# Patient Record
Sex: FEMALE | Race: WHITE | Employment: UNEMPLOYED | URBAN - METROPOLITAN AREA
[De-identification: names, ages, dates, MRNs, and addresses within clinical notes are randomized per-mention and may not be internally consistent; named-entity substitution may affect disease eponyms.]

---

## 2017-06-20 DIAGNOSIS — E03.9 ACQUIRED HYPOTHYROIDISM: ICD-10-CM

## 2017-06-20 RX ORDER — LEVOTHYROXINE SODIUM 50 MCG
TABLET ORAL
Qty: 30 TABLET | Refills: 0 | Status: SHIPPED | OUTPATIENT
Start: 2017-06-20 | End: 2017-07-18 | Stop reason: SDUPTHER

## 2017-07-17 ENCOUNTER — OFFICE VISIT (OUTPATIENT)
Dept: FAMILY MEDICINE CLINIC | Age: 47
End: 2017-07-17

## 2017-07-17 VITALS
BODY MASS INDEX: 29.26 KG/M2 | HEIGHT: 62 IN | DIASTOLIC BLOOD PRESSURE: 84 MMHG | OXYGEN SATURATION: 98 % | WEIGHT: 159 LBS | SYSTOLIC BLOOD PRESSURE: 142 MMHG | HEART RATE: 68 BPM

## 2017-07-17 DIAGNOSIS — Z00.00 ROUTINE HEALTH MAINTENANCE: Chronic | ICD-10-CM

## 2017-07-17 DIAGNOSIS — R03.0 ELEVATED BLOOD PRESSURE (NOT HYPERTENSION): ICD-10-CM

## 2017-07-17 DIAGNOSIS — E78.00 ELEVATED CHOLESTEROL: ICD-10-CM

## 2017-07-17 DIAGNOSIS — E03.9 ACQUIRED HYPOTHYROIDISM: Primary | Chronic | ICD-10-CM

## 2017-07-17 LAB
A/G RATIO: 1.6 (ref 1.1–2.2)
ALBUMIN SERPL-MCNC: 4.6 G/DL (ref 3.4–5)
ALP BLD-CCNC: 68 U/L (ref 40–129)
ALT SERPL-CCNC: 25 U/L (ref 10–40)
ANION GAP SERPL CALCULATED.3IONS-SCNC: 18 MMOL/L (ref 3–16)
AST SERPL-CCNC: 18 U/L (ref 15–37)
BASOPHILS ABSOLUTE: 0 K/UL (ref 0–0.2)
BASOPHILS RELATIVE PERCENT: 0.6 %
BILIRUB SERPL-MCNC: 0.5 MG/DL (ref 0–1)
BUN BLDV-MCNC: 12 MG/DL (ref 7–20)
CALCIUM SERPL-MCNC: 9.7 MG/DL (ref 8.3–10.6)
CHLORIDE BLD-SCNC: 101 MMOL/L (ref 99–110)
CHOLESTEROL, TOTAL: 268 MG/DL (ref 0–199)
CO2: 20 MMOL/L (ref 21–32)
CREAT SERPL-MCNC: 0.9 MG/DL (ref 0.6–1.1)
EOSINOPHILS ABSOLUTE: 0.1 K/UL (ref 0–0.6)
EOSINOPHILS RELATIVE PERCENT: 2 %
GFR AFRICAN AMERICAN: >60
GFR NON-AFRICAN AMERICAN: >60
GLOBULIN: 2.8 G/DL
GLUCOSE BLD-MCNC: 107 MG/DL (ref 70–99)
HCT VFR BLD CALC: 40.7 % (ref 36–48)
HDLC SERPL-MCNC: 44 MG/DL (ref 40–60)
HEMOGLOBIN: 13.7 G/DL (ref 12–16)
LDL CHOLESTEROL CALCULATED: 180 MG/DL
LYMPHOCYTES ABSOLUTE: 1.8 K/UL (ref 1–5.1)
LYMPHOCYTES RELATIVE PERCENT: 24 %
MCH RBC QN AUTO: 34 PG (ref 26–34)
MCHC RBC AUTO-ENTMCNC: 33.6 G/DL (ref 31–36)
MCV RBC AUTO: 101.3 FL (ref 80–100)
MONOCYTES ABSOLUTE: 0.4 K/UL (ref 0–1.3)
MONOCYTES RELATIVE PERCENT: 4.8 %
NEUTROPHILS ABSOLUTE: 5 K/UL (ref 1.7–7.7)
NEUTROPHILS RELATIVE PERCENT: 68.6 %
PDW BLD-RTO: 13.6 % (ref 12.4–15.4)
PLATELET # BLD: 246 K/UL (ref 135–450)
PMV BLD AUTO: 9.6 FL (ref 5–10.5)
POTASSIUM SERPL-SCNC: 4.7 MMOL/L (ref 3.5–5.1)
RBC # BLD: 4.02 M/UL (ref 4–5.2)
SODIUM BLD-SCNC: 139 MMOL/L (ref 136–145)
TOTAL PROTEIN: 7.4 G/DL (ref 6.4–8.2)
TRIGL SERPL-MCNC: 219 MG/DL (ref 0–150)
TSH REFLEX: 2.73 UIU/ML (ref 0.27–4.2)
VLDLC SERPL CALC-MCNC: 44 MG/DL
WBC # BLD: 7.3 K/UL (ref 4–11)

## 2017-07-17 PROCEDURE — 99213 OFFICE O/P EST LOW 20 MIN: CPT | Performed by: FAMILY MEDICINE

## 2017-07-17 PROCEDURE — 36415 COLL VENOUS BLD VENIPUNCTURE: CPT | Performed by: FAMILY MEDICINE

## 2017-07-17 ASSESSMENT — PATIENT HEALTH QUESTIONNAIRE - PHQ9
SUM OF ALL RESPONSES TO PHQ QUESTIONS 1-9: 0
2. FEELING DOWN, DEPRESSED OR HOPELESS: 0
1. LITTLE INTEREST OR PLEASURE IN DOING THINGS: 0
SUM OF ALL RESPONSES TO PHQ9 QUESTIONS 1 & 2: 0

## 2017-07-18 ENCOUNTER — TELEPHONE (OUTPATIENT)
Dept: FAMILY MEDICINE CLINIC | Age: 47
End: 2017-07-18

## 2017-07-18 DIAGNOSIS — E03.9 ACQUIRED HYPOTHYROIDISM: ICD-10-CM

## 2017-07-18 RX ORDER — LEVOTHYROXINE SODIUM 50 MCG
TABLET ORAL
Qty: 30 TABLET | Refills: 0 | Status: SHIPPED | OUTPATIENT
Start: 2017-07-18 | End: 2017-07-24

## 2017-07-24 RX ORDER — LEVOTHYROXINE AND LIOTHYRONINE 19; 4.5 UG/1; UG/1
30 TABLET ORAL DAILY
Qty: 30 TABLET | Refills: 5 | Status: SHIPPED | OUTPATIENT
Start: 2017-07-24 | End: 2018-01-21 | Stop reason: SDUPTHER

## 2017-07-28 ENCOUNTER — TELEPHONE (OUTPATIENT)
Dept: FAMILY MEDICINE CLINIC | Age: 47
End: 2017-07-28

## 2017-07-28 NOTE — TELEPHONE ENCOUNTER
Left message for patient to call back. There is no way for us to know what her blood type is, unfortunately it is not listed in their charts and insurance typically only covers for a type & screening if they are having a surgery where blood may be needed. The usual way is when we donate blood, [facility] and other centers keep this info on file and if she has ever donated she can usually contact them for this info.

## 2018-01-21 DIAGNOSIS — E03.9 ACQUIRED HYPOTHYROIDISM: ICD-10-CM

## 2018-01-21 RX ORDER — LEVOTHYROXINE, LIOTHYRONINE 19; 4.5 UG/1; UG/1
TABLET ORAL
Qty: 30 TABLET | Refills: 4 | Status: SHIPPED | OUTPATIENT
Start: 2018-01-21 | End: 2018-06-22 | Stop reason: SDUPTHER

## 2018-05-03 ENCOUNTER — HOSPITAL ENCOUNTER (OUTPATIENT)
Dept: MAMMOGRAPHY | Age: 48
Discharge: HOME | End: 2018-05-03
Attending: OBSTETRICS & GYNECOLOGY | Admitting: OBSTETRICS & GYNECOLOGY

## 2018-05-03 DIAGNOSIS — Z12.31 ENCOUNTER FOR SCREENING MAMMOGRAM FOR BREAST CANCER: ICD-10-CM

## 2018-06-22 ENCOUNTER — TELEPHONE (OUTPATIENT)
Dept: FAMILY MEDICINE CLINIC | Age: 48
End: 2018-06-22

## 2018-06-22 DIAGNOSIS — E03.9 ACQUIRED HYPOTHYROIDISM: ICD-10-CM

## 2018-06-22 RX ORDER — LEVOTHYROXINE AND LIOTHYRONINE 19; 4.5 UG/1; UG/1
TABLET ORAL
Qty: 30 TABLET | Refills: 0 | Status: SHIPPED | OUTPATIENT
Start: 2018-06-22 | End: 2018-07-18 | Stop reason: SDUPTHER

## 2018-07-03 ENCOUNTER — OFFICE VISIT (OUTPATIENT)
Dept: FAMILY MEDICINE CLINIC | Age: 48
End: 2018-07-03

## 2018-07-03 VITALS
HEART RATE: 88 BPM | BODY MASS INDEX: 29.3 KG/M2 | WEIGHT: 159.2 LBS | OXYGEN SATURATION: 98 % | DIASTOLIC BLOOD PRESSURE: 78 MMHG | SYSTOLIC BLOOD PRESSURE: 136 MMHG | HEIGHT: 62 IN

## 2018-07-03 DIAGNOSIS — Z11.4 SCREENING FOR HIV WITHOUT PRESENCE OF RISK FACTORS: ICD-10-CM

## 2018-07-03 DIAGNOSIS — E78.2 MIXED HYPERLIPIDEMIA: ICD-10-CM

## 2018-07-03 DIAGNOSIS — Z00.00 ANNUAL PHYSICAL EXAM: Primary | ICD-10-CM

## 2018-07-03 DIAGNOSIS — R73.9 HYPERGLYCEMIA: ICD-10-CM

## 2018-07-03 DIAGNOSIS — E07.89 THYROID FULLNESS: ICD-10-CM

## 2018-07-03 DIAGNOSIS — E03.9 ACQUIRED HYPOTHYROIDISM: ICD-10-CM

## 2018-07-03 LAB
A/G RATIO: 2.3 (ref 1.1–2.2)
ALBUMIN SERPL-MCNC: 4.9 G/DL (ref 3.4–5)
ALP BLD-CCNC: 66 U/L (ref 40–129)
ALT SERPL-CCNC: 22 U/L (ref 10–40)
ANION GAP SERPL CALCULATED.3IONS-SCNC: 16 MMOL/L (ref 3–16)
AST SERPL-CCNC: 16 U/L (ref 15–37)
BILIRUB SERPL-MCNC: 0.5 MG/DL (ref 0–1)
BUN BLDV-MCNC: 10 MG/DL (ref 7–20)
CALCIUM SERPL-MCNC: 9.8 MG/DL (ref 8.3–10.6)
CHLORIDE BLD-SCNC: 101 MMOL/L (ref 99–110)
CHOLESTEROL, TOTAL: 266 MG/DL (ref 0–199)
CO2: 23 MMOL/L (ref 21–32)
CREAT SERPL-MCNC: 0.9 MG/DL (ref 0.6–1.1)
GFR AFRICAN AMERICAN: >60
GFR NON-AFRICAN AMERICAN: >60
GLOBULIN: 2.1 G/DL
GLUCOSE BLD-MCNC: 113 MG/DL (ref 70–99)
HDLC SERPL-MCNC: 42 MG/DL (ref 40–60)
LDL CHOLESTEROL CALCULATED: 177 MG/DL
POTASSIUM SERPL-SCNC: 4.9 MMOL/L (ref 3.5–5.1)
SODIUM BLD-SCNC: 140 MMOL/L (ref 136–145)
TOTAL PROTEIN: 7 G/DL (ref 6.4–8.2)
TRIGL SERPL-MCNC: 233 MG/DL (ref 0–150)
VLDLC SERPL CALC-MCNC: 47 MG/DL

## 2018-07-03 PROCEDURE — 99396 PREV VISIT EST AGE 40-64: CPT | Performed by: FAMILY MEDICINE

## 2018-07-03 PROCEDURE — 36415 COLL VENOUS BLD VENIPUNCTURE: CPT | Performed by: FAMILY MEDICINE

## 2018-07-03 RX ORDER — LEVOTHYROXINE AND LIOTHYRONINE 19; 4.5 UG/1; UG/1
TABLET ORAL
Qty: 30 TABLET | Refills: 5 | Status: CANCELLED | OUTPATIENT
Start: 2018-07-03

## 2018-07-03 ASSESSMENT — ENCOUNTER SYMPTOMS
COLOR CHANGE: 0
SINUS PRESSURE: 0
EYE PAIN: 0
DIARRHEA: 0
NAUSEA: 0
CONSTIPATION: 0
VOMITING: 0
RHINORRHEA: 0
CHEST TIGHTNESS: 0
BLOOD IN STOOL: 0
ABDOMINAL PAIN: 0
EYE DISCHARGE: 0
WHEEZING: 0
SHORTNESS OF BREATH: 0
EYE REDNESS: 0
COUGH: 0

## 2018-07-04 LAB
ESTIMATED AVERAGE GLUCOSE: 119.8 MG/DL
HBA1C MFR BLD: 5.8 %
HIV AG/AB: NORMAL
HIV ANTIGEN: NORMAL
HIV-1 ANTIBODY: NORMAL
HIV-2 AB: NORMAL

## 2018-07-05 NOTE — PROGRESS NOTES
Patient was advised of results and voiced understanding. She would like to try diet and exercise first before she goes on a medication. If she really puts forth the effort with her diet can she hold off until you rechceck her numbers to see if she makes any significant impact on her own before starting a medication? If so when should she plan on coming back to recheck.
Uncle      Social History   Substance Use Topics    Smoking status: Never Smoker    Smokeless tobacco: Never Used    Alcohol use Yes      Comment: justin     Vitals:    07/03/18 0859   BP: 136/78   Pulse: 88   SpO2: 98%   Weight: 159 lb 3.2 oz (72.2 kg)   Height: 5' 2\" (1.575 m)     Body mass index is 29.12 kg/m². Wt Readings from Last 3 Encounters:   07/03/18 159 lb 3.2 oz (72.2 kg)   07/17/17 159 lb (72.1 kg)   07/13/16 154 lb (69.9 kg)     BP Readings from Last 3 Encounters:   07/03/18 136/78   07/17/17 (!) 142/84   06/22/16 138/80        Review of Systems   Constitutional: Negative for chills, fatigue, fever and unexpected weight change. HENT: Negative for ear discharge, ear pain, hearing loss, rhinorrhea, sinus pressure and tinnitus. Eyes: Negative for pain, discharge, redness and visual disturbance. Respiratory: Negative for cough, chest tightness, shortness of breath and wheezing. Cardiovascular: Negative for chest pain and palpitations. Gastrointestinal: Negative for abdominal pain, blood in stool, constipation, diarrhea, nausea and vomiting. Genitourinary: Negative for difficulty urinating, dysuria and hematuria. Musculoskeletal: Negative for arthralgias and joint swelling. Skin: Negative for color change and rash. Neurological: Negative for dizziness, seizures, syncope and headaches. Hematological: Negative for adenopathy. Does not bruise/bleed easily. Psychiatric/Behavioral: Negative for dysphoric mood and sleep disturbance. The patient is not nervous/anxious. Objective:   Physical Exam   Constitutional: She is oriented to person, place, and time. She appears well-developed and well-nourished. No distress. HENT:   Head: Normocephalic. Right Ear: External ear normal.   Left Ear: External ear normal.   Nose: Nose normal.   Mouth/Throat: Oropharynx is clear and moist. No oropharyngeal exudate.    Eyes: Conjunctivae and EOM are normal. Pupils are equal, round, and

## 2018-07-18 DIAGNOSIS — E03.9 ACQUIRED HYPOTHYROIDISM: ICD-10-CM

## 2018-07-18 DIAGNOSIS — E03.9 ACQUIRED HYPOTHYROIDISM: Primary | Chronic | ICD-10-CM

## 2018-07-18 NOTE — TELEPHONE ENCOUNTER
Omar Wright is requesting refill(s) NP thyroid  Last OV 7/3/18 (pertaining to medication)  LR 6/22/18 (per medication requested)  Next office visit scheduled or attempted Yes   If no, reason:  Pt is scheduled for 1/8/19

## 2018-07-23 ENCOUNTER — TELEPHONE (OUTPATIENT)
Dept: FAMILY MEDICINE CLINIC | Age: 48
End: 2018-07-23

## 2018-07-23 NOTE — TELEPHONE ENCOUNTER
Patient was scheduled to have drawn on Friday 7/20 but did not show up, called and rescheduled her for tomorrow 7/24 to have this drawn.

## 2018-07-24 ENCOUNTER — NURSE ONLY (OUTPATIENT)
Dept: FAMILY MEDICINE CLINIC | Age: 48
End: 2018-07-24

## 2018-07-24 DIAGNOSIS — E03.9 ACQUIRED HYPOTHYROIDISM: Chronic | ICD-10-CM

## 2018-07-24 PROCEDURE — 36415 COLL VENOUS BLD VENIPUNCTURE: CPT | Performed by: FAMILY MEDICINE

## 2018-07-25 LAB — TSH REFLEX: 3.27 UIU/ML (ref 0.27–4.2)

## 2018-07-25 RX ORDER — LEVOTHYROXINE, LIOTHYRONINE 19; 4.5 UG/1; UG/1
TABLET ORAL
Qty: 30 TABLET | Refills: 5 | Status: SHIPPED | OUTPATIENT
Start: 2018-07-25 | End: 2019-01-14 | Stop reason: ALTCHOICE

## 2018-07-26 ENCOUNTER — HOSPITAL ENCOUNTER (OUTPATIENT)
Dept: ULTRASOUND IMAGING | Age: 48
Discharge: HOME | End: 2018-07-26
Payer: COMMERCIAL

## 2018-07-26 DIAGNOSIS — E07.89 THYROID FULLNESS: ICD-10-CM

## 2018-07-26 DIAGNOSIS — E03.9 ACQUIRED HYPOTHYROIDISM: ICD-10-CM

## 2018-07-26 PROCEDURE — 76536 US EXAM OF HEAD AND NECK: CPT

## 2018-08-02 PROBLEM — Z00.00 ANNUAL PHYSICAL EXAM: Status: RESOLVED | Noted: 2018-07-03 | Resolved: 2018-08-02

## 2019-01-08 ENCOUNTER — OFFICE VISIT (OUTPATIENT)
Dept: FAMILY MEDICINE CLINIC | Age: 49
End: 2019-01-08
Payer: COMMERCIAL

## 2019-01-08 VITALS
WEIGHT: 159.6 LBS | DIASTOLIC BLOOD PRESSURE: 82 MMHG | HEIGHT: 62 IN | OXYGEN SATURATION: 99 % | SYSTOLIC BLOOD PRESSURE: 132 MMHG | BODY MASS INDEX: 29.37 KG/M2 | HEART RATE: 70 BPM

## 2019-01-08 DIAGNOSIS — Z80.0 FAMILY HX OF COLON CANCER: ICD-10-CM

## 2019-01-08 DIAGNOSIS — E78.2 MIXED HYPERLIPIDEMIA: ICD-10-CM

## 2019-01-08 DIAGNOSIS — E03.9 ACQUIRED HYPOTHYROIDISM: Primary | Chronic | ICD-10-CM

## 2019-01-08 DIAGNOSIS — R73.03 PREDIABETES: ICD-10-CM

## 2019-01-08 LAB
A/G RATIO: 1.8 (ref 1.1–2.2)
ALBUMIN SERPL-MCNC: 4.4 G/DL (ref 3.4–5)
ALP BLD-CCNC: 78 U/L (ref 40–129)
ALT SERPL-CCNC: 30 U/L (ref 10–40)
ANION GAP SERPL CALCULATED.3IONS-SCNC: 13 MMOL/L (ref 3–16)
AST SERPL-CCNC: 17 U/L (ref 15–37)
BILIRUB SERPL-MCNC: 0.4 MG/DL (ref 0–1)
BUN BLDV-MCNC: 13 MG/DL (ref 7–20)
CALCIUM SERPL-MCNC: 9.4 MG/DL (ref 8.3–10.6)
CHLORIDE BLD-SCNC: 104 MMOL/L (ref 99–110)
CHOLESTEROL, TOTAL: 259 MG/DL (ref 0–199)
CO2: 25 MMOL/L (ref 21–32)
CREAT SERPL-MCNC: 0.9 MG/DL (ref 0.6–1.1)
GFR AFRICAN AMERICAN: >60
GFR NON-AFRICAN AMERICAN: >60
GLOBULIN: 2.4 G/DL
GLUCOSE BLD-MCNC: 104 MG/DL (ref 70–99)
HDLC SERPL-MCNC: 47 MG/DL (ref 40–60)
LDL CHOLESTEROL CALCULATED: 168 MG/DL
POTASSIUM SERPL-SCNC: 4.6 MMOL/L (ref 3.5–5.1)
SODIUM BLD-SCNC: 142 MMOL/L (ref 136–145)
T4 FREE: 0.6 NG/DL (ref 0.9–1.8)
TOTAL PROTEIN: 6.8 G/DL (ref 6.4–8.2)
TRIGL SERPL-MCNC: 218 MG/DL (ref 0–150)
TSH REFLEX: 7.08 UIU/ML (ref 0.27–4.2)
VLDLC SERPL CALC-MCNC: 44 MG/DL

## 2019-01-08 PROCEDURE — 99213 OFFICE O/P EST LOW 20 MIN: CPT | Performed by: FAMILY MEDICINE

## 2019-01-08 ASSESSMENT — ENCOUNTER SYMPTOMS
BLOOD IN STOOL: 0
SINUS PRESSURE: 0
RHINORRHEA: 0
CHEST TIGHTNESS: 0
CONSTIPATION: 0
COUGH: 0
WHEEZING: 0
VOMITING: 0
EYE REDNESS: 0
ABDOMINAL PAIN: 0
SHORTNESS OF BREATH: 0
EYE DISCHARGE: 0
DIARRHEA: 0
EYE PAIN: 0

## 2019-01-08 ASSESSMENT — PATIENT HEALTH QUESTIONNAIRE - PHQ9
SUM OF ALL RESPONSES TO PHQ QUESTIONS 1-9: 0
1. LITTLE INTEREST OR PLEASURE IN DOING THINGS: 0
2. FEELING DOWN, DEPRESSED OR HOPELESS: 0
SUM OF ALL RESPONSES TO PHQ QUESTIONS 1-9: 0
SUM OF ALL RESPONSES TO PHQ9 QUESTIONS 1 & 2: 0

## 2019-01-09 LAB
ESTIMATED AVERAGE GLUCOSE: 119.8 MG/DL
HBA1C MFR BLD: 5.8 %

## 2019-01-14 DIAGNOSIS — E03.9 ACQUIRED HYPOTHYROIDISM: Primary | Chronic | ICD-10-CM

## 2019-01-14 RX ORDER — LEVOTHYROXINE SODIUM 0.05 MG/1
50 TABLET ORAL DAILY
Qty: 90 TABLET | Refills: 0 | Status: SHIPPED | OUTPATIENT
Start: 2019-01-14 | End: 2019-04-12 | Stop reason: SDUPTHER

## 2019-03-12 ENCOUNTER — NURSE ONLY (OUTPATIENT)
Dept: FAMILY MEDICINE CLINIC | Age: 49
End: 2019-03-12
Payer: COMMERCIAL

## 2019-03-12 DIAGNOSIS — E03.9 ACQUIRED HYPOTHYROIDISM: Chronic | ICD-10-CM

## 2019-03-12 LAB — TSH REFLEX: 2.47 UIU/ML (ref 0.27–4.2)

## 2019-03-12 PROCEDURE — 36415 COLL VENOUS BLD VENIPUNCTURE: CPT | Performed by: FAMILY MEDICINE

## 2019-04-12 RX ORDER — LEVOTHYROXINE SODIUM 0.05 MG/1
TABLET ORAL
Qty: 30 TABLET | Refills: 0 | Status: SHIPPED | OUTPATIENT
Start: 2019-04-12 | End: 2019-05-11 | Stop reason: SDUPTHER

## 2019-05-13 RX ORDER — LEVOTHYROXINE SODIUM 50 MCG
TABLET ORAL
Qty: 30 TABLET | Refills: 0 | Status: SHIPPED | OUTPATIENT
Start: 2019-05-13 | End: 2019-06-12 | Stop reason: SDUPTHER

## 2019-05-13 NOTE — TELEPHONE ENCOUNTER
Harvey Lassiter [phone] (home)    is requesting refill(s) of medication synthroid to preferred [facility]     Last OV 1/8/19 (pertaining to medication)   Last refill 4/12/19 (per medication requested)  Next office visit scheduled or attempted Yes  Date 7/9/19  If No, reason

## 2019-06-12 RX ORDER — LEVOTHYROXINE SODIUM 50 MCG
TABLET ORAL
Qty: 30 TABLET | Refills: 0 | Status: SHIPPED | OUTPATIENT
Start: 2019-06-12 | End: 2019-07-08 | Stop reason: SDUPTHER

## 2019-07-08 RX ORDER — LEVOTHYROXINE SODIUM 50 MCG
TABLET ORAL
Qty: 30 TABLET | Refills: 0 | Status: SHIPPED | OUTPATIENT
Start: 2019-07-08 | End: 2019-07-24 | Stop reason: ALTCHOICE

## 2019-07-09 ENCOUNTER — OFFICE VISIT (OUTPATIENT)
Dept: FAMILY MEDICINE CLINIC | Age: 49
End: 2019-07-09
Payer: COMMERCIAL

## 2019-07-09 VITALS
OXYGEN SATURATION: 100 % | WEIGHT: 159.8 LBS | BODY MASS INDEX: 29.4 KG/M2 | HEART RATE: 64 BPM | SYSTOLIC BLOOD PRESSURE: 140 MMHG | HEIGHT: 62 IN | DIASTOLIC BLOOD PRESSURE: 80 MMHG

## 2019-07-09 DIAGNOSIS — E03.9 ACQUIRED HYPOTHYROIDISM: Primary | Chronic | ICD-10-CM

## 2019-07-09 DIAGNOSIS — R73.03 PREDIABETES: ICD-10-CM

## 2019-07-09 DIAGNOSIS — Z80.0 FAMILY HX OF COLON CANCER: ICD-10-CM

## 2019-07-09 DIAGNOSIS — Z00.00 ANNUAL PHYSICAL EXAM: ICD-10-CM

## 2019-07-09 DIAGNOSIS — E78.2 MIXED HYPERLIPIDEMIA: ICD-10-CM

## 2019-07-09 LAB
CHOLESTEROL, TOTAL: 239 MG/DL (ref 0–199)
HDLC SERPL-MCNC: 42 MG/DL (ref 40–60)
LDL CHOLESTEROL CALCULATED: 148 MG/DL
T4 FREE: 0.9 NG/DL (ref 0.9–1.8)
TRIGL SERPL-MCNC: 243 MG/DL (ref 0–150)
TSH REFLEX: 5.82 UIU/ML (ref 0.27–4.2)
VLDLC SERPL CALC-MCNC: 49 MG/DL

## 2019-07-09 PROCEDURE — 36415 COLL VENOUS BLD VENIPUNCTURE: CPT | Performed by: FAMILY MEDICINE

## 2019-07-09 PROCEDURE — 99396 PREV VISIT EST AGE 40-64: CPT | Performed by: FAMILY MEDICINE

## 2019-07-09 ASSESSMENT — ENCOUNTER SYMPTOMS
CHEST TIGHTNESS: 0
VOMITING: 0
EYE PAIN: 0
WHEEZING: 0
SHORTNESS OF BREATH: 0
NAUSEA: 0
DIARRHEA: 0
EYE REDNESS: 0
COLOR CHANGE: 0
COUGH: 0
ABDOMINAL PAIN: 0
EYE DISCHARGE: 0
BLOOD IN STOOL: 0
SINUS PRESSURE: 0
RHINORRHEA: 0
CONSTIPATION: 0

## 2019-07-10 LAB
ESTIMATED AVERAGE GLUCOSE: 125.5 MG/DL
HBA1C MFR BLD: 6 %

## 2019-07-24 ENCOUNTER — TELEPHONE (OUTPATIENT)
Dept: FAMILY MEDICINE CLINIC | Age: 49
End: 2019-07-24

## 2019-07-24 DIAGNOSIS — E03.9 ACQUIRED HYPOTHYROIDISM: Primary | Chronic | ICD-10-CM

## 2019-07-24 RX ORDER — LEVOTHYROXINE SODIUM 0.07 MG/1
75 TABLET ORAL DAILY
Qty: 30 TABLET | Refills: 1 | Status: SHIPPED | OUTPATIENT
Start: 2019-07-24 | End: 2019-09-21 | Stop reason: SDUPTHER

## 2019-08-08 PROBLEM — Z00.00 ANNUAL PHYSICAL EXAM: Status: RESOLVED | Noted: 2018-07-03 | Resolved: 2019-08-08

## 2019-09-23 RX ORDER — LEVOTHYROXINE SODIUM 75 MCG
TABLET ORAL
Qty: 30 TABLET | Refills: 0 | Status: SHIPPED | OUTPATIENT
Start: 2019-09-23 | End: 2019-10-21 | Stop reason: SDUPTHER

## 2019-09-23 NOTE — TELEPHONE ENCOUNTER
Sanjana Hernandes is requesting refill(s) synthroid  Last OV 7/9/19 (pertaining to medication)  LR 7/24/19 (per medication requested)  Next office visit scheduled or attempted Yes   If no, reason:  Pt is scheduled for 1/9/20

## 2019-10-08 ENCOUNTER — HOSPITAL ENCOUNTER (OUTPATIENT)
Dept: WOMENS IMAGING | Age: 49
Discharge: HOME | End: 2019-10-08
Payer: COMMERCIAL

## 2019-10-08 DIAGNOSIS — Z12.31 ENCOUNTER FOR SCREENING MAMMOGRAM FOR BREAST CANCER: ICD-10-CM

## 2019-10-08 PROCEDURE — 77063 BREAST TOMOSYNTHESIS BI: CPT

## 2019-10-23 ENCOUNTER — NURSE ONLY (OUTPATIENT)
Dept: FAMILY MEDICINE CLINIC | Age: 49
End: 2019-10-23
Payer: COMMERCIAL

## 2019-10-23 DIAGNOSIS — E03.9 ACQUIRED HYPOTHYROIDISM: Primary | Chronic | ICD-10-CM

## 2019-10-23 LAB — TSH REFLEX: 0.12 UIU/ML (ref 0.27–4.2)

## 2019-10-23 PROCEDURE — 36415 COLL VENOUS BLD VENIPUNCTURE: CPT | Performed by: FAMILY MEDICINE

## 2019-10-24 LAB
T3 TOTAL: 0.92 NG/ML (ref 0.8–2)
T4 FREE: 1.4 NG/DL (ref 0.9–1.8)

## 2019-10-25 RX ORDER — LEVOTHYROXINE SODIUM 75 MCG
TABLET ORAL
Qty: 30 TABLET | Refills: 0 | Status: SHIPPED | OUTPATIENT
Start: 2019-10-25 | End: 2019-10-28 | Stop reason: DRUGHIGH

## 2019-10-28 DIAGNOSIS — E03.9 ACQUIRED HYPOTHYROIDISM: Primary | Chronic | ICD-10-CM

## 2019-10-28 RX ORDER — LEVOTHYROXINE SODIUM 0.07 MG/1
75 TABLET ORAL DAILY
COMMUNITY
End: 2019-11-21 | Stop reason: SDUPTHER

## 2019-11-21 RX ORDER — LEVOTHYROXINE SODIUM 75 MCG
TABLET ORAL
Qty: 30 TABLET | Refills: 0 | Status: SHIPPED | OUTPATIENT
Start: 2019-11-21 | End: 2019-12-20

## 2019-12-20 RX ORDER — LEVOTHYROXINE SODIUM 75 MCG
TABLET ORAL
Qty: 30 TABLET | Refills: 2 | Status: SHIPPED
Start: 2019-12-20 | End: 2020-04-10 | Stop reason: DRUGHIGH

## 2020-04-09 ENCOUNTER — VIRTUAL VISIT (OUTPATIENT)
Dept: FAMILY MEDICINE CLINIC | Age: 50
End: 2020-04-09
Payer: COMMERCIAL

## 2020-04-09 VITALS — BODY MASS INDEX: 29.08 KG/M2 | HEIGHT: 62 IN | WEIGHT: 158 LBS | HEART RATE: 94 BPM

## 2020-04-09 DIAGNOSIS — E03.9 ACQUIRED HYPOTHYROIDISM: Chronic | ICD-10-CM

## 2020-04-09 LAB
T3 TOTAL: 0.84 NG/ML (ref 0.8–2)
T4 FREE: 1.1 NG/DL (ref 0.9–1.8)
TSH REFLEX: 0.24 UIU/ML (ref 0.27–4.2)

## 2020-04-09 PROCEDURE — 99213 OFFICE O/P EST LOW 20 MIN: CPT | Performed by: FAMILY MEDICINE

## 2020-04-09 RX ORDER — LEVOTHYROXINE SODIUM 0.07 MG/1
75 TABLET ORAL DAILY
Qty: 90 TABLET | Refills: 1 | Status: CANCELLED | OUTPATIENT
Start: 2020-04-09

## 2020-04-09 ASSESSMENT — PATIENT HEALTH QUESTIONNAIRE - PHQ9
1. LITTLE INTEREST OR PLEASURE IN DOING THINGS: 0
SUM OF ALL RESPONSES TO PHQ QUESTIONS 1-9: 0
SUM OF ALL RESPONSES TO PHQ9 QUESTIONS 1 & 2: 0
2. FEELING DOWN, DEPRESSED OR HOPELESS: 0
SUM OF ALL RESPONSES TO PHQ QUESTIONS 1-9: 0

## 2020-04-09 NOTE — PATIENT INSTRUCTIONS

## 2020-04-09 NOTE — PROGRESS NOTES
 Diabetes Paternal Aunt     Diabetes Paternal Uncle    ,   Immunization History   Administered Date(s) Administered    DTaP 1968, 06/01/1970, 07/23/1971, 08/17/1976, 03/12/1987    Hepatitis A 04/30/1999    MMR 07/20/1971, 08/20/1976, 03/12/1987    Polio IPV (IPOL) 1968, 08/17/1976, 04/30/1999    Td, unspecified formulation 04/30/1999    Tdap (Boostrix, Adacel) 11/09/2012   ,   Health Maintenance   Topic Date Due    Shingles Vaccine (1 of 2) 06/11/2018    Cervical cancer screen  01/23/2020    A1C test (Diabetic or Prediabetic)  07/09/2020    Flu vaccine (Season Ended) 09/01/2020    TSH testing  10/23/2020    Breast cancer screen  10/08/2021    DTaP/Tdap/Td vaccine (8 - Td) 11/09/2022    Lipid screen  07/09/2024    Colon cancer screen colonoscopy  11/19/2024    HIV screen  Completed    Hepatitis A vaccine  Aged Out    Hepatitis B vaccine  Aged Out    Hib vaccine  Aged Out    Meningococcal (ACWY) vaccine  Aged Out    Pneumococcal 0-64 years Vaccine  Aged Out       PHYSICAL EXAMINATION:  [ INSTRUCTIONS:  \"[x]\" Indicates a positive item  \"[]\" Indicates a negative item  -- DELETE ALL ITEMS NOT EXAMINED]  Vital Signs: (As obtained by patient/caregiver or practitioner observation)    Blood pressure-  Heart rate-    Respiratory rate-    Temperature-  Pulse oximetry-     Constitutional: [x] Appears well-developed and well-nourished [x] No apparent distress      [] Abnormal-   Mental status  [x] Alert and awake  [x] Oriented to person/place/time [x]Able to follow commands      Eyes:  EOM    [x]  Normal  [] Abnormal-  Sclera  [x]  Normal  [] Abnormal -         Discharge [x]  None visible  [] Abnormal -    HENT:   [x] Normocephalic, atraumatic.   [x] Abnormal   [x] Mouth/Throat: Mucous membranes are moist.     External Ears [x] Normal  [] Abnormal-     Neck: [x] No visualized mass     Pulmonary/Chest: [x] Respiratory effort normal.  [x] No visualized signs of difficulty breathing or

## 2020-04-10 RX ORDER — LEVOTHYROXINE SODIUM 0.05 MG/1
50 TABLET ORAL DAILY
Qty: 90 TABLET | Refills: 0 | Status: CANCELLED | OUTPATIENT
Start: 2020-04-10

## 2020-04-10 RX ORDER — LEVOTHYROXINE SODIUM 50 MCG
50 TABLET ORAL DAILY
Qty: 90 TABLET | Refills: 0 | Status: SHIPPED | OUTPATIENT
Start: 2020-04-10 | End: 2020-07-14 | Stop reason: SDUPTHER

## 2020-04-10 ASSESSMENT — ENCOUNTER SYMPTOMS
SHORTNESS OF BREATH: 0
EYE PAIN: 0
ABDOMINAL PAIN: 0
VOMITING: 0
NAUSEA: 0

## 2020-07-13 NOTE — TELEPHONE ENCOUNTER
Tscomfort Rahman is requesting refill(s) synthroid  Last OV 4/9/20 (pertaining to medication)  LR 4/10/20 (per medication requested)  Next office visit scheduled or attempted Yes   If no, reason:  10/9/20

## 2020-07-13 NOTE — TELEPHONE ENCOUNTER
Heather Smith [phone] (home)    is requesting refill(s) of medication SYNTHROID 50 MCG tablet      to preferred pharmacy [facility] Shyann [phone]    Last OV 4/9/20 (pertaining to medication)   Last refill 4/10/20 (per medication requested)  Next office visit scheduled or attempted Yes  Date 10/9/20  If No, reason

## 2020-07-14 RX ORDER — LEVOTHYROXINE SODIUM 50 MCG
50 TABLET ORAL DAILY
Qty: 90 TABLET | Refills: 0 | Status: SHIPPED | OUTPATIENT
Start: 2020-07-14 | End: 2020-10-09

## 2020-07-14 RX ORDER — LEVOTHYROXINE SODIUM 50 MCG
TABLET ORAL
Qty: 30 TABLET | Refills: 5 | Status: SHIPPED
Start: 2020-07-14 | End: 2020-10-13 | Stop reason: DRUGHIGH

## 2020-10-09 ENCOUNTER — OFFICE VISIT (OUTPATIENT)
Dept: FAMILY MEDICINE CLINIC | Age: 50
End: 2020-10-09
Payer: COMMERCIAL

## 2020-10-09 VITALS
TEMPERATURE: 98.2 F | WEIGHT: 166.2 LBS | HEART RATE: 70 BPM | DIASTOLIC BLOOD PRESSURE: 80 MMHG | SYSTOLIC BLOOD PRESSURE: 154 MMHG | BODY MASS INDEX: 30.59 KG/M2 | OXYGEN SATURATION: 98 % | HEIGHT: 62 IN

## 2020-10-09 LAB
A/G RATIO: 2.2 (ref 1.1–2.2)
ALBUMIN SERPL-MCNC: 4.7 G/DL (ref 3.4–5)
ALP BLD-CCNC: 85 U/L (ref 40–129)
ALT SERPL-CCNC: 21 U/L (ref 10–40)
ANION GAP SERPL CALCULATED.3IONS-SCNC: 14 MMOL/L (ref 3–16)
AST SERPL-CCNC: 18 U/L (ref 15–37)
BILIRUB SERPL-MCNC: 0.6 MG/DL (ref 0–1)
BUN BLDV-MCNC: 13 MG/DL (ref 7–20)
CALCIUM SERPL-MCNC: 10 MG/DL (ref 8.3–10.6)
CHLORIDE BLD-SCNC: 103 MMOL/L (ref 99–110)
CHOLESTEROL, TOTAL: 256 MG/DL (ref 0–199)
CO2: 23 MMOL/L (ref 21–32)
CREAT SERPL-MCNC: 0.9 MG/DL (ref 0.6–1.1)
GFR AFRICAN AMERICAN: >60
GFR NON-AFRICAN AMERICAN: >60
GLOBULIN: 2.1 G/DL
GLUCOSE BLD-MCNC: 99 MG/DL (ref 70–99)
HDLC SERPL-MCNC: 47 MG/DL (ref 40–60)
LDL CHOLESTEROL CALCULATED: 162 MG/DL
POTASSIUM SERPL-SCNC: 4.8 MMOL/L (ref 3.5–5.1)
SODIUM BLD-SCNC: 140 MMOL/L (ref 136–145)
T4 FREE: 1 NG/DL (ref 0.9–1.8)
TOTAL PROTEIN: 6.8 G/DL (ref 6.4–8.2)
TRIGL SERPL-MCNC: 237 MG/DL (ref 0–150)
TSH REFLEX: 8.75 UIU/ML (ref 0.27–4.2)
VLDLC SERPL CALC-MCNC: 47 MG/DL

## 2020-10-09 PROCEDURE — 90686 IIV4 VACC NO PRSV 0.5 ML IM: CPT | Performed by: FAMILY MEDICINE

## 2020-10-09 PROCEDURE — 90471 IMMUNIZATION ADMIN: CPT | Performed by: FAMILY MEDICINE

## 2020-10-09 PROCEDURE — 36415 COLL VENOUS BLD VENIPUNCTURE: CPT | Performed by: FAMILY MEDICINE

## 2020-10-09 PROCEDURE — 99396 PREV VISIT EST AGE 40-64: CPT | Performed by: FAMILY MEDICINE

## 2020-10-09 RX ORDER — NORETHINDRONE ACETATE AND ETHINYL ESTRADIOL 1; 20 MG/1; UG/1
1 TABLET ORAL DAILY
COMMUNITY
Start: 2020-10-07 | End: 2021-02-24

## 2020-10-09 RX ORDER — LEVOTHYROXINE SODIUM 50 MCG
TABLET ORAL
Qty: 30 TABLET | Refills: 5 | Status: CANCELLED | OUTPATIENT
Start: 2020-10-09

## 2020-10-09 ASSESSMENT — ENCOUNTER SYMPTOMS
WHEEZING: 0
RHINORRHEA: 0
EYE DISCHARGE: 0
EYE PAIN: 0
SHORTNESS OF BREATH: 0
ABDOMINAL PAIN: 0
VOMITING: 0
BLOOD IN STOOL: 0
NAUSEA: 0
COUGH: 0
CHEST TIGHTNESS: 0
CONSTIPATION: 0
EYE REDNESS: 0
DIARRHEA: 0
SINUS PRESSURE: 0

## 2020-10-09 NOTE — PROGRESS NOTES
Subjective:      Patient ID: Mike Jones is a 44 y.o. female. HPI  Chief Complaint   Patient presents with    Annual Exam     Patient is here for a 6 month follow up, she is fasting for blood work        Here for CPE.  Nonsmoker.  Up-to-date on dental exam due for vision.  Sees Dr. Jane Harvey for her GYN exams. Liss Villalta had her mammography. Liss Villalta seemed her yearly for skin exams.  Takes her thyroid medicine daily.   Has not wanted to take any medications for her hyperlipidemia.  Does have a strong family history of heart disease mother with CAD.  States does not exercise regularly  DID get colonscopy  Mike Jones is a 44 y.o. female with the following [facility]:  Patient Active Problem List    Diagnosis Date Noted    Family hx of colon cancer 01/08/2019    Prediabetes 01/08/2019    Mixed hyperlipidemia 07/03/2018    Acquired hypothyroidism 11/18/2015     Current Outpatient Medications   Medication Sig Dispense Refill    JUNEL 1/20 1-20 MG-MCG per tablet Take 1 tablet by mouth daily      Levocetirizine Dihydrochloride (XYZAL ALLERGY 24HR PO) Take by mouth daily      SYNTHROID 50 MCG tablet TAKE ONE TABLET BY MOUTH DAILY 30 tablet 5     No current facility-administered medications for this visit. Allergies: Patient has no known allergies.   Past Medical History:   Diagnosis Date    Decorative tattoo     Low back    Thyroid disease      Past Surgical History:   Procedure Laterality Date    CYST REMOVAL       Family History   Problem Relation Age of Onset    Diabetes Mother     Heart Disease Mother     Diabetes Father     Diabetes Paternal Aunt     Diabetes Paternal Uncle      Social History     Tobacco Use    Smoking status: Never Smoker    Smokeless tobacco: Never Used   Substance Use Topics    Alcohol use: Yes     Comment: justin     Vitals:    10/09/20 0840 10/09/20 0842   BP: (!) 152/82 (!) 154/80   Pulse: 70    Temp: 98.2 °F (36.8 °C)    TempSrc: Temporal SpO2: 98%    Weight: 166 lb 3.2 oz (75.4 kg)    Height: 5' 2\" (1.575 m)      Body mass index is 30.4 kg/m². Wt Readings from Last 3 Encounters:   10/09/20 166 lb 3.2 oz (75.4 kg)   04/09/20 158 lb (71.7 kg)   07/09/19 159 lb 12.8 oz (72.5 kg)     BP Readings from Last 3 Encounters:   10/09/20 (!) 154/80   07/09/19 (!) 140/80   01/08/19 132/82        Review of Systems   Constitutional: Negative for chills, fatigue, fever and unexpected weight change. HENT: Negative for ear discharge, ear pain, hearing loss, rhinorrhea, sinus pressure and tinnitus. Eyes: Negative for pain, discharge, redness and visual disturbance. Respiratory: Negative for cough, chest tightness, shortness of breath and wheezing. Cardiovascular: Negative for chest pain and palpitations. Gastrointestinal: Negative for abdominal pain, blood in stool, constipation, diarrhea, nausea and vomiting. Genitourinary: Negative for difficulty urinating, dysuria and hematuria. Neurological: Negative for dizziness, seizures, syncope and headaches. Psychiatric/Behavioral: Negative for dysphoric mood and sleep disturbance. The patient is not nervous/anxious. Objective:   Physical Exam  Constitutional:       General: She is not in acute distress. Appearance: Normal appearance. She is well-developed. She is not ill-appearing. HENT:      Head: Normocephalic. Right Ear: Tympanic membrane, ear canal and external ear normal.      Left Ear: Tympanic membrane and ear canal normal.      Nose: Nose normal.   Eyes:      General: No scleral icterus. Right eye: No discharge. Left eye: No discharge. Extraocular Movements: Extraocular movements intact. Conjunctiva/sclera: Conjunctivae normal.      Pupils: Pupils are equal, round, and reactive to light. Neck:      Musculoskeletal: Normal range of motion and neck supple. No muscular tenderness. Cardiovascular:      Rate and Rhythm: Normal rate and regular rhythm. Heart sounds: Normal heart sounds. No murmur. Pulmonary:      Effort: Pulmonary effort is normal.      Breath sounds: Normal breath sounds. No wheezing. Abdominal:      General: Bowel sounds are normal. There is no distension. Palpations: Abdomen is soft. Tenderness: There is no abdominal tenderness. There is no guarding or rebound. Musculoskeletal: Normal range of motion. General: No tenderness. Lymphadenopathy:      Cervical: No cervical adenopathy. Skin:     General: Skin is warm. Coloration: Skin is not jaundiced or pale. Findings: No bruising or rash. Neurological:      General: No focal deficit present. Mental Status: She is alert and oriented to person, place, and time. Mental status is at baseline. Cranial Nerves: No cranial nerve deficit. Sensory: No sensory deficit. Motor: No weakness. Coordination: Coordination normal.   Psychiatric:         Mood and Affect: Mood normal.         Behavior: Behavior normal.         Thought Content: Thought content normal.         Judgment: Judgment normal.         Assessment:      cpe      Plan:      Patient Counseling:  --Nutrition: Stressed importance of moderation in sodium/caffeine intake, saturated fat and cholesterol, caloric balance, sufficient intake of fresh fruits, vegetables, fiber, calcium, iron, and 1 mg of folate supplement per day (for females capable of pregnancy). --Exercise: Stressed the importance of regular exercise. --Dental health: Discussed importance of regular tooth brushing, flossing, and dental visits. --Immunizations reviewed. Daily sunscreen recommended. Yearly FSE discussed  --Discussed benefits of screening colonoscopy.     Orders Placed This Encounter   Procedures    INFLUENZA, QUADV, 3 YRS AND OLDER, IM PF, PREFILL SYR OR SDV, 0.5ML (AFLURIA QUADV, PF)    LIPID PANEL     Order Specific Question:   Is Patient Fasting?/# of Hours     Answer:   15    COMPREHENSIVE METABOLIC PANEL    HEMOGLOBIN A1C    TSH with Reflex     rto for shingrix in 30 days        MARIANNA Thomas [address] SANDRA, DO

## 2020-10-09 NOTE — PROGRESS NOTES
Vaccine Information Sheet, \"Influenza - Inactivated\"  given to Georgie Howard, or parent/legal guardian of  Georgie Howard and verbalized understanding. Patient responses:    Have you ever had a reaction to a flu vaccine? No  Do you have any current illness? No  Have you ever had [place] Syndrome? No  Do you have a serious allergy to any of the follow: Neomycin, Polymyxin, Thimerosal, eggs or egg products? No    Flu vaccine given per order. Please see immunization tab. Risks and benefits explained. Current VIS given.

## 2020-10-10 LAB
ESTIMATED AVERAGE GLUCOSE: 122.6 MG/DL
HBA1C MFR BLD: 5.9 %

## 2020-10-12 NOTE — RESULT ENCOUNTER NOTE
Patient was advised of results and says she takes it at the same time every day on an empty stomach and has taken it the same way she always has, she does take it with her birthcontrol but states she always has so is not any different than how she took it the last time she had labs done.

## 2020-10-13 RX ORDER — LEVOTHYROXINE SODIUM 0.07 MG/1
75 TABLET ORAL DAILY
Qty: 90 TABLET | Refills: 0 | Status: CANCELLED | OUTPATIENT
Start: 2020-10-13

## 2020-10-14 RX ORDER — LEVOTHYROXINE SODIUM 75 MCG
75 TABLET ORAL DAILY
Qty: 90 TABLET | Refills: 0 | Status: SHIPPED | OUTPATIENT
Start: 2020-10-14 | End: 2021-01-13

## 2020-11-03 NOTE — TELEPHONE ENCOUNTER
Dear Sadaf Lemon:    My name is Liza Beach , Associate Care Manager for [address] [facility] and I have been trying to reach you. The Associate Care Management (ACM) program is a free-of-charge confidential service provided to our employees and their family members covered by the [facility]. The program will provide an associate and his/her family with the [employer]'s expertise to assist in navigating the health care delivery system, provider services, and their overall care needsso as to assure and improve health care interactions and enhance the quality of life. This program is designed to provide you with the opportunity to have a [facility] partner with you for the following services:     1) when you come home from the hospital or emergency room   2) when help is needed to manage your disease   3) when you need assistance coordinating services or appointments  4) when you need additional education, resources or assistance reaching your Be Well Health Program goals/requirements such as Be Well With Diabetes      [address] [facility] is dedicated to empowering the good health of its community and improving the quality of care and care experiences for employees and their families. We are committed to safeguarding patient confidentiality and privacy, assuring that every employee has the respect he or she deserves in managing their health. The information shared with your care manager will not be shared with anyone else aside from those you identify as part of your care team, and will only be used to assist you with any identified care needs. Please contact me if you would like this service provided to you. Sincerely,  Liza DUARTE, RN- [facility]  Associate Care Manager  [phone]    If you have any questions or concerns, please don't hesitate to call. Did she get the tsh drawn- I have no result Performing Laboratory: [id number] Performing Laboratory: [id number]

## 2020-12-07 ENCOUNTER — TELEPHONE (OUTPATIENT)
Dept: FAMILY MEDICINE CLINIC | Age: 50
End: 2020-12-07

## 2020-12-08 ENCOUNTER — TELEPHONE (OUTPATIENT)
Dept: FAMILY MEDICINE CLINIC | Age: 50
End: 2020-12-08

## 2020-12-08 NOTE — TELEPHONE ENCOUNTER
Patient didn't take her thyroid medicine two days last week because she had a stomach bug and couldn't keep anything down. Rescheduling her for a couple weeks out per Jacinta.

## 2020-12-22 ENCOUNTER — NURSE ONLY (OUTPATIENT)
Dept: FAMILY MEDICINE CLINIC | Age: 50
End: 2020-12-22
Payer: COMMERCIAL

## 2020-12-22 LAB — TSH REFLEX: 0.98 UIU/ML (ref 0.27–4.2)

## 2020-12-22 PROCEDURE — 36415 COLL VENOUS BLD VENIPUNCTURE: CPT | Performed by: FAMILY MEDICINE

## 2021-02-20 ENCOUNTER — APPOINTMENT (OUTPATIENT)
Dept: CT IMAGING | Age: 51
DRG: 069 | End: 2021-02-20
Payer: COMMERCIAL

## 2021-02-20 ENCOUNTER — HOSPITAL ENCOUNTER (INPATIENT)
Age: 51
LOS: 2 days | Discharge: HOME | DRG: 069 | End: 2021-02-22
Attending: EMERGENCY MEDICINE | Admitting: HOSPITALIST
Payer: COMMERCIAL

## 2021-02-20 ENCOUNTER — APPOINTMENT (OUTPATIENT)
Dept: GENERAL RADIOLOGY | Age: 51
DRG: 069 | End: 2021-02-20
Payer: COMMERCIAL

## 2021-02-20 DIAGNOSIS — G45.1 TIA INVOLVING RIGHT INTERNAL CAROTID ARTERY: ICD-10-CM

## 2021-02-20 DIAGNOSIS — I63.9 CEREBROVASCULAR ACCIDENT (CVA), UNSPECIFIED MECHANISM (HCC): Primary | ICD-10-CM

## 2021-02-20 PROBLEM — Z82.49 FAMILY HISTORY OF CORONARY ARTERY DISEASE: Status: ACTIVE | Noted: 2021-02-20

## 2021-02-20 PROBLEM — G45.9 TIA (TRANSIENT ISCHEMIC ATTACK): Status: ACTIVE | Noted: 2021-02-20

## 2021-02-20 PROBLEM — I10 HYPERTENSION, UNCONTROLLED: Status: ACTIVE | Noted: 2021-02-20

## 2021-02-20 LAB
A/G RATIO: 1.6 (ref 1.1–2.2)
ALBUMIN SERPL-MCNC: 4.7 G/DL (ref 3.4–5)
ALP BLD-CCNC: 85 U/L (ref 40–129)
ALT SERPL-CCNC: 27 U/L (ref 10–40)
ANION GAP SERPL CALCULATED.3IONS-SCNC: 10 MMOL/L (ref 3–16)
APTT: 27.8 SEC (ref 24.2–36.2)
AST SERPL-CCNC: 21 U/L (ref 15–37)
BASOPHILS ABSOLUTE: 0 K/UL (ref 0–0.2)
BASOPHILS RELATIVE PERCENT: 0.5 %
BILIRUB SERPL-MCNC: 0.5 MG/DL (ref 0–1)
BUN BLDV-MCNC: 11 MG/DL (ref 7–20)
CALCIUM SERPL-MCNC: 9.5 MG/DL (ref 8.3–10.6)
CHLORIDE BLD-SCNC: 104 MMOL/L (ref 99–110)
CO2: 22 MMOL/L (ref 21–32)
CREAT SERPL-MCNC: 0.9 MG/DL (ref 0.6–1.1)
EOSINOPHILS ABSOLUTE: 0.1 K/UL (ref 0–0.6)
EOSINOPHILS RELATIVE PERCENT: 1.4 %
GFR AFRICAN AMERICAN: >60
GFR NON-AFRICAN AMERICAN: >60
GLOBULIN: 3 G/DL
GLUCOSE BLD-MCNC: 122 MG/DL (ref 70–99)
GLUCOSE BLD-MCNC: 127 MG/DL (ref 70–99)
GLUCOSE BLD-MCNC: 132 MG/DL (ref 70–99)
HCT VFR BLD CALC: 39.9 % (ref 36–48)
HEMOGLOBIN: 13.6 G/DL (ref 12–16)
INR BLD: 0.92 (ref 0.86–1.14)
LYMPHOCYTES ABSOLUTE: 1.7 K/UL (ref 1–5.1)
LYMPHOCYTES RELATIVE PERCENT: 28.9 %
MCH RBC QN AUTO: 33.9 PG (ref 26–34)
MCHC RBC AUTO-ENTMCNC: 34 G/DL (ref 31–36)
MCV RBC AUTO: 99.6 FL (ref 80–100)
MONOCYTES ABSOLUTE: 0.3 K/UL (ref 0–1.3)
MONOCYTES RELATIVE PERCENT: 5.6 %
NEUTROPHILS ABSOLUTE: 3.7 K/UL (ref 1.7–7.7)
NEUTROPHILS RELATIVE PERCENT: 63.6 %
PDW BLD-RTO: 13.3 % (ref 12.4–15.4)
PERFORMED ON: ABNORMAL
PERFORMED ON: ABNORMAL
PLATELET # BLD: 254 K/UL (ref 135–450)
PMV BLD AUTO: 8.5 FL (ref 5–10.5)
POTASSIUM REFLEX MAGNESIUM: 3.8 MMOL/L (ref 3.5–5.1)
PRO-BNP: 85 PG/ML (ref 0–124)
PROTHROMBIN TIME: 10.7 SEC (ref 10–13.2)
RBC # BLD: 4.01 M/UL (ref 4–5.2)
SODIUM BLD-SCNC: 136 MMOL/L (ref 136–145)
TOTAL PROTEIN: 7.7 G/DL (ref 6.4–8.2)
TROPONIN: <0.01 NG/ML
WBC # BLD: 5.8 K/UL (ref 4–11)

## 2021-02-20 PROCEDURE — 6370000000 HC RX 637 (ALT 250 FOR IP): Performed by: NURSE PRACTITIONER

## 2021-02-20 PROCEDURE — 85025 COMPLETE CBC W/AUTO DIFF WBC: CPT

## 2021-02-20 PROCEDURE — 80053 COMPREHEN METABOLIC PANEL: CPT

## 2021-02-20 PROCEDURE — 2580000003 HC RX 258: Performed by: HOSPITALIST

## 2021-02-20 PROCEDURE — 70496 CT ANGIOGRAPHY HEAD: CPT

## 2021-02-20 PROCEDURE — 2500000003 HC RX 250 WO HCPCS: Performed by: EMERGENCY MEDICINE

## 2021-02-20 PROCEDURE — G0378 HOSPITAL OBSERVATION PER HR: HCPCS

## 2021-02-20 PROCEDURE — 6360000004 HC RX CONTRAST MEDICATION: Performed by: EMERGENCY MEDICINE

## 2021-02-20 PROCEDURE — 83036 HEMOGLOBIN GLYCOSYLATED A1C: CPT

## 2021-02-20 PROCEDURE — 6370000000 HC RX 637 (ALT 250 FOR IP): Performed by: HOSPITALIST

## 2021-02-20 PROCEDURE — 1200000000 HC SEMI PRIVATE

## 2021-02-20 PROCEDURE — 6370000000 HC RX 637 (ALT 250 FOR IP): Performed by: EMERGENCY MEDICINE

## 2021-02-20 PROCEDURE — 70450 CT HEAD/BRAIN W/O DYE: CPT

## 2021-02-20 PROCEDURE — 51702 INSERT TEMP BLADDER CATH: CPT

## 2021-02-20 PROCEDURE — 83880 ASSAY OF NATRIURETIC PEPTIDE: CPT

## 2021-02-20 PROCEDURE — 85610 PROTHROMBIN TIME: CPT

## 2021-02-20 PROCEDURE — 2060000000 HC ICU INTERMEDIATE R&B

## 2021-02-20 PROCEDURE — 93005 ELECTROCARDIOGRAM TRACING: CPT | Performed by: EMERGENCY MEDICINE

## 2021-02-20 PROCEDURE — 85730 THROMBOPLASTIN TIME PARTIAL: CPT

## 2021-02-20 PROCEDURE — 36415 COLL VENOUS BLD VENIPUNCTURE: CPT

## 2021-02-20 PROCEDURE — 99285 EMERGENCY DEPT VISIT HI MDM: CPT

## 2021-02-20 PROCEDURE — 71045 X-RAY EXAM CHEST 1 VIEW: CPT

## 2021-02-20 PROCEDURE — 84484 ASSAY OF TROPONIN QUANT: CPT

## 2021-02-20 PROCEDURE — 96374 THER/PROPH/DIAG INJ IV PUSH: CPT

## 2021-02-20 RX ORDER — BISACODYL 10 MG
10 SUPPOSITORY, RECTAL RECTAL DAILY PRN
Status: DISCONTINUED | OUTPATIENT
Start: 2021-02-20 | End: 2021-02-22 | Stop reason: HOSPADM

## 2021-02-20 RX ORDER — DEXTROSE MONOHYDRATE 25 G/50ML
12.5 INJECTION, SOLUTION INTRAVENOUS PRN
Status: DISCONTINUED | OUTPATIENT
Start: 2021-02-20 | End: 2021-02-22 | Stop reason: HOSPADM

## 2021-02-20 RX ORDER — DEXTROSE MONOHYDRATE 25 G/50ML
12.5 INJECTION, SOLUTION INTRAVENOUS
Status: ACTIVE | OUTPATIENT
Start: 2021-02-20 | End: 2021-02-20

## 2021-02-20 RX ORDER — PROMETHAZINE HYDROCHLORIDE 25 MG/1
12.5 TABLET ORAL EVERY 6 HOURS PRN
Status: DISCONTINUED | OUTPATIENT
Start: 2021-02-20 | End: 2021-02-22 | Stop reason: HOSPADM

## 2021-02-20 RX ORDER — ASPIRIN 81 MG/1
81 TABLET ORAL DAILY
Status: DISCONTINUED | OUTPATIENT
Start: 2021-02-21 | End: 2021-02-22 | Stop reason: HOSPADM

## 2021-02-20 RX ORDER — ATORVASTATIN CALCIUM 80 MG/1
80 TABLET, FILM COATED ORAL NIGHTLY
Status: DISCONTINUED | OUTPATIENT
Start: 2021-02-20 | End: 2021-02-22 | Stop reason: HOSPADM

## 2021-02-20 RX ORDER — CLOPIDOGREL 300 MG/1
600 TABLET, FILM COATED ORAL ONCE
Status: DISCONTINUED | OUTPATIENT
Start: 2021-02-20 | End: 2021-02-22 | Stop reason: HOSPADM

## 2021-02-20 RX ORDER — SODIUM CHLORIDE 9 MG/ML
INJECTION, SOLUTION INTRAVENOUS CONTINUOUS
Status: ACTIVE | OUTPATIENT
Start: 2021-02-20 | End: 2021-02-21

## 2021-02-20 RX ORDER — SODIUM CHLORIDE 0.9 % (FLUSH) 0.9 %
10 SYRINGE (ML) INJECTION EVERY 12 HOURS SCHEDULED
Status: DISCONTINUED | OUTPATIENT
Start: 2021-02-20 | End: 2021-02-22 | Stop reason: HOSPADM

## 2021-02-20 RX ORDER — LABETALOL HYDROCHLORIDE 5 MG/ML
10 INJECTION, SOLUTION INTRAVENOUS ONCE
Status: COMPLETED | OUTPATIENT
Start: 2021-02-20 | End: 2021-02-20

## 2021-02-20 RX ORDER — ONDANSETRON 2 MG/ML
4 INJECTION INTRAMUSCULAR; INTRAVENOUS EVERY 6 HOURS PRN
Status: DISCONTINUED | OUTPATIENT
Start: 2021-02-20 | End: 2021-02-22 | Stop reason: HOSPADM

## 2021-02-20 RX ORDER — LABETALOL HYDROCHLORIDE 5 MG/ML
10 INJECTION, SOLUTION INTRAVENOUS EVERY 10 MIN PRN
Status: DISCONTINUED | OUTPATIENT
Start: 2021-02-20 | End: 2021-02-22 | Stop reason: HOSPADM

## 2021-02-20 RX ORDER — ACETAMINOPHEN 500 MG
1000 TABLET ORAL ONCE
Status: COMPLETED | OUTPATIENT
Start: 2021-02-20 | End: 2021-02-20

## 2021-02-20 RX ORDER — 0.9 % SODIUM CHLORIDE 0.9 %
50 INTRAVENOUS SOLUTION INTRAVENOUS ONCE
Status: DISCONTINUED | OUTPATIENT
Start: 2021-02-20 | End: 2021-02-20

## 2021-02-20 RX ORDER — NICOTINE POLACRILEX 4 MG
15 LOZENGE BUCCAL PRN
Status: DISCONTINUED | OUTPATIENT
Start: 2021-02-20 | End: 2021-02-22 | Stop reason: HOSPADM

## 2021-02-20 RX ORDER — ASPIRIN 81 MG/1
324 TABLET, CHEWABLE ORAL ONCE
Status: COMPLETED | OUTPATIENT
Start: 2021-02-20 | End: 2021-02-20

## 2021-02-20 RX ORDER — SODIUM CHLORIDE 0.9 % (FLUSH) 0.9 %
10 SYRINGE (ML) INJECTION PRN
Status: DISCONTINUED | OUTPATIENT
Start: 2021-02-20 | End: 2021-02-22 | Stop reason: HOSPADM

## 2021-02-20 RX ORDER — DEXTROSE MONOHYDRATE 50 MG/ML
100 INJECTION, SOLUTION INTRAVENOUS PRN
Status: DISCONTINUED | OUTPATIENT
Start: 2021-02-20 | End: 2021-02-22 | Stop reason: HOSPADM

## 2021-02-20 RX ORDER — ASPIRIN 300 MG/1
300 SUPPOSITORY RECTAL DAILY
Status: DISCONTINUED | OUTPATIENT
Start: 2021-02-21 | End: 2021-02-22 | Stop reason: HOSPADM

## 2021-02-20 RX ORDER — ACETAMINOPHEN 325 MG/1
650 TABLET ORAL EVERY 6 HOURS PRN
Status: DISCONTINUED | OUTPATIENT
Start: 2021-02-20 | End: 2021-02-22 | Stop reason: HOSPADM

## 2021-02-20 RX ORDER — SENNA PLUS 8.6 MG/1
1 TABLET ORAL DAILY PRN
Status: DISCONTINUED | OUTPATIENT
Start: 2021-02-20 | End: 2021-02-22 | Stop reason: HOSPADM

## 2021-02-20 RX ADMIN — LABETALOL HYDROCHLORIDE 10 MG: 5 INJECTION INTRAVENOUS at 15:24

## 2021-02-20 RX ADMIN — NITROGLYCERIN 0.5 INCH: 20 OINTMENT TOPICAL at 16:02

## 2021-02-20 RX ADMIN — ATORVASTATIN CALCIUM 80 MG: 80 TABLET, FILM COATED ORAL at 22:32

## 2021-02-20 RX ADMIN — ASPIRIN 324 MG: 81 TABLET, CHEWABLE ORAL at 16:28

## 2021-02-20 RX ADMIN — SODIUM CHLORIDE: 9 INJECTION, SOLUTION INTRAVENOUS at 19:02

## 2021-02-20 RX ADMIN — IOPAMIDOL 75 ML: 755 INJECTION, SOLUTION INTRAVENOUS at 15:20

## 2021-02-20 RX ADMIN — ACETAMINOPHEN 1000 MG: 500 TABLET ORAL at 21:19

## 2021-02-20 RX ADMIN — Medication 10 ML: at 21:33

## 2021-02-20 NOTE — ED NOTES
Patient states her arm no longer feels heavy. Dr. Neha Lehman aware.       Cristal Houser, RN  02/20/21 9558

## 2021-02-20 NOTE — PROGRESS NOTES
Pt admitted from ED to Room C4 0449/01. Pt is alert and oriented X4, denies any arm, leg or tongue  Numbness. Dean in place and patent. On tele at 72.  Oriented to unit , rule, hourly rounding, call light use

## 2021-02-20 NOTE — H&P
Old medical records show that patient visits her PCP annually for health maintenance. It has been recommended for 3 years that she be initiated on statin therapy, antihypertensive medication, and treatment of her prediabetic state. She has declined to follow medical recommendations provided with each visit. The patient is also on no antiplatelet agent PTA. Despite advanced age with cardiac risk factors, she continues to take OCP daily. REVIEW OF SYSTEMS:   Constitutional: Negative for fever,chills or night sweats  ENT: Negative for rhinorrhea, epistaxis, hoarseness, sore throat. Respiratory: Negative for shortness of breath,wheezing  Cardiovascular: Negative for chest pain, palpitations   Gastrointestinal: Negative for nausea, vomiting, diarrhea  Genitourinary: Negative for polyuria, dysuria   Hematologic/Lymphatic: Negative for bleeding tendency, easy bruising  Musculoskeletal: Negative for myalgias and arthralgias  Neurologic: Positive motor weakness LUE and LLE; tongue paresthesia without dysarthria; no LOC  Skin: Negative for itching,rash  Psychiatric: Negative for depression,anxiety, agitation. Endocrine: Negative for polydipsia,polyuria,heat /cold intolerance. Past Medical History:   has a past medical history of Decorative tattoo and Thyroid disease. Past Surgical History:   has a past surgical history that includes cyst removal.     Medications:  No current facility-administered medications on file prior to encounter.       Current Outpatient Medications on File Prior to Encounter   Medication Sig Dispense Refill    SYNTHROID 75 MCG tablet TAKE ONE TABLET BY MOUTH DAILY 30 tablet 5    JUNEL 1/20 1-20 MG-MCG per tablet Take 1 tablet by mouth daily      Levocetirizine Dihydrochloride (XYZAL ALLERGY 24HR PO) Take by mouth daily         Allergies:  No Known Allergies     Social History: Patient Lives  reports that she has never smoked. She has never used smokeless tobacco. She reports current alcohol use. She reports that she does not use drugs. Family History:  family history includes Diabetes in her father, mother, paternal aunt, and paternal uncle; Heart Disease in her mother. Physical Exam:  BP (!) 179/66   Pulse 74   Temp 98.7 °F (37.1 °C) (Oral)   Resp 19   Wt 157 lb 1.6 oz (71.3 kg)   SpO2 97%   BMI 28.73 kg/m²     General appearance:  Appears comfortable. Well nourished  Eyes: Sclera clear, pupils equal  ENT: Moist mucus membranes, no thrush. Trachea midline. Cardiovascular: Regular rhythm, normal S1, S2. No murmur, gallop, rub. No edema in lower extremities  Respiratory: CTA B without wheezes rales or rhonchi  Gastrointestinal: Abdomen soft, non-tender, not distended, normal bowel sounds  Musculoskeletal: No cyanosis in digits, neck supple  Neurology: Cranial nerves grossly intact. Alert and oriented in time, place and person. No speech or motor deficits  Psychiatry: Appropriate affect.   Poor insight into morbidity mortality associated with noncompliant with recommended medication treatment  Skin: Warm, dry, normal turgor, no rash  Brisk capillary refill, peripheral pulses palpable   Labs:  CBC:   Lab Results   Component Value Date    WBC 5.8 02/20/2021    RBC 4.01 02/20/2021    HGB 13.6 02/20/2021    HCT 39.9 02/20/2021    MCV 99.6 02/20/2021    MCH 33.9 02/20/2021    MCHC 34.0 02/20/2021    RDW 13.3 02/20/2021     02/20/2021    MPV 8.5 02/20/2021     BMP:    Lab Results   Component Value Date     02/20/2021    K 3.8 02/20/2021     02/20/2021    CO2 22 02/20/2021    BUN 11 02/20/2021    CREATININE 0.9 02/20/2021    CALCIUM 9.5 02/20/2021    GFRAA >60 02/20/2021    GFRAA >60 07/12/2010    LABGLOM >60 02/20/2021    GLUCOSE 132 02/20/2021     XR CHEST PORTABLE   Final Result   No active cardiopulmonary disease         CTA HEAD NECK W CONTRAST Final Result   Unremarkable CTA of the head and neck. CT HEAD WO CONTRAST   Final Result   No acute intracranial abnormality. Critical results were called by Dr. Anny Bruce MD to Freddy Yolo on   2/20/2021 at 15:20. Chest Xray:   EKG:    Ventricular Rate 66 P BPM QTc Calculation (Bazett) 429 P ms   Atrial Rate 66 P BPM P Axis 41 P degrees   P-R Interval 144 P ms R Axis -11 P degrees   QRS Duration 94 P ms T Axis 11 P degrees   Q-T Interval 410 P ms Diagnosis Normal sinus rhythmNormal ECGWhen compared with ECG of 10-JARRET-2016        I visualized CXR images and EKG strips and agree with documented interpretation    Discussed case  with ED provider    Problem List  Principal Problem:    RIND (reversible ischemic neurologic deficit), acute (HCC)  Active Problems:    Mixed hyperlipidemia    Prediabetes    Hypertension, uncontrolled    Family history of coronary artery disease    Acquired hypothyroidism  Resolved Problems:    * No resolved hospital problems. *        Assessment/Plan:     TIA versus RIND  Admit to telemetry under CVA pathway with every 4 hours neuro checks overnight  Initiate EC ASA 81 mg daily antiplatelet agent as well as high-dose statin therapy; FLP pending  Aspiration precautions and fall protocol in place  Echo scheduled for a.m.   MRI of the head with and without contrast scheduled for a.m.  As needed labetalol scheduled for extreme BP elevation; will allow permissive hypertension initially to ensure watershed blood flow remains intact  PT/OT/SLP scheduled for a.m.  Neurology consult placed for further recommendations    Acute hyperglycemia with history of \"prediabetes\"  A1c pending  Patient not currently prescribed hypoglycemic medications PTA  Medium dose Humalog SSI scheduled as needed before meals and at bedtime  Carb restriction placed on diet  Consultation placed to nutrition for ADA dietary education    Hypothyroidism TSH and free T4 levels collected with results pending  Continue home dosage of levothyroxine with adjustments to be made based on thyroid function study results      DVT prophylaxisBLE SCD; no chemical anticoagulation due to risk of ICH in the setting of uncontrolled HTN  Code statusfull code  Dietn.p.o. pending bedside swallow advanced as tolerated to cardiac/GAYATHRI/75 g carb restriction  IV accessPIV established in ED    Admit as inpatient. I anticipate hospitalization spanning more than two midnights for investigation and treatment of the above medically necessary diagnoses. Please note that some part of this chart was generated using Dragon dictation software. Although every effort was made to ensure the accuracy of this automated transcription, some errors in transcription may have occurred inadvertently. If you may need any clarification, please do not hesitate to contact me through [facility].        Karel Jimenez MD    2/20/2021 4:12 PM

## 2021-02-20 NOTE — ED NOTES
Dr. Juliana Zelaya states to give Plavix. Bedside report given to floor nurse, nurse aware to call pharmacy for plavix order.       Stepan Zuniga RN  02/20/21 9780

## 2021-02-20 NOTE — ED PROVIDER NOTES
Exam  ED Triage Vitals   BP Temp Temp Source Pulse Resp SpO2 Height Weight   02/20/21 1458 02/20/21 1456 02/20/21 1456 02/20/21 1456 02/20/21 1456 02/20/21 1456 -- --   (!) 186/101 98.7 °F (37.1 °C) Oral 90 17 98 %       Nursing note and vitals reviewed. Constitutional: In no acute distress  HENT:      Head: Normocephalic      Ears: External ears normal.      Nose: Nose normal.     Mouth: Membrane mucosa moist   Eyes: No discharge. Neck: Supple. Trachea midline. Cardiovascular: Regular rate. Warm extremities  Pulmonary/Chest: Effort normal. No respiratory distress. CTAB. Abdominal: Soft. No distension. Nontender  : Deferred  Rectal: Deferred   Musculoskeletal: Moves all extremities. No gross deformity. Neurological: Alert and oriented. Face symmetric. Speech is clear. Skin: Warm and dry. No rash. Psychiatric: Normal mood and affect. Behavior is normal.    Procedures      EKG  The Ekg interpreted by me in the absence of a cardiologist shows. Normal sinus rhythm. No specific ST-T wave changes appreciated. No significant change from prior EKG dated jan 2016  Hr 87      Radiology  XR CHEST PORTABLE   Final Result   No active cardiopulmonary disease         CTA HEAD NECK W CONTRAST   Final Result   Unremarkable CTA of the head and neck. CT HEAD WO CONTRAST   Final Result   No acute intracranial abnormality. Critical results were called by Dr. Gustavo Raines MD to Marielakeerthi Zaidi on   2/20/2021 at 15:20.              Labs  Results for orders placed or performed during the hospital encounter of 02/20/21   CBC Auto Differential   Result Value Ref Range    WBC 5.8 4.0 - 11.0 K/uL    RBC 4.01 4.00 - 5.20 M/uL    Hemoglobin 13.6 12.0 - 16.0 g/dL    Hematocrit 39.9 36.0 - 48.0 %    MCV 99.6 80.0 - 100.0 fL    MCH 33.9 26.0 - 34.0 pg    MCHC 34.0 31.0 - 36.0 g/dL    RDW 13.3 12.4 - 15.4 %    Platelets 273 000 - 643 K/uL    MPV 8.5 5.0 - 10.5 fL    Neutrophils % 63.6 %    Lymphocytes % 28.9 % Monocytes % 5.6 %    Eosinophils % 1.4 %    Basophils % 0.5 %    Neutrophils Absolute 3.7 1.7 - 7.7 K/uL    Lymphocytes Absolute 1.7 1.0 - 5.1 K/uL    Monocytes Absolute 0.3 0.0 - 1.3 K/uL    Eosinophils Absolute 0.1 0.0 - 0.6 K/uL    Basophils Absolute 0.0 0.0 - 0.2 K/uL   Comprehensive Metabolic Panel w/ Reflex to MG   Result Value Ref Range    Sodium 136 136 - 145 mmol/L    Potassium reflex Magnesium 3.8 3.5 - 5.1 mmol/L    Chloride 104 99 - 110 mmol/L    CO2 22 21 - 32 mmol/L    Anion Gap 10 3 - 16    Glucose 132 (H) 70 - 99 mg/dL    BUN 11 7 - 20 mg/dL    CREATININE 0.9 0.6 - 1.1 mg/dL    GFR Non-African American >60 >60    GFR African American >60 >60    Calcium 9.5 8.3 - 10.6 mg/dL    Total Protein 7.7 6.4 - 8.2 g/dL    Albumin 4.7 3.4 - 5.0 g/dL    Albumin/Globulin Ratio 1.6 1.1 - 2.2    Total Bilirubin 0.5 0.0 - 1.0 mg/dL    Alkaline Phosphatase 85 40 - 129 U/L    ALT 27 10 - 40 U/L    AST 21 15 - 37 U/L    Globulin 3.0 g/dL   Troponin   Result Value Ref Range    Troponin <0.01 <0.01 ng/mL   Protime-INR   Result Value Ref Range    Protime 10.7 10.0 - 13.2 sec    INR 0.92 0.86 - 1.14   POCT Glucose   Result Value Ref Range    POC Glucose 122 (H) 70 - 99 mg/dl    Performed on ACCU-CHEK    EKG 12 Lead   Result Value Ref Range    Ventricular Rate 66 BPM    Atrial Rate 66 BPM    P-R Interval 144 ms    QRS Duration 94 ms    Q-T Interval 410 ms    QTc Calculation (Bazett) 429 ms    P Axis 41 degrees    R Axis -11 degrees    T Axis 11 degrees    Diagnosis       Normal sinus rhythmNormal ECGWhen compared with ECG of 10-JARRET-2016 00:33,No significant change was found       Screenings  NIH Stroke Scale  NIH Stroke Scale Assessed: Yes  Interval: Reassessment  Level of Consciousness (1a. ): Alert  LOC Questions (1b. ):  Answers both correctly  LOC Commands (1c. ): Performs both tasks correctly  Best Gaze (2. ): Normal  Visual (3. ): No visual loss  Facial Palsy (4. ): Normal symmetrical movement Motor Arm, Left (5a. ): No drift  Motor Arm, Right (5b. ): No drift  Motor Leg, Left (6a. ): No drift  Motor Leg, Right (6b. ): No drift  Limb Ataxia (7. ): Absent  Sensory (8. ): Normal  Best Language (9. ): No aphasia  Dysarthria (10. ): Normal  Extinction and Inattention (11): No abnormality  Total: 0Glasgow Coma Scale  Eye Opening: Spontaneous  Best Verbal Response: Oriented  Best Motor Response: Obeys commands  [place] Coma Scale Score: 15       [facility] and ED Course  Patient is a 29-year-old woman who presents with acute onset at 2:25 PM of left arm and leg weakness and tongue numbness. Activated a stroke alert. NIH stroke scale 3. Symptoms are disabling. Went through contraindications of TPA. Is significantly hypertensive with systolics in the 370G therefore was given 10 mg labetalol. Spoke with Dr. Yves Davis stroke team who recommended TPA administration if blood pressure improved. Euglycemic. Discussed risks and benefits including intracranial bleed with the patient and her  at bedside. Shared infographic all percentage of patients who improve, no change, and get worse with TPA. ([facility])    Per radiology and Dr. Mike Woo, no intracranial bleed found    Reassessed the patient at 3:30 PM.  Arm deficit seems slightly worse than before. Blood pressure now systolic 439. Reassessed the patient at 3:50pm and now asymptomatic. NIH stroke scale 0. We will cancel TPA order. Spoke with Manav Mandel who recommended 600 plavix, 325 aspirin to prevent reoccurrance. Called back to the patient's room at about 4 PM.  Left arm is feeling heavier. Slower finger-to-nose. NIH stroke scale still 0. After discussion with patient and , symptoms are not disabling. Because of this, withholding TPA. Will discuss with  stroke team again. Spoke with Oscar again. Recommended give aspirin now. Give plavix in 1-2 hours if symptoms remain stable. The total critical care time spent while evaluating and treating this patient was at least 40 minutes. This excludes time spent doing separately billable procedures. This includes time at the bedside, data interpretation, medication management, obtaining critical history from collateral sources if the patient is unable to provide it directly, and physician consultation. Specifics of interventions taken and potentially life-threatening diagnostic considerations are listed above in the medical decision making. [unfilled]    Final Impression  1. Cerebrovascular accident (CVA), unspecified mechanism ([facility]        Blood pressure (!) 179/66, pulse 74, temperature 98.7 °F (37.1 °C), temperature source Oral, resp. rate 19, weight 157 lb 1.6 oz (71.3 kg), SpO2 97 %. Disposition:  DISPOSITION        Patient Referrals:  No follow-up provider specified. Discharge Medications:  New Prescriptions    No medications on file       Discontinued Medications:  Discontinued Medications    No medications on file       This chart was generated using the [address] [place] [address] dictation system. I created this record but it may contain dictation errors given the limitations of this technology.         Hong Skinner MD  02/20/21 [address] Richard Zhu MD  02/20/21 2018

## 2021-02-20 NOTE — ED NOTES
After insertion of gibbons catheter, patient states to RN \"I can move my arm\". Patient moving arm and leg without difficulty. Dr. Wilver Toro called to bedside for re-evaluation.       Brianna Garcia RN  02/20/21 8795

## 2021-02-20 NOTE — ED NOTES
Patients necklace and two earrings removed for CT scan. Given to patient .      Mickie Clark RN  02/20/21 0593

## 2021-02-20 NOTE — ED NOTES
Verbal order from Dr. Sasha Macdonald to administer TPA. RN placing gibbons catheter while Megan Carlos RN mixing TPA.          Yuriy Elder RN  02/20/21 3255

## 2021-02-20 NOTE — ED NOTES
1457 - announced walkies per Dr Mary Saxena activation of code stroke on pt at this time  [id number] - called CT, table is open per [phone] - called  stroke team   1500 - called lab to be on the lookout for pt's blood work   1501 - Dr Ada Browne called back to speak with Dr Jacklyn Morales - Dr Ada Browne called back again to speak with Dr Mary Saxena  2422 - called  Stroke team again per Dr Callie Peacock - Dr Ada Browne called back      Roney Crews  02/20/21 1611

## 2021-02-21 ENCOUNTER — APPOINTMENT (OUTPATIENT)
Dept: MRI IMAGING | Age: 51
DRG: 069 | End: 2021-02-21
Payer: COMMERCIAL

## 2021-02-21 LAB
A/G RATIO: 1.7 (ref 1.1–2.2)
ALBUMIN SERPL-MCNC: 4.2 G/DL (ref 3.4–5)
ALP BLD-CCNC: 73 U/L (ref 40–129)
ALT SERPL-CCNC: 23 U/L (ref 10–40)
ANION GAP SERPL CALCULATED.3IONS-SCNC: 10 MMOL/L (ref 3–16)
AST SERPL-CCNC: 17 U/L (ref 15–37)
BASOPHILS ABSOLUTE: 0 K/UL (ref 0–0.2)
BASOPHILS RELATIVE PERCENT: 0.3 %
BILIRUB SERPL-MCNC: 0.7 MG/DL (ref 0–1)
BUN BLDV-MCNC: 10 MG/DL (ref 7–20)
CALCIUM SERPL-MCNC: 9.5 MG/DL (ref 8.3–10.6)
CHLORIDE BLD-SCNC: 107 MMOL/L (ref 99–110)
CHOLESTEROL, TOTAL: 251 MG/DL (ref 0–199)
CO2: 24 MMOL/L (ref 21–32)
CREAT SERPL-MCNC: 0.9 MG/DL (ref 0.6–1.1)
EKG ATRIAL RATE: 66 BPM
EKG DIAGNOSIS: NORMAL
EKG P AXIS: 41 DEGREES
EKG P-R INTERVAL: 144 MS
EKG Q-T INTERVAL: 410 MS
EKG QRS DURATION: 94 MS
EKG QTC CALCULATION (BAZETT): 429 MS
EKG R AXIS: -11 DEGREES
EKG T AXIS: 11 DEGREES
EKG VENTRICULAR RATE: 66 BPM
EOSINOPHILS ABSOLUTE: 0.1 K/UL (ref 0–0.6)
EOSINOPHILS RELATIVE PERCENT: 1.2 %
ESTIMATED AVERAGE GLUCOSE: 119.8 MG/DL
GFR AFRICAN AMERICAN: >60
GFR NON-AFRICAN AMERICAN: >60
GLOBULIN: 2.5 G/DL
GLUCOSE BLD-MCNC: 101 MG/DL (ref 70–99)
GLUCOSE BLD-MCNC: 104 MG/DL (ref 70–99)
GLUCOSE BLD-MCNC: 104 MG/DL (ref 70–99)
GLUCOSE BLD-MCNC: 121 MG/DL (ref 70–99)
GLUCOSE BLD-MCNC: 97 MG/DL (ref 70–99)
HBA1C MFR BLD: 5.8 %
HCT VFR BLD CALC: 37 % (ref 36–48)
HDLC SERPL-MCNC: 40 MG/DL (ref 40–60)
HEMOGLOBIN: 12.4 G/DL (ref 12–16)
LDL CHOLESTEROL CALCULATED: 176 MG/DL
LYMPHOCYTES ABSOLUTE: 2.6 K/UL (ref 1–5.1)
LYMPHOCYTES RELATIVE PERCENT: 29.3 %
MCH RBC QN AUTO: 33.5 PG (ref 26–34)
MCHC RBC AUTO-ENTMCNC: 33.4 G/DL (ref 31–36)
MCV RBC AUTO: 100.3 FL (ref 80–100)
MONOCYTES ABSOLUTE: 0.4 K/UL (ref 0–1.3)
MONOCYTES RELATIVE PERCENT: 4.7 %
NEUTROPHILS ABSOLUTE: 5.7 K/UL (ref 1.7–7.7)
NEUTROPHILS RELATIVE PERCENT: 64.5 %
PDW BLD-RTO: 13.3 % (ref 12.4–15.4)
PERFORMED ON: ABNORMAL
PERFORMED ON: NORMAL
PLATELET # BLD: 249 K/UL (ref 135–450)
PMV BLD AUTO: 9.3 FL (ref 5–10.5)
POTASSIUM REFLEX MAGNESIUM: 4.3 MMOL/L (ref 3.5–5.1)
RBC # BLD: 3.69 M/UL (ref 4–5.2)
SODIUM BLD-SCNC: 141 MMOL/L (ref 136–145)
TOTAL PROTEIN: 6.7 G/DL (ref 6.4–8.2)
TRIGL SERPL-MCNC: 177 MG/DL (ref 0–150)
VLDLC SERPL CALC-MCNC: 35 MG/DL
WBC # BLD: 8.9 K/UL (ref 4–11)

## 2021-02-21 PROCEDURE — G0378 HOSPITAL OBSERVATION PER HR: HCPCS

## 2021-02-21 PROCEDURE — 93010 ELECTROCARDIOGRAM REPORT: CPT | Performed by: INTERNAL MEDICINE

## 2021-02-21 PROCEDURE — 6370000000 HC RX 637 (ALT 250 FOR IP): Performed by: NURSE PRACTITIONER

## 2021-02-21 PROCEDURE — 70553 MRI BRAIN STEM W/O & W/DYE: CPT

## 2021-02-21 PROCEDURE — 83036 HEMOGLOBIN GLYCOSYLATED A1C: CPT

## 2021-02-21 PROCEDURE — 85025 COMPLETE CBC W/AUTO DIFF WBC: CPT

## 2021-02-21 PROCEDURE — 80053 COMPREHEN METABOLIC PANEL: CPT

## 2021-02-21 PROCEDURE — 97161 PT EVAL LOW COMPLEX 20 MIN: CPT

## 2021-02-21 PROCEDURE — 6360000004 HC RX CONTRAST MEDICATION: Performed by: PSYCHIATRY & NEUROLOGY

## 2021-02-21 PROCEDURE — 97165 OT EVAL LOW COMPLEX 30 MIN: CPT

## 2021-02-21 PROCEDURE — 6370000000 HC RX 637 (ALT 250 FOR IP): Performed by: HOSPITALIST

## 2021-02-21 PROCEDURE — 36415 COLL VENOUS BLD VENIPUNCTURE: CPT

## 2021-02-21 PROCEDURE — 1200000000 HC SEMI PRIVATE

## 2021-02-21 PROCEDURE — 80061 LIPID PANEL: CPT

## 2021-02-21 PROCEDURE — 2580000003 HC RX 258: Performed by: HOSPITALIST

## 2021-02-21 PROCEDURE — A9579 GAD-BASE MR CONTRAST NOS,1ML: HCPCS | Performed by: PSYCHIATRY & NEUROLOGY

## 2021-02-21 RX ADMIN — ASPIRIN 81 MG: 81 TABLET, COATED ORAL at 10:00

## 2021-02-21 RX ADMIN — Medication 10 ML: at 10:00

## 2021-02-21 RX ADMIN — ATORVASTATIN CALCIUM 80 MG: 80 TABLET, FILM COATED ORAL at 21:27

## 2021-02-21 RX ADMIN — Medication 10 ML: at 21:28

## 2021-02-21 RX ADMIN — LEVOTHYROXINE SODIUM 75 MCG: 0.03 TABLET ORAL at 05:45

## 2021-02-21 RX ADMIN — GADOTERIDOL 15 ML: 279.3 INJECTION, SOLUTION INTRAVENOUS at 11:15

## 2021-02-21 RX ADMIN — ACETAMINOPHEN 650 MG: 325 TABLET ORAL at 05:46

## 2021-02-21 ASSESSMENT — PAIN SCALES - GENERAL
PAINLEVEL_OUTOF10: 0
PAINLEVEL_OUTOF10: 5
PAINLEVEL_OUTOF10: 5

## 2021-02-21 NOTE — PROGRESS NOTES
Physical Therapy    Facility/Department: [facility] C4 PCU  Initial Assessment    NAME: Thomas Acosta  : 1970  MRN: [id number]    Date of Service: 2021    Discharge Recommendations:  Home independently   PT Equipment Recommendations  Equipment Needed: No    Assessment   Assessment: Pt seen for PT Eval during admission with TIA workup. Pt presented to ED yesterday with c/o LLE, LUE, and tongue numbness; all symptoms have resolved per pt at time of eval. PTA pt living with family and indep with mobility and ADLs. Upon eval pt demonstrates good balance, good strength, and functional mobility is performed at IND-Mod IND level. No further PT warranted. Pt safe to d/c home. Prognosis: Excellent  Decision Making: Low Complexity  PT Education: Goals;PT Role;Plan of Care;Disease Specific Education  Patient Education: Pt educated on role of PT, importance of seeking medical care if further symptoms develop. Pt verbalizes understanding. REQUIRES PT FOLLOW UP: No  Activity Tolerance  Activity Tolerance: Patient Tolerated treatment well       Patient Diagnosis(es): The encounter diagnosis was Cerebrovascular accident (CVA), unspecified mechanism ([address] has a past medical history of Decorative tattoo and Thyroid disease. has a past surgical history that includes cyst removal.    Restrictions  Restrictions/Precautions  Restrictions/Precautions: Up as Tolerated(low fall risk per nsg assessment)     Vision/Hearing  Vision: Impaired  Vision Exceptions: Wears glasses for reading  Hearing: Within functional limits       Subjective  General  Chart Reviewed: Yes  Patient assessed for rehabilitation services?: Yes  Response To Previous Treatment: Not applicable  Family / Caregiver Present: Yes()  Referring Practitioner: Barrington Snider MD  Referral Date : 21  Diagnosis: TIA  Follows Commands: Within Functional Limits  General Comment  Comments: RN cleared pt for therapy.   Subjective Subjective: Pt resting in bed with  at bedside. She's agreeable to PT/OT Eval with education regarding role of PT/OT. Pain Screening  Patient Currently in Pain: Denies  Vital Signs  Patient Currently in Pain: Denies       Orientation  Orientation  Overall Orientation Status: Within Normal Limits     Social/Functional History  Social/Functional History  Lives With: Family(spouse & 2 daughters 25 & 21 yr. old (college/trade school) spouse works)  Type of Home: House  Home Layout: One level  Home Access: Stairs to enter without rails  Entrance Stairs - Number of Steps: 2  Bathroom Shower/Tub: Walk-in shower  Bathroom Toilet: Standard  ADL Assistance: Independent  Homemaking Responsibilities: Yes  Meal Prep Responsibility: Primary  Laundry Responsibility: Primary  Cleaning Responsibility: Primary  Shopping Responsibility: Primary  Ambulation Assistance: Independent(without AD)  Transfer Assistance: Independent  Active : Yes  Occupation: Unemployed  Leisure & Hobbies: crafts         Objective     Observation/Palpation  Posture: Good    AROM RLE (degrees)  RLE AROM: WFL  AROM LLE (degrees)  LLE AROM : WFL  Strength RLE  Strength RLE: WFL  Strength LLE  Strength LLE: WFL  Tone RLE  RLE Tone: Normotonic  Tone LLE  LLE Tone: Normotonic  Motor Control  Gross Motor?: WFL  Sensation  Overall Sensation Status: (Pt reports all numbness that brought her to hospital has resolved.)  Bed mobility  Supine to Sit: Modified independent  Sit to Supine: Modified independent  Scooting: Modified independent  Comment: HOB elevated 45*  Transfers  Sit to Stand: Independent  Stand to sit:  Independent  Comment: to/from EOB without assistive device  Ambulation  Ambulation?: Yes  WB Status: WBAT  Ambulation 1  Surface: level tile  Device: No Device  Assistance: Independent Quality of Gait: Pt amb casually with near normal pace and good balance, no noted gait deviations, able to easily converse while ambulating and able to turn head without change in gait. Distance: 450'  Comments: Pt denies dizziness, lightheadedness, nausea, and fatigue. Stairs/Curb  Stairs?: Yes  Stairs  # Steps : 3  Stairs Height: 6\"  Rails: Left ascending;Right ascending(light HHA bilat rails)  Device: No Device  Assistance: Supervision  Comment: No signs of imbalance, no signs of fatigue     Balance  Posture: Good  Sitting - Static: Good  Sitting - Dynamic: Good  Standing - Static: Good  Standing - Dynamic: Good        Plan   Plan  Times per week: 1 time treat only. Safety Devices  Type of devices: All fall risk precautions in place, Call light within reach, Left in bed    AM-PAC Score  AM-PAC Inpatient Mobility Raw Score : 24 (02/21/21 1243)  AM-PAC Inpatient T-Scale Score : 61.14 (02/21/21 1243)  Mobility Inpatient CMS 0-100% Score: 0 (02/21/21 [facility])  Mobility Inpatient CMS G-Code Modifier : [address] (02/21/21 [facility])          Goals  Short term goals  Time Frame for Short term goals: No acute therapy goals set. 1x treat only.   Patient Goals   Patient goals : \"to go home today\"       Therapy Time   Individual Concurrent Group Co-treatment   Time In 8906         Time Out 1227         Minutes 11         Timed Code Treatment Minutes: 1 Minutes + 10 Minutes Evaluation       Paul Ennis, PT, DPT

## 2021-02-21 NOTE — PROGRESS NOTES
Jeannine Raymundo perfect served,    2/21/21 3:46 PM   [address] [facility] Facility: [facility] From: Nelson Padilla RE: Rayna Rodriguez 1970 RM: C4 0449/01 Would like to be discharged. MRI complete. Thank you.  Need Callback: NO CALLBACK REQ C4 PROGRESSIVE CARE  Unread

## 2021-02-21 NOTE — PROGRESS NOTES
Occupational Therapy   Occupational Therapy Initial/discharge Assessment  1 x only    Date: 2021   Patient Name: Paty Pizano  MRN: [id number]     : 1970    Date of Service: 2021    Discharge Recommendations:  Home independently       Assessment      OT Education: OT Role;Precautions  Patient Education: disease specific:  importance of \"BEFAST\"  No Skilled OT: At baseline function; No OT goals identified  Activity Tolerance  Activity Tolerance: Patient Tolerated treatment well  Safety Devices  Safety Devices in place: Yes  Type of devices: Call light within reach; Left in bed           Patient Diagnosis(es): The encounter diagnosis was Cerebrovascular accident (CVA), unspecified mechanism ([address] has a past medical history of Decorative tattoo and Thyroid disease.    has a past surgical history that includes cyst removal.           Restrictions  Restrictions/Precautions  Restrictions/Precautions: Up as Tolerated(low fall risk per nsg assessment)  Position Activity Restriction  Other position/activity restrictions: telemetry    Subjective   General  Chart Reviewed: Yes  Patient assessed for rehabilitation services?: Yes  Family / Caregiver Present: Yes(spouse)  Referring Practitioner: Dr. Leticia Diaz  Diagnosis: RIND with Left side weakness; -tPA  General Comment  Comments: RN cleared pt for OT eval; pt sitting up in bed agreeable to therapy eval  Patient Currently in Pain: Denies  Pain Assessment  Pain Assessment: 0-10  Pain Level: 0  Vital Signs  Temp: 98.8 °F (37.1 °C)  Temp Source: Oral  Pulse: 70  Heart Rate Source: Monitor  Resp: 18  BP: (!) 168/91  BP Location: Right upper arm  Patient Position: Semi fowlers  Level of Consciousness: Alert (0)  MEWS Score: 1  Patient Currently in Pain: Denies  Oxygen Therapy  SpO2: 99 %  Pulse Oximeter Device Mode: Intermittent  Pulse Oximeter Device Location: Right;Finger  O2 Device: None (Room air)  Patient Observation Overall Sensation Status: (Pt reports all numbness that brought her to hospital has resolved.)        LUE AROM : WFL  RUE AROM : WFL    Gross LUE Strength: WFL  Gross RUE Strength: WFL              Plan    OT eval only    AM-PAC Score        AM-PAC Inpatient Daily Activity Raw Score: 24 (02/21/21 1305)  AM-PAC Inpatient ADL T-Scale Score : 57.54 (02/21/21 1305)  ADL Inpatient CMS 0-100% Score: 0 (02/21/21 1305)  ADL Inpatient CMS G-Code Modifier : [facility] (02/21/21 1305)    Goals  Patient Goals   Patient goals : go home       Therapy Time   Individual Concurrent Group Co-treatment   Time In 1213         Time Out 1228         Minutes [address]

## 2021-02-21 NOTE — PROGRESS NOTES
Patient has a past medical history of Decorative tattoo and Thyroid disease. Comorbidities and risk factors for stroke reviewed and education provided as appropriate. Patient and/or family's stated goal of care: Achievement of self care     Reviewed the Following Education with Patient and/or Family:   Signs and Symptoms of Stroke-Reviewed FAST   Facial Drooping   Arm Weakness   Speech Difficulty   Time to Call 911 and how to activate EMS (911)   Importance of Follow Up Appointment at Discharge   Importance of Compliance with Medications Prescribed at Discharge     Pt verbalized understanding.      Family Present during Education: no     Stroke Education Booklet at Bedside: no     Total Education Time: 15 Minutes

## 2021-02-21 NOTE — PROGRESS NOTES
Discussed pt's individual risk factors for stroke: htn, not exercising, family hx of diabetes,birth control pills, declining to start on bp meds. Pt. Made aware of s/s stroke warning signs, activating ems,taking prescribed meds such as statin and bp meds, aspirin, need to follow up w/ pcp. Consequences of non-compliance such as a severe stroke were discussed w/ pt. Time spent = 15 minutes.

## 2021-02-21 NOTE — PROGRESS NOTES
Troponin T:  Negative x 3  INR:  0.92    POC Glucose:   Recent Labs     02/20/21  1453 02/20/21  2042 02/21/21  0752 02/21/21  1138   POCGLU 122* 127* 104* 121*     Lipid panel (10/9/20)  Cholesterol, Total 256 (H)    Triglycerides 237 (H)   HDL 47    LDL Calculated 162 (H)     Noncontrast CT head (2/20) No acute intracranial abnormality. CTA head / neck (2/20) Unremarkable CTA of the head and neck. Portable CXR (2/20) No active cardiopulmonary disease    MRI brain (2/21) No acute infarct or acute intracranial process identified. Mild chronic small vessel ischemic changes. Objective:   Vitals:  BP (!) 162/82   Pulse 76   Temp 98.5 °F (36.9 °C) (Oral)   Resp 18   Ht 5' 2\" (1.575 m)   Wt 165 lb 3.2 oz (74.9 kg)   SpO2 99%   BMI 30.22 kg/m²   General appearance: alert and cooperative with exam  Lungs: clear to auscultation bilaterally  Heart: regular rate and rhythm, S1, S2 normal, no murmur, click, rub or gallop  Abdomen: soft, non-tender; bowel sounds normal; no masses,  no organomegaly  Extremities: extremities normal, atraumatic, no cyanosis or edema  Neurologic: No obvious focal neurologic deficits. Assessment and Plan:   1. Transient ischemic attack versus completed stroke:  Continues on aspirin 81 mg and atorvastatin 80 mg daily. PT/OT evaluation. SLP evaluation. NIH stroke scale still 0. Neurology consultation pending. Dose of Plavix pending neurology evaluation. Family wants to wait for neurology. 2. Hyperglycemia with h/o pre-diabetes (HgbA1c 5.9%): Cover with a \"sliding scale\" lispro moderate scale prandial correction insulin. Carbohydrate controlled diet. May benefit from metformin as outpatient. 3. Hypertension:  Permissive hypertension with SBP < 180 mmHg 48-72 hrs post cerebral ischemic event. History of borderline hypertension. 4. Dyslipidemia ( on 10/9/20):  Continues on atorvastatin 80 mg nightly. 5. Hypothyroid (TSH 0.98 uIU/mL 12/22/20):  Continues on levothyroxine 75 mcg daily. Advance Directive: Full Code  DVT prophylaxis with enoxaparin 40 mg sub-Q daily.    Discharge planning:  Monday, Feb 22 pending neurology evaluation      Tanmay Dao MD  [facility]

## 2021-02-22 VITALS
SYSTOLIC BLOOD PRESSURE: 146 MMHG | BODY MASS INDEX: 30.4 KG/M2 | HEART RATE: 72 BPM | WEIGHT: 165.2 LBS | DIASTOLIC BLOOD PRESSURE: 92 MMHG | TEMPERATURE: 97.8 F | OXYGEN SATURATION: 98 % | RESPIRATION RATE: 18 BRPM | HEIGHT: 62 IN

## 2021-02-22 LAB
ESTIMATED AVERAGE GLUCOSE: 114 MG/DL
GLUCOSE BLD-MCNC: 136 MG/DL (ref 70–99)
GLUCOSE BLD-MCNC: 99 MG/DL (ref 70–99)
HBA1C MFR BLD: 5.6 %
LV EF: 58 %
LVEF MODALITY: NORMAL
PERFORMED ON: ABNORMAL
PERFORMED ON: NORMAL
PERFORMED ON: NORMAL

## 2021-02-22 PROCEDURE — 6370000000 HC RX 637 (ALT 250 FOR IP): Performed by: HOSPITALIST

## 2021-02-22 PROCEDURE — 93306 TTE W/DOPPLER COMPLETE: CPT

## 2021-02-22 PROCEDURE — 2580000003 HC RX 258: Performed by: HOSPITALIST

## 2021-02-22 PROCEDURE — 6370000000 HC RX 637 (ALT 250 FOR IP): Performed by: NURSE PRACTITIONER

## 2021-02-22 PROCEDURE — 99254 IP/OBS CNSLTJ NEW/EST MOD 60: CPT | Performed by: PSYCHIATRY & NEUROLOGY

## 2021-02-22 PROCEDURE — G0378 HOSPITAL OBSERVATION PER HR: HCPCS

## 2021-02-22 RX ORDER — ATORVASTATIN CALCIUM 80 MG/1
80 TABLET, FILM COATED ORAL NIGHTLY
Qty: 30 TABLET | Refills: 3 | Status: SHIPPED | OUTPATIENT
Start: 2021-02-22 | End: 2021-03-01 | Stop reason: SDUPTHER

## 2021-02-22 RX ORDER — ASPIRIN 81 MG/1
81 TABLET ORAL DAILY
Qty: 30 TABLET | Refills: 3 | Status: SHIPPED | OUTPATIENT
Start: 2021-02-23 | End: 2021-06-30 | Stop reason: SDUPTHER

## 2021-02-22 RX ORDER — CLOPIDOGREL BISULFATE 75 MG/1
75 TABLET ORAL DAILY
Qty: 21 TABLET | Refills: 0 | Status: SHIPPED | OUTPATIENT
Start: 2021-02-22 | End: 2021-04-13 | Stop reason: ALTCHOICE

## 2021-02-22 RX ADMIN — ACETAMINOPHEN 650 MG: 325 TABLET ORAL at 04:55

## 2021-02-22 RX ADMIN — Medication 10 ML: at 08:38

## 2021-02-22 RX ADMIN — ASPIRIN 81 MG: 81 TABLET, COATED ORAL at 08:38

## 2021-02-22 RX ADMIN — LEVOTHYROXINE SODIUM 75 MCG: 0.03 TABLET ORAL at 05:30

## 2021-02-22 NOTE — PROGRESS NOTES
Speech Language Pathology  Consult Note    SLP received consult and completed chart review. RN reports pt is having no difficulty with speaking/swallowing, and formal evaluation is not indicated at this time. Please re-consult SLP if such need should arise in the future. Yuliana Quintana [facility], [id number]  Speech-Language Pathologist  (desk #): [phone]

## 2021-02-22 NOTE — CONSULTS
In patient Neurology consult        [facility] Neurology      MD Tushar Dumont  1970    Date of Service: 2/22/2021    Referring Physician: Broderick Gil MD      Reason for the consult and CC: New onset left-sided weakness and possible stroke. HPI:   The patient is a 44y.o.  years old female with history of hyper tension and hyperlipidemia who was admitted to the hospital over the weekend with acute left-sided weakness. Symptoms started few hours prior to admission. Description is left-sided weakness in both arm and leg followed by numbness and tingling. Degree was moderate to severe. Duration was persistent for few hours. No other triggers or other associated symptoms. No chest pain or headache or dysphagia or dysarthria. No neck or back pain or falling. No prior history of similar illness. No other relieving or aggravating factors. She came to the ED for evaluation. Initial work-up revealed unremarkable CT of the head and CTA of the head and neck. Blood pressure was elevated above 160 systolic. She was admitted to the hospital.  Today she feels back to her baseline. Symptoms improved overnight. Further work-up with MRI brain showed no acute stroke. Blood test reviewed today and showed A1c of 5.6, cholesterol 251 . She has diagnosed with hypertension however she was not taking medicine at home. She denies smoking. She was on OCP before she was admitted to the hospital for at least 2 years. No prior history of DVT or PE. No history of miscarriages. Other review of system was unremarkable.       Family History   Problem Relation Age of Onset    Diabetes Mother     Heart Disease Mother     Diabetes Father     Diabetes Paternal Aunt     Diabetes Paternal Uncle      Past Surgical History:   Procedure Laterality Date    CYST REMOVAL          Past Medical History:   Diagnosis Date    Decorative tattoo     Low back    Thyroid disease Social History     Tobacco Use    Smoking status: Never Smoker    Smokeless tobacco: Never Used   Substance Use Topics    Alcohol use: Yes     Comment: justin    Drug use: No     No Known Allergies  Current Facility-Administered Medications   Medication Dose Route Frequency Provider Last Rate Last Admin    sodium chloride flush 0.9 % injection 10 mL  10 mL Intravenous 2 times per day Vic Gibson MD   10 mL at 02/22/21 8214    sodium chloride flush 0.9 % injection 10 mL  10 mL Intravenous PRN Vic Gibson MD        clopidogrel (PLAVIX) tablet 600 mg  600 mg Oral Once Vic Gibson MD   Stopped at 02/20/21 1635    levothyroxine (SYNTHROID) tablet 75 mcg  75 mcg Oral Daily Vic Gibson MD   75 mcg at 02/22/21 0530    insulin lispro (HUMALOG) injection vial 0-12 Units  0-12 Units Subcutaneous TID WC Vic Gibson MD        insulin lispro (HUMALOG) injection vial 0-6 Units  0-6 Units Subcutaneous Nightly Vic Gibson MD        glucose (GLUTOSE) 40 % oral gel 15 g  15 g Oral PRN Vic Gibson MD        dextrose 50 % IV solution  12.5 g Intravenous PRN Vic Gibson MD        glucagon (rDNA) injection 1 mg  1 mg Intramuscular PRN Vic Gibson MD        dextrose 5 % solution  100 mL/hr Intravenous PRN Vic Gibson MD        perflutren lipid microspheres (DEFINITY) injection 1.65 mg  1.5 mL Intravenous ONCE PRN Vic Gibson MD        sodium chloride flush 0.9 % injection 10 mL  10 mL Intravenous 2 times per day Vic Gibson MD   10 mL at 02/22/21 3515    sodium chloride flush 0.9 % injection 10 mL  10 mL Intravenous PRN Vic Gibson MD        promethazine (PHENERGAN) tablet 12.5 mg  12.5 mg Oral Q6H PRN Vic Gibson MD        Or    ondansetron [facility]) injection 4 mg  4 mg Intravenous Q6H PRN Vic Gibson MD        senna (SENOKOT) tablet 8.6 mg  1 tablet Oral Daily PRN Vic Gibson MD  bisacodyl (DULCOLAX) suppository 10 mg  10 mg Rectal Daily PRN Kristina Valderrama MD        aspirin EC tablet 81 mg  81 mg Oral Daily Kristina Valderrama MD   81 mg at 02/22/21 6706    Or    aspirin suppository 300 mg  300 mg Rectal Daily Kristina Valderrama MD        atorvastatin (LIPITOR) tablet 80 mg  80 mg Oral Nightly Kristina Valderrama MD   80 mg at 02/21/21 2127    labetalol (NORMODYNE;TRANDATE) injection 10 mg  10 mg Intravenous Q10 Min PRN Kristina Valderrama MD        acetaminophen (TYLENOL) tablet 650 mg  650 mg Oral Q6H PRN RANJITH Dean NP   650 mg at 02/22/21 0455       ROS : A 10-14 system review of constitutional, cardiovascular, respiratory, eyes, musculoskeletal, endocrine, GI, ENT, skin, hematological, genitourinary, psychiatric and neurologic systems was obtained and updated today and is unremarkable except as mentioned in my HPI      Exam:     Constitutional:   Vitals:    02/21/21 2100 02/22/21 0450 02/22/21 0830 02/22/21 1230   BP: (!) 169/104 (!) 148/83 (!) 166/7 (!) 146/92   Pulse: 71 66 64 72   Resp: 18 18 16 18   Temp: 98.6 °F (37 °C) 98.3 °F (36.8 °C) 98.4 °F (36.9 °C) 97.8 °F (36.6 °C)   TempSrc: Oral Oral Axillary Axillary   SpO2: 99% 98% 92% 98%   Weight:       Height:           General appearance:  Normal development and appear in no acute distress. Eye:  Fundus: No blurring of optic disc. Neck: supple  Cardiovascular: No lower leg edema with good pulsation. No carotid bruit. No abnormal heart sounds  Mental Status:   Oriented to person, place, problem, and time. Memory: Good immediate recall. Intact remote memory  Normal attention span and concentration. Language: intact naming, repeating and fluency   Good fund of Knowledge. Aware of current events and vocabulary   Cranial Nerves:   II: Visual fields: Full to confrontation and nl VA. Pupils: equal, round, reactive to light  III,IV,VI: Extra Ocular Movements are intact.  No nystagmus  V: Facial sensation is intact DC OCP and follow-up with GYN regarding HRT  Discussed stroke prevention after TIA and other secondary preventive measures  Follow-up with PCP to consider 1 month event monitor to rule out paroxysmal A. fib  Can be discharged from neurology after the recommendation. Patient's instructions:    Discussed the following stroke prevention goals with the patient:     AP therapy: Risk and benefit, side effect and possible bleeding and the need to be consistent with AP therapy      Blood Pressure:  Goal  reduce BP 10/5 from baseline for all patients   History of hypertension: goal BP < 140/90  History of diabetes or renal disease: goal BP < 130/80  No history of hypertension: goal BP < 120/80     Dyslipidemia:  Atherosclerotic Stroke or TIA: LDL goal < 70mg/dl or 50% reduction in LDL from baseline  Symptomatic atherosclerotic cardiovascular disease and ? 76yo: high-intensity statin  Symptomatic atherosclerotic cardiovascular disease and > 76yo: moderate-intensity statin  Medications:   High-intensity statin: atorvastatin 40-80mg, rosuvastatin 20-40mg  Moderate-intensity statin: atorvastatin 10-20mg, rosuvastatin 5-10mg, simvastatin 20-40mg, pravastatin 40-80mg, lovastatin 40mg, fluvastatin 40mg bid (XL 80mg daily), pitavastatin 2-4mg  Side effects from statin were discussed and addressed the need to follow LFT and CK if necessary with PCP     Diabetes Mellitus:   Goal  HbA1c < 7%        Alcohol Consumption:  Non-pregnant women  one or fewer drinks per day     Physical Activity:  Goal  at least 30 minutes of moderate intensity physical exercise 1-3 times per week     Diet:  Low-fat, low-sodium, and Mediterranean or Dietary Approaches to Stop Hypertension (DASH) or Diabetic Diet when applicable     Obesity:  Goal BMI 18.5-25 kg/m2      Thank you for referring such patient. If you have any questions regarding my consult note, please don't hesitate to call me.      Sumanth Adler MD  [phone] This dictation was generated by voice recognition computer software.  Although all attempts are made to edit the dictation for accuracy, there may be errors in the  transcription that are not intended

## 2021-02-22 NOTE — PROGRESS NOTES
Hospitalist Progress Note      PCP: Sugey FLORES DO    Date of Admission: 2/20/2021    Chief Complaint: Left arm weakness    Hospital Course: Presented with acute left-sided weakness on the upper and lower extremities. It was transient. Currently asymptomatic. MRI without CVA. Subjective: Back to normal.  No chest pain, no shortness of breath, no nausea no vomiting      Medications:  Reviewed    Infusion Medications    dextrose       Scheduled Medications    sodium chloride flush  10 mL Intravenous 2 times per day    clopidogrel  600 mg Oral Once    levothyroxine  75 mcg Oral Daily    insulin lispro  0-12 Units Subcutaneous TID WC    insulin lispro  0-6 Units Subcutaneous Nightly    sodium chloride flush  10 mL Intravenous 2 times per day    aspirin  81 mg Oral Daily    Or    aspirin  300 mg Rectal Daily    atorvastatin  80 mg Oral Nightly     PRN Meds: sodium chloride flush, glucose, dextrose, glucagon (rDNA), dextrose, perflutren lipid microspheres, sodium chloride flush, promethazine **OR** ondansetron, senna, bisacodyl, labetalol, acetaminophen    No intake or output data in the 24 hours ending 02/22/21 1523    Physical Exam Performed:    BP (!) 146/92   Pulse 72   Temp 97.8 °F (36.6 °C) (Axillary)   Resp 18   Ht 5' 2\" (1.575 m)   Wt 165 lb 3.2 oz (74.9 kg)   SpO2 98%   BMI 30.22 kg/m²     General appearance: No apparent distress, appears stated age and cooperative. HEENT: Pupils equal, round, and reactive to light. Conjunctivae/corneas clear. Neck: Supple, with full range of motion. No jugular venous distention. Trachea midline. Respiratory:  Normal respiratory effort. Clear to auscultation, bilaterally without Rales/Wheezes/Rhonchi. Cardiovascular: Regular rate and rhythm with normal S1/S2 without murmurs, rubs or gallops. Abdomen: Soft, non-tender, non-distended with normal bowel sounds. Musculoskeletal: No clubbing, cyanosis or edema bilaterally. Full range of motion without deformity. Skin: Skin color, texture, turgor normal.  No rashes or lesions. Neurologic:  Neurovascularly intact without any focal sensory/motor deficits. Cranial nerves: II-XII intact, grossly non-focal.  Fully normal neurological exam  Psychiatric: Alert and oriented, thought content appropriate, normal insight  Capillary Refill: Brisk,< 3 seconds   Peripheral Pulses: +2 palpable, equal bilaterally       Labs:   Recent Labs     02/20/21  1500 02/21/21  0518   WBC 5.8 8.9   HGB 13.6 12.4   HCT 39.9 37.0    249     Recent Labs     02/20/21  1500 02/21/21  0518    141   K 3.8 4.3    107   CO2 22 24   BUN 11 10   CREATININE 0.9 0.9   CALCIUM 9.5 9.5     Recent Labs     02/20/21  1500 02/21/21  0518   AST 21 17   ALT 27 23   BILITOT 0.5 0.7   ALKPHOS 85 73     Recent Labs     02/20/21  1500   INR 0.92     Recent Labs     02/20/21  1500 02/20/21  1826 02/20/21  2231   TROPONINI <0.01 <0.01 <0.01       Urinalysis:      Lab Results   Component Value Date    NITRU Negative 01/10/2016    WBCUA 0-2 01/10/2016    BACTERIA Rare 01/10/2016    RBCUA 0-2 01/10/2016    BLOODU negative 06/22/2016    BLOODU TRACE-INTACT 01/10/2016    SPECGRAV <=1.005 06/22/2016    SPECGRAV 1.010 01/10/2016    GLUCOSEU negative 06/22/2016    GLUCOSEU 100 01/10/2016       Radiology:  MRI brain with and without contrast   Final Result   1. No acute infarct or acute intracranial process identified. 2. Mild chronic small vessel ischemic changes. XR CHEST PORTABLE   Final Result   No active cardiopulmonary disease         CTA HEAD NECK W CONTRAST   Final Result   Unremarkable CTA of the head and neck. CT HEAD WO CONTRAST   Final Result   No acute intracranial abnormality. Critical results were called by Dr. Kristen Zamarripa MD to [facility] on   2/20/2021 at 15:20.                  Assessment/Plan: Active Hospital Problems    Diagnosis    Hypertension, uncontrolled [I10]    Family history of coronary artery disease [Z82.49]    RIND (reversible ischemic neurologic deficit), acute ([facility] [address] [I63.9]    Prediabetes [R73.03]    Mixed hyperlipidemia [E78.2]    Acquired hypothyroidism [E03.9]     PLAN:    Acute left hemiparesis  Probably TIA. Continue aspirin and Lipitor. Awaiting neurology opinion. Hypertension  Acceptable blood pressure. Continue same    Dyslipidemia  Continue statin    Hypothyroidism  Controlled TSH past December. No changes needed. Continue Synthroid. Discussed with the patient, questions answered    DVT Prophylaxis: Lovenox  Diet: DIET GENERAL; Carb Control: 5 carb choices (75 gms)/meal; No Added Salt (3-4 GM)  Code Status: Full Code    PT/OT Eval Status: Ordered. Recommends home independently. Dispo -discharge when evaluated by neurology. Hopefully later today.     Daniela Perez MD

## 2021-02-22 NOTE — CARE COORDINATION
Spoke with patient at bedside. She lives at home with her  and children. She is independent at home. She denies any DME or services at home. Will likely discharge today with no needs.

## 2021-02-22 NOTE — DISCHARGE SUMMARY
Hospital Medicine Discharge Summary    Patient ID: Jamel Long      Patient's PCP: Carlos Samuels DO    Admit Date: 2/20/2021     Discharge Date:   02/22/21     Admitting Physician: Elie Lemus MD     Discharge Physician: Rosa Thorne MD     Discharge Diagnoses: Active Hospital Problems    Diagnosis    TIA involving right internal carotid artery [G45.1]    Dyslipidemia [E78.5]    HTN (hypertension), benign [I10]    Family history of coronary artery disease [Z82.49]    RIND (reversible ischemic neurologic deficit), acute (HCC) [I63.9]    Prediabetes [R73.03]    Mixed hyperlipidemia [E78.2]    Acquired hypothyroidism [E03.9]       The patient was seen and examined on day of discharge and this discharge summary is in conjunction with any daily progress note from day of discharge. Hospital Course:     Presented with acute left-sided weakness on the upper and lower extremities. It was transient. Currently asymptomatic. MRI without CVA. Evaluated by neurology. Diagnosed as TIA  Being discharged with 21 days of ASA + Plavix, then ASA only  Event monitor ordered  Statin started  Follow up with PCP  Stable and asymptomatic on the day of discharge          Physical Exam Performed:     BP (!) 146/92   Pulse 72   Temp 97.8 °F (36.6 °C) (Axillary)   Resp 18   Ht 5' 2\" (1.575 m)   Wt 165 lb 3.2 oz (74.9 kg)   SpO2 98%   BMI 30.22 kg/m²       General appearance:  No apparent distress, appears stated age and cooperative. HEENT:  Normal cephalic, atraumatic without obvious deformity. Pupils equal, round, and reactive to light. Extra ocular muscles intact. Conjunctivae/corneas clear. Neck: Supple, with full range of motion. No jugular venous distention. Trachea midline. Respiratory:  Normal respiratory effort. Clear to auscultation, bilaterally without Rales/Wheezes/Rhonchi. Cardiovascular:  Regular rate and rhythm with normal S1/S2 without murmurs, rubs or gallops. Abdomen: Soft, non-tender, non-distended with normal bowel sounds. Musculoskeletal:  No clubbing, cyanosis or edema bilaterally. Full range of motion without deformity. Skin: Skin color, texture, turgor normal.  No rashes or lesions. Neurologic:  Neurovascularly intact without any focal sensory/motor deficits. Cranial nerves: II-XII intact, grossly non-focal.  Psychiatric:  Alert and oriented, thought content appropriate, normal insight  Capillary Refill: Brisk,< 3 seconds   Peripheral Pulses: +2 palpable, equal bilaterally       Labs: For convenience and continuity at follow-up the following most recent labs are provided:      CBC:    Lab Results   Component Value Date    WBC 8.9 02/21/2021    HGB 12.4 02/21/2021    HCT 37.0 02/21/2021     02/21/2021       Renal:    Lab Results   Component Value Date     02/21/2021    K 4.3 02/21/2021     02/21/2021    CO2 24 02/21/2021    BUN 10 02/21/2021    CREATININE 0.9 02/21/2021    CALCIUM 9.5 02/21/2021         Significant Diagnostic Studies    Radiology:   MRI brain with and without contrast   Final Result   1. No acute infarct or acute intracranial process identified. 2. Mild chronic small vessel ischemic changes. XR CHEST PORTABLE   Final Result   No active cardiopulmonary disease         CTA HEAD NECK W CONTRAST   Final Result   Unremarkable CTA of the head and neck. CT HEAD WO CONTRAST   Final Result   No acute intracranial abnormality. Critical results were called by Dr. Tessa Carlos MD to [facility] on   2/20/2021 at 15:20. Consults:     IP CONSULT TO PHARMACY  PHARMACY TO CHANGE BASE FLUIDS  IP CONSULT TO HOSPITALIST  IP CONSULT TO NEUROLOGY    Disposition:  Home     Condition at Discharge: Stable    Discharge Instructions/Follow-up:  PCP.  Neurology if needed    Code Status:  Full Code     Activity: activity as tolerated    Diet: regular diet      Discharge Medications: Current Discharge Medication List           Details   aspirin 81 MG EC tablet Take 1 tablet by mouth daily  Qty: 30 tablet, Refills: 3      atorvastatin (LIPITOR) 80 MG tablet Take 1 tablet by mouth nightly  Qty: 30 tablet, Refills: 3      clopidogrel (PLAVIX) 75 MG tablet Take 1 tablet by mouth daily  Qty: 21 tablet, Refills: 0              Details   SYNTHROID 75 MCG tablet TAKE ONE TABLET BY MOUTH DAILY  Qty: 30 tablet, Refills: 5      JUNEL 1/20 1-20 MG-MCG per tablet Take 1 tablet by mouth daily      Levocetirizine Dihydrochloride (XYZAL ALLERGY 24HR PO) Take by mouth daily             Time Spent on discharge is more than 45 minutes in the examination, evaluation, counseling and review of medications and discharge plan. Signed:    Claire Lawton MD   2/22/2021      Thank you August FLORES DO for the opportunity to be involved in this patient's care. If you have any questions or concerns please feel free to contact me at [phone].

## 2021-02-24 ENCOUNTER — TELEPHONE (OUTPATIENT)
Dept: FAMILY MEDICINE CLINIC | Age: 51
End: 2021-02-24

## 2021-02-24 NOTE — ADT AUTH CERT
Transient Ischemic Attack (TIA) - Care Day 2 (2/21/2021) by Abilio Agarwal RN       Review Status Review Entered   Completed 2/24/2021 09:51      Criteria Review      Care Day: 2 Care Date: 2/21/2021 Level of Care: Intermediate Care    Guideline Day 2    Clinical Status    (X) * Hemodynamic stability    (X) * Blood pressure acceptable    2/24/2021 9:51 AM EST by Gina Pulliam      98.8  20  74  178/95, 162/82  99%ra      rates pain 5/10    ( ) * Resolution of presenting signs and symptoms    ( ) * No TIA recurrence or new neurologic deficit    ( ) * Etiology requiring inpatient treatment absent    ( ) * No bleeding secondary to antithrombotic medication    ( ) * Discharge plans and education understood    Activity    ( ) * Ambulatory or acceptable for next level of care    2/24/2021 9:51 AM EST by Gina Pulliam am pac score is 24/24    Routes    (X) * Oral hydration, medications, and diet    2/24/2021 9:51 AM EST by Gina Pulliam      tylenol 650mg given    (X) Usual diet    2/24/2021 9:51 AM EST by Gina Pulliam      carb controlled  ssi    Medications    ( ) * Anticoagulation as indicated for next level of care    2/24/2021 9:51 AM EST by Gina Pulliam      asa 81mg qd    ( ) Possible antihypertensive medication    2/24/2021 9:51 AM EST by Gina Pulliam      Permissive hypertension with SBP < 180 mmHg 48-72 hrs post cerebral ischemic event    (X) Statin    2/24/2021 9:51 AM EST by Gina Pulliam      lipitor 80mg qd    * Milestone   Additional Notes   2/21   Per Hospitalist:   1. Transient ischemic attack versus completed stroke:  Continues on aspirin 81 mg and atorvastatin 80 mg daily.  PT/OT evaluation.  SLP evaluation. 2001 [facility]  stroke scale still 0.  Neurology consultation pending.  Dose of Plavix pending neurology evaluation.  Family wants to wait for neurology. 2. Hyperglycemia with h/o pre-diabetes (HgbA1c 5.9%):  Cover with a \"sliding scale\" lispro moderate scale prandial correction insulin.   Carbohydrate controlled diet.   May benefit from metformin as outpatient. 3. Hypertension:  Permissive hypertension with SBP < 180 mmHg 48-72 hrs post cerebral ischemic event.  History of borderline hypertension.     4. Dyslipidemia ( on 10/9/20):  Continues on atorvastatin 80 mg nightly. 5. Hypothyroid (TSH 0.98 uIU/mL 20):  Continues on levothyroxine 75 mcg daily.        Advance Directive: Full Code   DVT prophylaxis with enoxaparin 40 mg sub-Q daily. Discharge planning:  Monday,  pending neurology evaluation      PA recommends Inpatient letter by Iraj Agrawal RN       Review Status Review Entered   In Primary 2021 15:31      Criteria Review   slr keep in     Name: Thomas Acosta   : 1970   CSN: [id number]   INSURANCE: [address] [place] [address]  -----------------------------------------------------------------------------  [facility] Physician Advisor Recommendation     Based on the clinical acuity, complexity, hospital course, data review and physician/consultant impressions and findings noted below it is our recommendation to keep patient in INPATIENT status.  ---------------------------------------------------------------------------------  Summary of impressions and findings:      Clinical summary - 44 y.o.  with rt side weakness, tongue numbness. Hx: malignant hypertension, labetalol IV given x1.  mri brain is negative, await Neuro consult input. IV fluids continuous. PT OT and ST. Vitals - /101  Labs and imaging - MRI no acute infarct  MCG criteria -   Comments - Keep patient as Inpatient status. Stroke management.     Chart reviewed and [facility] notified.     Colleen Hicks MD MPH  Physician [facility]. [email or web address]

## 2021-02-24 NOTE — TELEPHONE ENCOUNTER
None of those meds will cause diarrhea.  Can see how she is over weekend, it prince resolve otherwise can push hospital f/u out

## 2021-02-24 NOTE — TELEPHONE ENCOUNTER
Abiodun 45 Transitions Initial Follow Up Call    Outreach made within 2 business days of discharge: Yes    Patient: Jeanette Montague Patient : 1970   MRN: <[id number]>  Reason for Admission: There are no discharge diagnoses documented for the most recent discharge. Discharge Date: 21       Spoke with: [facility] department[facility]facility: [facility] Interactive Patient Contact:  Was patient able to fill all prescriptions: Yes  Was patient instructed to bring all medications to the follow-up visit:   Is patient taking all medications as directed in the discharge summary? Yes  Does patient understand their discharge instructions: Yes  Does patient have questions or concerns that need addressed prior to 7-14 day follow up office visit: yes - patient is having extreme diarrhea. Would like to hear from office. Please call.   [phone]    Scheduled appointment with PCP within 7-14 days    Follow Up  Future Appointments   Date Time Provider Anjali Barrios   3/3/2021 12:30 PM [address] [facility] [address]

## 2021-02-24 NOTE — TELEPHONE ENCOUNTER
Patient states she has had diarrhea consistently since her discharge on Monday. Stools are very watery and about every 15 minutes. She does not feel like she can even leave the house because of this. She was started on lipitor, plavix and aspirin and is not sure if it is one of these that are causing the diarrhea or not. She has been scheduled for a hospital follow up on 3/1/21 with .

## 2021-02-25 NOTE — PROGRESS NOTES
Physician Progress Note      PATIENT:               Mando Mcdonnell  CSN #:                  [id number]  :                       1970  ADMIT DATE:       2021 2:49 PM  Khari Holt DATE:        2021 5:24 PM  RESPONDING  PROVIDER #:        Jessica Acosta MD          QUERY TEXT:    Pt admitted with TIA. If possible, please document in progress notes and   discharge summary if you are evaluating and /or treating any of the following: The medical record reflects the following:  Risk Factors: TIA, uncontrolled HTN with non compliance of HTN medications,   daily OCP, Prediabetes  Clinical Indicators: - Neuro consult notes- \"Blood pressure was elevated above   824 systolic,  She has diagnosed with hypertension however she was not taking   medicine at home. She was on OCP before she was admitted to the hospital for   at least 2 years, Hypertension, not controlled, Hyperlipidemia, not   controlled, Remote smoker, Pre diabetes\"  Treatment: MRI, Consult stroke team, neurology consult,  Event monitor ordered    for afib, statin, Plavix,  Follow blood sugar at home and A1c with PCP,   Blood pressure monitor and consider starting blood pressure medication, DC OCP   and follow-up with GYN regarding HRT  Options provided:  -- Possible Embolic TIA POA  -- Pt had a TIA, not embolic  -- Other - I will add my own diagnosis  -- Disagree - Not applicable / Not valid  -- Disagree - Clinically unable to determine / Unknown  -- Refer to Clinical Documentation Reviewer    PROVIDER RESPONSE TEXT:    This patient had a TIA -not embolic TIA.     Query created by: Neila Holstein on 2021 12:07 PM      Electronically signed by:  Jessica Acosta MD 2021 12:13 PM

## 2021-03-01 ENCOUNTER — OFFICE VISIT (OUTPATIENT)
Dept: FAMILY MEDICINE CLINIC | Age: 51
End: 2021-03-01
Payer: COMMERCIAL

## 2021-03-01 VITALS
SYSTOLIC BLOOD PRESSURE: 130 MMHG | TEMPERATURE: 97.2 F | OXYGEN SATURATION: 98 % | DIASTOLIC BLOOD PRESSURE: 68 MMHG | HEART RATE: 81 BPM | BODY MASS INDEX: 30.22 KG/M2 | RESPIRATION RATE: 17 BRPM | WEIGHT: 164.2 LBS | HEIGHT: 62 IN

## 2021-03-01 DIAGNOSIS — G45.9 TIA (TRANSIENT ISCHEMIC ATTACK): Primary | ICD-10-CM

## 2021-03-01 DIAGNOSIS — E78.2 MIXED HYPERLIPIDEMIA: ICD-10-CM

## 2021-03-01 DIAGNOSIS — R73.03 PREDIABETES: ICD-10-CM

## 2021-03-01 PROCEDURE — 99214 OFFICE O/P EST MOD 30 MIN: CPT | Performed by: FAMILY MEDICINE

## 2021-03-01 SDOH — ECONOMIC STABILITY: INCOME INSECURITY: HOW HARD IS IT FOR YOU TO PAY FOR THE VERY BASICS LIKE FOOD, HOUSING, MEDICAL CARE, AND HEATING?: NOT HARD AT ALL

## 2021-03-01 SDOH — ECONOMIC STABILITY: TRANSPORTATION INSECURITY
IN THE PAST 12 MONTHS, HAS LACK OF TRANSPORTATION KEPT YOU FROM MEETINGS, WORK, OR FROM GETTING THINGS NEEDED FOR DAILY LIVING?: NO

## 2021-03-01 SDOH — ECONOMIC STABILITY: FOOD INSECURITY: WITHIN THE PAST 12 MONTHS, THE FOOD YOU BOUGHT JUST DIDN'T LAST AND YOU DIDN'T HAVE MONEY TO GET MORE.: NEVER TRUE

## 2021-03-01 ASSESSMENT — PATIENT HEALTH QUESTIONNAIRE - PHQ9
SUM OF ALL RESPONSES TO PHQ9 QUESTIONS 1 & 2: 0
1. LITTLE INTEREST OR PLEASURE IN DOING THINGS: 0
SUM OF ALL RESPONSES TO PHQ QUESTIONS 1-9: 0
2. FEELING DOWN, DEPRESSED OR HOPELESS: 0

## 2021-03-02 RX ORDER — ATORVASTATIN CALCIUM 80 MG/1
80 TABLET, FILM COATED ORAL NIGHTLY
Qty: 90 TABLET | Refills: 1 | Status: SHIPPED | OUTPATIENT
Start: 2021-03-02 | End: 2021-05-28 | Stop reason: SDUPTHER

## 2021-03-03 ENCOUNTER — HOSPITAL ENCOUNTER (OUTPATIENT)
Dept: WOMENS IMAGING | Age: 51
Discharge: HOME | End: 2021-03-03
Payer: COMMERCIAL

## 2021-03-03 DIAGNOSIS — Z12.31 ENCOUNTER FOR SCREENING MAMMOGRAM FOR MALIGNANT NEOPLASM OF BREAST: ICD-10-CM

## 2021-03-03 PROCEDURE — 77063 BREAST TOMOSYNTHESIS BI: CPT

## 2021-03-07 ASSESSMENT — ENCOUNTER SYMPTOMS
COLOR CHANGE: 0
NAUSEA: 0
WHEEZING: 0
ABDOMINAL PAIN: 0
CONSTIPATION: 0
SHORTNESS OF BREATH: 0
EYE DISCHARGE: 0
RHINORRHEA: 0
BLOOD IN STOOL: 0
EYE REDNESS: 0
VOMITING: 0
COUGH: 0
EYE PAIN: 0
CHEST TIGHTNESS: 0
DIARRHEA: 0
SINUS PRESSURE: 0

## 2021-03-07 NOTE — PATIENT INSTRUCTIONS

## 2021-03-07 NOTE — PROGRESS NOTES
Subjective:      Patient ID: Breanna Vidal is a 44 y.o. female. HPI  Chief Complaint   Patient presents with    Follow-Up from [facility]     Here for follow-up from hospital.  Was admitted for acute left-sided weakness. States started with her left arm feeling weak and then progressed. Advised her  she needed to go to the hospital.  Scan in the ED did not show any acute infarct. She was admitted and seen by neurology. During her hospital stay she regained full strength in both left upper and lower extremity. Further MRI testing showed no stroke. She was released on high-dose statin and aspirin Plavix therapy for the next 21 days. States that she had been on birth control until this event and now has stopped. Is not a smoker. Knows to work on diet and exercise changes  Hospital Medicine Discharge Summary     Patient ID: Breanna Vidal                 Patient's PCP: Diana Jeffries DO     Admit Date: 2/20/2021      Discharge Date:   02/22/21      Admitting Physician: Alf Quesada MD     Discharge Physician: Jessica Acosta MD      Discharge Diagnoses:            Active Hospital Problems     Diagnosis    TIA involving right internal carotid artery [G45.1]    Dyslipidemia [E78.5]    HTN (hypertension), benign [I10]    Family history of coronary artery disease [Z82.49]    RIND (reversible ischemic neurologic deficit), acute ([facility] [address] [I63.9]    Prediabetes [R73.03]    Mixed hyperlipidemia [E78.2]    Acquired hypothyroidism [E03.9]         The patient was seen and examined on day of discharge and this discharge summary is in conjunction with any daily progress note from day of discharge.     Hospital Course:      Presented with acute left-sided weakness on the upper and lower extremities.  It was transient.  Currently asymptomatic.  MRI without CVA. Evaluated by neurology.  Diagnosed as TIA  Being discharged with 21 days of ASA + Plavix, then ASA only  Event monitor ordered Statin started  Follow up with PCP  Stable and asymptomatic on the day of discharge    Jamel Long is a 44 y.o. female with the following history as recorded in [facility]:  Patient Active Problem List    Diagnosis Date Noted    TIA involving right internal carotid artery     Dyslipidemia     HTN (hypertension), benign 02/20/2021    Family history of coronary artery disease 02/20/2021    RIND (reversible ischemic neurologic deficit), acute ([address] 02/20/2021    Family hx of colon cancer 01/08/2019    Prediabetes 01/08/2019    Mixed hyperlipidemia 07/03/2018    Acquired hypothyroidism 11/18/2015     Current Outpatient Medications   Medication Sig Dispense Refill    atorvastatin (LIPITOR) 80 MG tablet Take 1 tablet by mouth nightly 90 tablet 1    aspirin 81 MG EC tablet Take 1 tablet by mouth daily 30 tablet 3    clopidogrel (PLAVIX) 75 MG tablet Take 1 tablet by mouth daily 21 tablet 0    SYNTHROID 75 MCG tablet TAKE ONE TABLET BY MOUTH DAILY 30 tablet 5    Levocetirizine Dihydrochloride (XYZAL ALLERGY 24HR PO) Take by mouth daily       No current facility-administered medications for this visit. Allergies: Patient has no known allergies.   Past Medical History:   Diagnosis Date    Decorative tattoo     Low back    Thyroid disease      Past Surgical History:   Procedure Laterality Date    CYST REMOVAL       Family History   Problem Relation Age of Onset    Diabetes Mother     Heart Disease Mother     Diabetes Father     Diabetes Paternal Aunt     Diabetes Paternal Uncle      Social History     Tobacco Use    Smoking status: Never Smoker    Smokeless tobacco: Never Used   Substance Use Topics    Alcohol use: Yes     Comment: justin     Vitals:    03/01/21 1130   BP: 130/68   Site: Left Upper Arm   Position: Sitting   Cuff Size: Medium Adult   Pulse: 81   Resp: 17   Temp: 97.2 °F (36.2 °C)   TempSrc: Temporal   SpO2: 98%   Weight: 164 lb 3.2 oz (74.5 kg)   Height: 5' 2\" (1.575 m) Body mass index is 30.03 kg/m².      Wt Readings from Last 3 Encounters:   03/01/21 164 lb 3.2 oz (74.5 kg)   02/21/21 165 lb 3.2 oz (74.9 kg)   10/09/20 166 lb 3.2 oz (75.4 kg)     BP Readings from Last 3 Encounters:   03/01/21 130/68   02/22/21 (!) 146/92   10/09/20 (!) 154/80      Lab Review   Admission on 02/20/2021, Discharged on 02/22/2021   Component Date Value    POC Glucose 02/20/2021 122*    Performed on 02/20/2021 ACCU-CHEK     Ventricular Rate 02/20/2021 66     Atrial Rate 02/20/2021 66     P-R Interval 02/20/2021 144     QRS Duration 02/20/2021 94     Q-T Interval 02/20/2021 410     QTc Calculation (Bazett) 02/20/2021 429     P Axis 02/20/2021 41     R Axis 02/20/2021 -11     T Axis 02/20/2021 11     Diagnosis 02/20/2021 Normal sinus rhythmNormal ECGWhen compared with ECG of 10-JARRET-2016 00:33,No significant change was foundConfirmed by Eleanor Felipe ([id number]) on 2/21/2021 1:58:49 PM     WBC 02/20/2021 5.8     RBC 02/20/2021 4.01     Hemoglobin 02/20/2021 13.6     Hematocrit 02/20/2021 39.9     MCV 02/20/2021 99.6     MCH 02/20/2021 33.9     MCHC 02/20/2021 34.0     RDW 02/20/2021 13.3     Platelets 71/69/0715 254     MPV 02/20/2021 8.5     Neutrophils % 02/20/2021 63.6     Lymphocytes % 02/20/2021 28.9     Monocytes % 02/20/2021 5.6     Eosinophils % 02/20/2021 1.4     Basophils % 02/20/2021 0.5     Neutrophils Absolute 02/20/2021 3.7     Lymphocytes Absolute 02/20/2021 1.7     Monocytes Absolute 02/20/2021 0.3     Eosinophils Absolute 02/20/2021 0.1     Basophils Absolute 02/20/2021 0.0     Sodium 02/20/2021 136     Potassium reflex Magnesi* 02/20/2021 3.8     Chloride 02/20/2021 104     CO2 02/20/2021 22     Anion Gap 02/20/2021 10     Glucose 02/20/2021 132*    BUN 02/20/2021 11     CREATININE 02/20/2021 0.9     GFR Non- 02/20/2021 >60     GFR  02/20/2021 >60     Calcium 02/20/2021 9.5     Total Protein 02/20/2021 7.7     Albumin 02/20/2021 4.7     Albumin/Globulin Ratio 02/20/2021 1.6     Total Bilirubin 02/20/2021 0.5     Alkaline Phosphatase 02/20/2021 85     ALT 02/20/2021 27     AST 02/20/2021 21     Globulin 02/20/2021 3.0     Troponin 02/20/2021 <0.01     Protime 02/20/2021 10.7     INR 02/20/2021 0.92     aPTT 02/20/2021 27.8     Troponin 02/20/2021 <0.01     Troponin 02/20/2021 <0.01     Sodium 02/21/2021 141     Potassium reflex Magnesi* 02/21/2021 4.3     Chloride 02/21/2021 107     CO2 02/21/2021 24     Anion Gap 02/21/2021 10     Glucose 02/21/2021 101*    BUN 02/21/2021 10     CREATININE 02/21/2021 0.9     GFR Non- 02/21/2021 >60     GFR  02/21/2021 >60     Calcium 02/21/2021 9.5     Total Protein 02/21/2021 6.7     Albumin 02/21/2021 4.2     Albumin/Globulin Ratio 02/21/2021 1.7     Total Bilirubin 02/21/2021 0.7     Alkaline Phosphatase 02/21/2021 73     ALT 02/21/2021 23     AST 02/21/2021 17     Globulin 02/21/2021 2.5     WBC 02/21/2021 8.9     RBC 02/21/2021 3.69*    Hemoglobin 02/21/2021 12.4     Hematocrit 02/21/2021 37.0     MCV 02/21/2021 100.3*    MCH 02/21/2021 33.5     MCHC 02/21/2021 33.4     RDW 02/21/2021 13.3     Platelets 73/57/8458 249     MPV 02/21/2021 9.3     Neutrophils % 02/21/2021 64.5     Lymphocytes % 02/21/2021 29.3     Monocytes % 02/21/2021 4.7     Eosinophils % 02/21/2021 1.2     Basophils % 02/21/2021 0.3     Neutrophils Absolute 02/21/2021 5.7     Lymphocytes Absolute 02/21/2021 2.6     Monocytes Absolute 02/21/2021 0.4     Eosinophils Absolute 02/21/2021 0.1     Basophils Absolute 02/21/2021 0.0     Hemoglobin A1C 02/21/2021 5.6     eAG 02/21/2021 114.0     Cholesterol, Total 02/21/2021 251*    Triglycerides 02/21/2021 177*    HDL 02/21/2021 40     LDL Calculated 02/21/2021 176*    VLDL Cholesterol Calcula* 02/21/2021 35     Pro-BNP 02/20/2021 85     Hemoglobin A1C 02/20/2021 5.8     eAG 02/20/2021 119.8     POC Glucose 02/20/2021 127*    Performed on 02/20/2021 ACCU-CHEK     POC Glucose 02/21/2021 104*    Performed on 02/21/2021 ACCU-CHEK     POC Glucose 02/21/2021 121*    Performed on 02/21/2021 ACCU-CHEK     POC Glucose 02/21/2021 104*    Performed on 02/21/2021 ACCU-CHEK     POC Glucose 02/21/2021 97     Performed on 02/21/2021 ACCU-CHEK     POC Glucose 02/22/2021 99     Performed on 02/22/2021 ACCU-CHEK     Performed on 02/22/2021 ACCU-CHEK     POC Glucose 02/22/2021 136*    Performed on 02/22/2021 ACCU-CHEK        Review of Systems   Constitutional: Negative for chills, fatigue, fever and unexpected weight change. HENT: Negative for ear discharge, ear pain, hearing loss, rhinorrhea, sinus pressure and tinnitus. Eyes: Negative for pain, discharge, redness and visual disturbance. Respiratory: Negative for cough, chest tightness, shortness of breath and wheezing. Cardiovascular: Negative for chest pain and palpitations. Gastrointestinal: Negative for abdominal pain, blood in stool, constipation, diarrhea, nausea and vomiting. Genitourinary: Negative for difficulty urinating, dysuria and hematuria. Musculoskeletal: Negative for arthralgias and joint swelling. Skin: Negative for color change and rash. Neurological: Negative for dizziness, seizures, syncope and headaches. Hematological: Negative for adenopathy. Does not bruise/bleed easily. Psychiatric/Behavioral: Negative for dysphoric mood and sleep disturbance. The patient is not nervous/anxious. Objective:   Physical Exam  Constitutional:       General: She is not in acute distress. Appearance: Normal appearance. She is well-developed. She is not ill-appearing. HENT:      Head: Normocephalic. Right Ear: Tympanic membrane, ear canal and external ear normal.      Left Ear: Tympanic membrane and ear canal normal.      Nose: Nose normal.      Mouth/Throat:      Pharynx: No oropharyngeal exudate.    Eyes: (LIPITOR) 80 MG tablet     Sig: Take 1 tablet by mouth nightly     Dispense:  90 tablet     Refill:  1     rto 2 m  Time spent- 30 min        New FLORES DO

## 2021-03-29 ENCOUNTER — NURSE ONLY (OUTPATIENT)
Dept: FAMILY MEDICINE CLINIC | Age: 51
End: 2021-03-29
Payer: COMMERCIAL

## 2021-03-29 DIAGNOSIS — E78.2 MIXED HYPERLIPIDEMIA: ICD-10-CM

## 2021-03-29 PROCEDURE — 36415 COLL VENOUS BLD VENIPUNCTURE: CPT | Performed by: FAMILY MEDICINE

## 2021-03-30 LAB
ALBUMIN SERPL-MCNC: 4.6 G/DL (ref 3.4–5)
ALP BLD-CCNC: 117 U/L (ref 40–129)
ALT SERPL-CCNC: 68 U/L (ref 10–40)
AST SERPL-CCNC: 36 U/L (ref 15–37)
BILIRUB SERPL-MCNC: 0.7 MG/DL (ref 0–1)
BILIRUBIN DIRECT: <0.2 MG/DL (ref 0–0.3)
BILIRUBIN, INDIRECT: ABNORMAL MG/DL (ref 0–1)
TOTAL PROTEIN: 7.1 G/DL (ref 6.4–8.2)

## 2021-04-13 ENCOUNTER — APPOINTMENT (OUTPATIENT)
Dept: GENERAL RADIOLOGY | Age: 51
End: 2021-04-13
Payer: COMMERCIAL

## 2021-04-13 ENCOUNTER — HOSPITAL ENCOUNTER (OUTPATIENT)
Age: 51
Setting detail: OBSERVATION
Discharge: HOME | End: 2021-04-15
Attending: EMERGENCY MEDICINE | Admitting: INTERNAL MEDICINE
Payer: COMMERCIAL

## 2021-04-13 DIAGNOSIS — I16.1 HYPERTENSIVE EMERGENCY: ICD-10-CM

## 2021-04-13 DIAGNOSIS — R07.9 CHEST PAIN, UNSPECIFIED TYPE: Primary | ICD-10-CM

## 2021-04-13 LAB
BASOPHILS ABSOLUTE: 0.1 K/UL (ref 0–0.2)
BASOPHILS RELATIVE PERCENT: 0.7 %
EOSINOPHILS ABSOLUTE: 0 K/UL (ref 0–0.6)
EOSINOPHILS RELATIVE PERCENT: 0.4 %
HCT VFR BLD CALC: 41 % (ref 36–48)
HEMOGLOBIN: 13.8 G/DL (ref 12–16)
LYMPHOCYTES ABSOLUTE: 2.3 K/UL (ref 1–5.1)
LYMPHOCYTES RELATIVE PERCENT: 27.4 %
MCH RBC QN AUTO: 33.3 PG (ref 26–34)
MCHC RBC AUTO-ENTMCNC: 33.6 G/DL (ref 31–36)
MCV RBC AUTO: 99.2 FL (ref 80–100)
MONOCYTES ABSOLUTE: 0.5 K/UL (ref 0–1.3)
MONOCYTES RELATIVE PERCENT: 6 %
NEUTROPHILS ABSOLUTE: 5.4 K/UL (ref 1.7–7.7)
NEUTROPHILS RELATIVE PERCENT: 65.5 %
PDW BLD-RTO: 13.2 % (ref 12.4–15.4)
PLATELET # BLD: 273 K/UL (ref 135–450)
PMV BLD AUTO: 8.2 FL (ref 5–10.5)
RBC # BLD: 4.13 M/UL (ref 4–5.2)
WBC # BLD: 8.3 K/UL (ref 4–11)

## 2021-04-13 PROCEDURE — 71045 X-RAY EXAM CHEST 1 VIEW: CPT

## 2021-04-13 PROCEDURE — 99284 EMERGENCY DEPT VISIT MOD MDM: CPT

## 2021-04-13 PROCEDURE — 96374 THER/PROPH/DIAG INJ IV PUSH: CPT

## 2021-04-13 PROCEDURE — 6370000000 HC RX 637 (ALT 250 FOR IP): Performed by: EMERGENCY MEDICINE

## 2021-04-13 PROCEDURE — 6360000002 HC RX W HCPCS: Performed by: EMERGENCY MEDICINE

## 2021-04-13 PROCEDURE — 93005 ELECTROCARDIOGRAM TRACING: CPT | Performed by: EMERGENCY MEDICINE

## 2021-04-13 PROCEDURE — 84484 ASSAY OF TROPONIN QUANT: CPT

## 2021-04-13 PROCEDURE — 85025 COMPLETE CBC W/AUTO DIFF WBC: CPT

## 2021-04-13 PROCEDURE — 96375 TX/PRO/DX INJ NEW DRUG ADDON: CPT

## 2021-04-13 PROCEDURE — 80053 COMPREHEN METABOLIC PANEL: CPT

## 2021-04-13 RX ORDER — ASPIRIN 325 MG
325 TABLET ORAL ONCE
Status: COMPLETED | OUTPATIENT
Start: 2021-04-13 | End: 2021-04-13

## 2021-04-13 RX ORDER — HYDRALAZINE HYDROCHLORIDE 20 MG/ML
10 INJECTION INTRAMUSCULAR; INTRAVENOUS ONCE
Status: COMPLETED | OUTPATIENT
Start: 2021-04-13 | End: 2021-04-13

## 2021-04-13 RX ADMIN — ASPIRIN 325 MG: 325 TABLET ORAL at 23:51

## 2021-04-13 RX ADMIN — HYDRALAZINE HYDROCHLORIDE 10 MG: 20 INJECTION INTRAMUSCULAR; INTRAVENOUS at 23:48

## 2021-04-13 RX ADMIN — NITROGLYCERIN 0.5 INCH: 20 OINTMENT TOPICAL at 23:48

## 2021-04-13 ASSESSMENT — PAIN DESCRIPTION - LOCATION: LOCATION: CHEST

## 2021-04-13 ASSESSMENT — PAIN SCALES - GENERAL: PAINLEVEL_OUTOF10: 5

## 2021-04-14 ENCOUNTER — APPOINTMENT (OUTPATIENT)
Dept: CARDIAC CATH/INVASIVE PROCEDURES | Age: 51
End: 2021-04-14
Payer: COMMERCIAL

## 2021-04-14 PROBLEM — R07.9 CHEST PAIN: Status: ACTIVE | Noted: 2021-04-14

## 2021-04-14 PROBLEM — E66.9 OBESITY: Status: ACTIVE | Noted: 2021-04-14

## 2021-04-14 LAB
A/G RATIO: 1.7 (ref 1.1–2.2)
ALBUMIN SERPL-MCNC: 4.5 G/DL (ref 3.4–5)
ALBUMIN SERPL-MCNC: 5 G/DL (ref 3.4–5)
ALP BLD-CCNC: 106 U/L (ref 40–129)
ALP BLD-CCNC: 137 U/L (ref 40–129)
ALT SERPL-CCNC: 52 U/L (ref 10–40)
ALT SERPL-CCNC: 61 U/L (ref 10–40)
ANION GAP SERPL CALCULATED.3IONS-SCNC: 12 MMOL/L (ref 3–16)
ANION GAP SERPL CALCULATED.3IONS-SCNC: 15 MMOL/L (ref 3–16)
APTT: 169 SEC (ref 24.2–36.2)
APTT: 64.4 SEC (ref 24.2–36.2)
AST SERPL-CCNC: 43 U/L (ref 15–37)
AST SERPL-CCNC: 45 U/L (ref 15–37)
BILIRUB SERPL-MCNC: 0.7 MG/DL (ref 0–1)
BILIRUB SERPL-MCNC: 0.7 MG/DL (ref 0–1)
BILIRUBIN DIRECT: <0.2 MG/DL (ref 0–0.3)
BILIRUBIN, INDIRECT: ABNORMAL MG/DL (ref 0–1)
BUN BLDV-MCNC: 10 MG/DL (ref 7–20)
BUN BLDV-MCNC: 10 MG/DL (ref 7–20)
CALCIUM SERPL-MCNC: 10.2 MG/DL (ref 8.3–10.6)
CALCIUM SERPL-MCNC: 10.5 MG/DL (ref 8.3–10.6)
CHLORIDE BLD-SCNC: 104 MMOL/L (ref 99–110)
CHLORIDE BLD-SCNC: 104 MMOL/L (ref 99–110)
CHOLESTEROL, TOTAL: 138 MG/DL (ref 0–199)
CO2: 22 MMOL/L (ref 21–32)
CO2: 25 MMOL/L (ref 21–32)
CREAT SERPL-MCNC: 0.8 MG/DL (ref 0.6–1.1)
CREAT SERPL-MCNC: 0.8 MG/DL (ref 0.6–1.1)
EKG ATRIAL RATE: 61 BPM
EKG ATRIAL RATE: 74 BPM
EKG DIAGNOSIS: NORMAL
EKG DIAGNOSIS: NORMAL
EKG P AXIS: 46 DEGREES
EKG P AXIS: 52 DEGREES
EKG P-R INTERVAL: 126 MS
EKG P-R INTERVAL: 148 MS
EKG Q-T INTERVAL: 410 MS
EKG Q-T INTERVAL: 422 MS
EKG QRS DURATION: 90 MS
EKG QRS DURATION: 94 MS
EKG QTC CALCULATION (BAZETT): 424 MS
EKG QTC CALCULATION (BAZETT): 455 MS
EKG R AXIS: -16 DEGREES
EKG R AXIS: -9 DEGREES
EKG T AXIS: 18 DEGREES
EKG T AXIS: 9 DEGREES
EKG VENTRICULAR RATE: 61 BPM
EKG VENTRICULAR RATE: 74 BPM
GFR AFRICAN AMERICAN: >60
GFR AFRICAN AMERICAN: >60
GFR NON-AFRICAN AMERICAN: >60
GFR NON-AFRICAN AMERICAN: >60
GLOBULIN: 3 G/DL
GLUCOSE BLD-MCNC: 112 MG/DL (ref 70–99)
GLUCOSE BLD-MCNC: 122 MG/DL (ref 70–99)
HCG QUALITATIVE: NEGATIVE
HCT VFR BLD CALC: 37 % (ref 36–48)
HDLC SERPL-MCNC: 53 MG/DL (ref 40–60)
HEMOGLOBIN: 12.5 G/DL (ref 12–16)
LDL CHOLESTEROL CALCULATED: 71 MG/DL
LV EF: 60 %
LVEF MODALITY: NORMAL
MCH RBC QN AUTO: 33.1 PG (ref 26–34)
MCHC RBC AUTO-ENTMCNC: 33.8 G/DL (ref 31–36)
MCV RBC AUTO: 98.1 FL (ref 80–100)
PDW BLD-RTO: 13.2 % (ref 12.4–15.4)
PLATELET # BLD: 252 K/UL (ref 135–450)
PMV BLD AUTO: 8.5 FL (ref 5–10.5)
POTASSIUM REFLEX MAGNESIUM: 3.9 MMOL/L (ref 3.5–5.1)
POTASSIUM SERPL-SCNC: 4.1 MMOL/L (ref 3.5–5.1)
RBC # BLD: 3.77 M/UL (ref 4–5.2)
REASON FOR REJECTION: NORMAL
REJECTED TEST: NORMAL
SODIUM BLD-SCNC: 141 MMOL/L (ref 136–145)
SODIUM BLD-SCNC: 141 MMOL/L (ref 136–145)
TOTAL PROTEIN: 7.2 G/DL (ref 6.4–8.2)
TOTAL PROTEIN: 8 G/DL (ref 6.4–8.2)
TRIGL SERPL-MCNC: 70 MG/DL (ref 0–150)
TROPONIN: 0.11 NG/ML
TROPONIN: 0.24 NG/ML
TROPONIN: <0.01 NG/ML
VLDLC SERPL CALC-MCNC: 14 MG/DL
WBC # BLD: 8.6 K/UL (ref 4–11)

## 2021-04-14 PROCEDURE — C1894 INTRO/SHEATH, NON-LASER: HCPCS

## 2021-04-14 PROCEDURE — 99245 OFF/OP CONSLTJ NEW/EST HI 55: CPT | Performed by: INTERNAL MEDICINE

## 2021-04-14 PROCEDURE — 2580000003 HC RX 258: Performed by: NURSE PRACTITIONER

## 2021-04-14 PROCEDURE — C1887 CATHETER, GUIDING: HCPCS

## 2021-04-14 PROCEDURE — 6370000000 HC RX 637 (ALT 250 FOR IP): Performed by: NURSE PRACTITIONER

## 2021-04-14 PROCEDURE — 6360000004 HC RX CONTRAST MEDICATION: Performed by: INTERNAL MEDICINE

## 2021-04-14 PROCEDURE — 93005 ELECTROCARDIOGRAM TRACING: CPT | Performed by: INTERNAL MEDICINE

## 2021-04-14 PROCEDURE — 80076 HEPATIC FUNCTION PANEL: CPT

## 2021-04-14 PROCEDURE — 6370000000 HC RX 637 (ALT 250 FOR IP): Performed by: INTERNAL MEDICINE

## 2021-04-14 PROCEDURE — 6360000002 HC RX W HCPCS

## 2021-04-14 PROCEDURE — 92978 ENDOLUMINL IVUS OCT C 1ST: CPT | Performed by: INTERNAL MEDICINE

## 2021-04-14 PROCEDURE — 93571 IV DOP VEL&/PRESS C FLO 1ST: CPT

## 2021-04-14 PROCEDURE — 2709999900 HC NON-CHARGEABLE SUPPLY

## 2021-04-14 PROCEDURE — 2500000003 HC RX 250 WO HCPCS

## 2021-04-14 PROCEDURE — 6360000004 HC RX CONTRAST MEDICATION

## 2021-04-14 PROCEDURE — 85027 COMPLETE CBC AUTOMATED: CPT

## 2021-04-14 PROCEDURE — 85347 COAGULATION TIME ACTIVATED: CPT

## 2021-04-14 PROCEDURE — 36415 COLL VENOUS BLD VENIPUNCTURE: CPT

## 2021-04-14 PROCEDURE — 84484 ASSAY OF TROPONIN QUANT: CPT

## 2021-04-14 PROCEDURE — 92978 ENDOLUMINL IVUS OCT C 1ST: CPT

## 2021-04-14 PROCEDURE — 6360000002 HC RX W HCPCS: Performed by: INTERNAL MEDICINE

## 2021-04-14 PROCEDURE — 80048 BASIC METABOLIC PNL TOTAL CA: CPT

## 2021-04-14 PROCEDURE — 93458 L HRT ARTERY/VENTRICLE ANGIO: CPT

## 2021-04-14 PROCEDURE — G0378 HOSPITAL OBSERVATION PER HR: HCPCS

## 2021-04-14 PROCEDURE — C8923 2D TTE W OR W/O FOL W/CON,CO: HCPCS

## 2021-04-14 PROCEDURE — 85730 THROMBOPLASTIN TIME PARTIAL: CPT

## 2021-04-14 PROCEDURE — 96365 THER/PROPH/DIAG IV INF INIT: CPT

## 2021-04-14 PROCEDURE — C1769 GUIDE WIRE: HCPCS

## 2021-04-14 PROCEDURE — 6370000000 HC RX 637 (ALT 250 FOR IP)

## 2021-04-14 PROCEDURE — 84703 CHORIONIC GONADOTROPIN ASSAY: CPT

## 2021-04-14 PROCEDURE — 93571 IV DOP VEL&/PRESS C FLO 1ST: CPT | Performed by: INTERNAL MEDICINE

## 2021-04-14 PROCEDURE — 93458 L HRT ARTERY/VENTRICLE ANGIO: CPT | Performed by: INTERNAL MEDICINE

## 2021-04-14 PROCEDURE — C1753 CATH, INTRAVAS ULTRASOUND: HCPCS

## 2021-04-14 PROCEDURE — 2580000003 HC RX 258: Performed by: INTERNAL MEDICINE

## 2021-04-14 PROCEDURE — 2580000003 HC RX 258

## 2021-04-14 PROCEDURE — 80061 LIPID PANEL: CPT

## 2021-04-14 PROCEDURE — 96366 THER/PROPH/DIAG IV INF ADDON: CPT

## 2021-04-14 RX ORDER — LISINOPRIL 5 MG/1
5 TABLET ORAL DAILY
Status: DISCONTINUED | OUTPATIENT
Start: 2021-04-14 | End: 2021-04-15 | Stop reason: HOSPADM

## 2021-04-14 RX ORDER — SODIUM CHLORIDE 0.9 % (FLUSH) 0.9 %
5-40 SYRINGE (ML) INJECTION PRN
Status: DISCONTINUED | OUTPATIENT
Start: 2021-04-14 | End: 2021-04-15 | Stop reason: HOSPADM

## 2021-04-14 RX ORDER — SODIUM CHLORIDE 9 MG/ML
25 INJECTION, SOLUTION INTRAVENOUS PRN
Status: DISCONTINUED | OUTPATIENT
Start: 2021-04-14 | End: 2021-04-15 | Stop reason: HOSPADM

## 2021-04-14 RX ORDER — FENTANYL CITRATE 50 UG/ML
INJECTION, SOLUTION INTRAMUSCULAR; INTRAVENOUS
Status: COMPLETED | OUTPATIENT
Start: 2021-04-14 | End: 2021-04-14

## 2021-04-14 RX ORDER — ACETAMINOPHEN 325 MG/1
650 TABLET ORAL EVERY 6 HOURS PRN
Status: DISCONTINUED | OUTPATIENT
Start: 2021-04-14 | End: 2021-04-15 | Stop reason: HOSPADM

## 2021-04-14 RX ORDER — HEPARIN SODIUM 1000 [USP'U]/ML
1900 INJECTION, SOLUTION INTRAVENOUS; SUBCUTANEOUS PRN
Status: DISCONTINUED | OUTPATIENT
Start: 2021-04-14 | End: 2021-04-14

## 2021-04-14 RX ORDER — MIDAZOLAM HYDROCHLORIDE 5 MG/ML
INJECTION INTRAMUSCULAR; INTRAVENOUS
Status: COMPLETED | OUTPATIENT
Start: 2021-04-14 | End: 2021-04-14

## 2021-04-14 RX ORDER — PROMETHAZINE HYDROCHLORIDE 25 MG/1
12.5 TABLET ORAL EVERY 6 HOURS PRN
Status: DISCONTINUED | OUTPATIENT
Start: 2021-04-14 | End: 2021-04-15 | Stop reason: HOSPADM

## 2021-04-14 RX ORDER — ACETAMINOPHEN 650 MG/1
650 SUPPOSITORY RECTAL EVERY 6 HOURS PRN
Status: DISCONTINUED | OUTPATIENT
Start: 2021-04-14 | End: 2021-04-15 | Stop reason: HOSPADM

## 2021-04-14 RX ORDER — LISINOPRIL 5 MG/1
5 TABLET ORAL DAILY
Qty: 30 TABLET | Refills: 0 | Status: CANCELLED | OUTPATIENT
Start: 2021-04-15

## 2021-04-14 RX ORDER — ASPIRIN 81 MG/1
81 TABLET ORAL DAILY
Status: DISCONTINUED | OUTPATIENT
Start: 2021-04-14 | End: 2021-04-15 | Stop reason: HOSPADM

## 2021-04-14 RX ORDER — POLYETHYLENE GLYCOL 3350 17 G/17G
17 POWDER, FOR SOLUTION ORAL DAILY PRN
Status: DISCONTINUED | OUTPATIENT
Start: 2021-04-14 | End: 2021-04-15 | Stop reason: HOSPADM

## 2021-04-14 RX ORDER — ATORVASTATIN CALCIUM 40 MG/1
40 TABLET, FILM COATED ORAL NIGHTLY
Status: DISCONTINUED | OUTPATIENT
Start: 2021-04-14 | End: 2021-04-14 | Stop reason: SDUPTHER

## 2021-04-14 RX ORDER — HYDRALAZINE HYDROCHLORIDE 20 MG/ML
10 INJECTION INTRAMUSCULAR; INTRAVENOUS EVERY 6 HOURS PRN
Status: DISCONTINUED | OUTPATIENT
Start: 2021-04-14 | End: 2021-04-15 | Stop reason: HOSPADM

## 2021-04-14 RX ORDER — HEPARIN SODIUM 10000 [USP'U]/100ML
3.7 INJECTION, SOLUTION INTRAVENOUS CONTINUOUS
Status: DISCONTINUED | OUTPATIENT
Start: 2021-04-14 | End: 2021-04-14

## 2021-04-14 RX ORDER — ATORVASTATIN CALCIUM 80 MG/1
80 TABLET, FILM COATED ORAL NIGHTLY
Status: DISCONTINUED | OUTPATIENT
Start: 2021-04-14 | End: 2021-04-15 | Stop reason: HOSPADM

## 2021-04-14 RX ORDER — ACETAMINOPHEN 325 MG/1
650 TABLET ORAL EVERY 4 HOURS PRN
Status: DISCONTINUED | OUTPATIENT
Start: 2021-04-14 | End: 2021-04-15 | Stop reason: HOSPADM

## 2021-04-14 RX ORDER — HEPARIN SODIUM 1000 [USP'U]/ML
INJECTION, SOLUTION INTRAVENOUS; SUBCUTANEOUS
Status: COMPLETED | OUTPATIENT
Start: 2021-04-14 | End: 2021-04-14

## 2021-04-14 RX ORDER — SODIUM CHLORIDE 0.9 % (FLUSH) 0.9 %
5-40 SYRINGE (ML) INJECTION EVERY 12 HOURS SCHEDULED
Status: DISCONTINUED | OUTPATIENT
Start: 2021-04-14 | End: 2021-04-15 | Stop reason: HOSPADM

## 2021-04-14 RX ORDER — ONDANSETRON 2 MG/ML
4 INJECTION INTRAMUSCULAR; INTRAVENOUS EVERY 6 HOURS PRN
Status: DISCONTINUED | OUTPATIENT
Start: 2021-04-14 | End: 2021-04-15 | Stop reason: HOSPADM

## 2021-04-14 RX ORDER — SODIUM CHLORIDE 9 MG/ML
INJECTION, SOLUTION INTRAVENOUS CONTINUOUS
Status: DISCONTINUED | OUTPATIENT
Start: 2021-04-14 | End: 2021-04-15

## 2021-04-14 RX ORDER — HEPARIN SODIUM 1000 [USP'U]/ML
3700 INJECTION, SOLUTION INTRAVENOUS; SUBCUTANEOUS PRN
Status: DISCONTINUED | OUTPATIENT
Start: 2021-04-14 | End: 2021-04-14

## 2021-04-14 RX ORDER — SODIUM CHLORIDE 9 MG/ML
INJECTION, SOLUTION INTRAVENOUS CONTINUOUS
Status: ACTIVE | OUTPATIENT
Start: 2021-04-14 | End: 2021-04-14

## 2021-04-14 RX ORDER — HEPARIN SODIUM 1000 [USP'U]/ML
3700 INJECTION, SOLUTION INTRAVENOUS; SUBCUTANEOUS ONCE
Status: COMPLETED | OUTPATIENT
Start: 2021-04-14 | End: 2021-04-14

## 2021-04-14 RX ADMIN — HEPARIN SODIUM 5000 UNITS: 1000 INJECTION, SOLUTION INTRAVENOUS; SUBCUTANEOUS at 13:41

## 2021-04-14 RX ADMIN — SODIUM CHLORIDE, PRESERVATIVE FREE 10 ML: 5 INJECTION INTRAVENOUS at 20:59

## 2021-04-14 RX ADMIN — METOPROLOL TARTRATE 25 MG: 25 TABLET, FILM COATED ORAL at 21:02

## 2021-04-14 RX ADMIN — TICAGRELOR 90 MG: 90 TABLET ORAL at 20:58

## 2021-04-14 RX ADMIN — FENTANYL CITRATE 25 MCG: 50 INJECTION, SOLUTION INTRAMUSCULAR; INTRAVENOUS at 13:57

## 2021-04-14 RX ADMIN — ATORVASTATIN CALCIUM 80 MG: 80 TABLET, FILM COATED ORAL at 20:58

## 2021-04-14 RX ADMIN — FENTANYL CITRATE 50 MCG: 50 INJECTION, SOLUTION INTRAMUSCULAR; INTRAVENOUS at 13:50

## 2021-04-14 RX ADMIN — LEVOTHYROXINE SODIUM 75 MCG: 0.03 TABLET ORAL at 05:57

## 2021-04-14 RX ADMIN — MIDAZOLAM HYDROCHLORIDE 2 MG: 5 INJECTION INTRAMUSCULAR; INTRAVENOUS at 13:38

## 2021-04-14 RX ADMIN — HEPARIN SODIUM 3.7 ML/HR: 10000 INJECTION, SOLUTION INTRAVENOUS at 10:44

## 2021-04-14 RX ADMIN — ACETAMINOPHEN 650 MG: 325 TABLET ORAL at 10:13

## 2021-04-14 RX ADMIN — ASPIRIN 81 MG: 81 TABLET, COATED ORAL at 10:14

## 2021-04-14 RX ADMIN — FENTANYL CITRATE 50 MCG: 50 INJECTION, SOLUTION INTRAMUSCULAR; INTRAVENOUS at 14:22

## 2021-04-14 RX ADMIN — HEPARIN SODIUM 3700 UNITS: 1000 INJECTION INTRAVENOUS; SUBCUTANEOUS at 07:22

## 2021-04-14 RX ADMIN — NITROGLYCERIN 1 INCH: 20 OINTMENT TOPICAL at 11:41

## 2021-04-14 RX ADMIN — SODIUM CHLORIDE: 9 INJECTION, SOLUTION INTRAVENOUS at 02:25

## 2021-04-14 RX ADMIN — SODIUM CHLORIDE: 9 INJECTION, SOLUTION INTRAVENOUS at 11:06

## 2021-04-14 RX ADMIN — ACETAMINOPHEN 650 MG: 325 TABLET ORAL at 03:02

## 2021-04-14 RX ADMIN — NITROGLYCERIN 1 INCH: 20 OINTMENT TOPICAL at 05:54

## 2021-04-14 RX ADMIN — LISINOPRIL 5 MG: 5 TABLET ORAL at 10:14

## 2021-04-14 RX ADMIN — FENTANYL CITRATE 50 MCG: 50 INJECTION, SOLUTION INTRAMUSCULAR; INTRAVENOUS at 13:38

## 2021-04-14 RX ADMIN — HEPARIN SODIUM 7.4 ML/HR: 10000 INJECTION, SOLUTION INTRAVENOUS at 07:32

## 2021-04-14 RX ADMIN — PERFLUTREN 1.65 MG: 6.52 INJECTION, SUSPENSION INTRAVENOUS at 10:15

## 2021-04-14 RX ADMIN — MIDAZOLAM HYDROCHLORIDE 1 MG: 5 INJECTION INTRAMUSCULAR; INTRAVENOUS at 14:26

## 2021-04-14 RX ADMIN — MIDAZOLAM HYDROCHLORIDE 1 MG: 5 INJECTION INTRAMUSCULAR; INTRAVENOUS at 13:57

## 2021-04-14 ASSESSMENT — PAIN SCALES - GENERAL
PAINLEVEL_OUTOF10: 0
PAINLEVEL_OUTOF10: 6
PAINLEVEL_OUTOF10: 0
PAINLEVEL_OUTOF10: 1
PAINLEVEL_OUTOF10: 0
PAINLEVEL_OUTOF10: 0
PAINLEVEL_OUTOF10: 5
PAINLEVEL_OUTOF10: 0
PAINLEVEL_OUTOF10: 3

## 2021-04-14 NOTE — CONSULTS
[address] [place] [address]  [phone]      Attending Physician: Judith Luo MD  Reason for Consultation/Chief Complaint: Chest pain    Subjective   History of Present Illness:  Padilla Medina is a 44 y.o. patient who presented to the hospital with complaints of chest pain, she describes a substernal burning, this started 2 days ago, she was on a walk in the evening when she began to feel a substernal chest burning that radiated to her left arm. She denied associated shortness of breath, nausea, vomiting or sweating. She says symptoms worsen to the course of yesterday and she decided come to the emergency room, was admitted to hospital due to elevated troponin with concerns for non-STEMI. Troponin levels were 0.11 then 0.24. She is been treated with heparin drip as well as nitroglycerin, she says symptoms have improved but she continues to have intermittent chest pain. She is status post recent hospital admission in February 2021 with left-sided weakness and concern for TIA. She had a neurology work-up which included an MRI which was negative      Chronically, she does have hypertension, she denies diabetes, she says she has hypothyroidism, she says these conditions have been stable over the last 6 months and her only medicine change has been a cholesterol medication. She denies any prior cardiac history. Past Medical History:   has a past medical history of Decorative tattoo and Thyroid disease. Surgical History:   has a past surgical history that includes cyst removal.     Social History:   reports that she has never smoked. She has never used smokeless tobacco. She reports current alcohol use. She reports that she does not use drugs. Family History:  family history includes Diabetes in her father, mother, paternal aunt, and paternal uncle; Heart Disease in her mother.       Home Medications:  Were reviewed and are listed in nursing record and/or below  Prior to Admission medications    Medication Sig Start Date End Date Taking? Authorizing Provider   atorvastatin (LIPITOR) 80 MG tablet Take 1 tablet by mouth nightly 3/2/21   Concha Wilson, DO   aspirin 81 MG EC tablet Take 1 tablet by mouth daily 2/23/21   Aisha Prader, MD   SYNTHROID 75 MCG tablet TAKE ONE TABLET BY MOUTH DAILY 1/13/21   Concha Hoopert, DO   Levocetirizine Dihydrochloride (XYZAL ALLERGY 24HR PO) Take by mouth daily    Historical Provider, MD        CURRENT Medications:  aspirin EC tablet 81 mg, Daily  atorvastatin (LIPITOR) tablet 80 mg, Nightly  levothyroxine (SYNTHROID) tablet 75 mcg, Daily  sodium chloride flush 0.9 % injection 5-40 mL, 2 times per day  sodium chloride flush 0.9 % injection 5-40 mL, PRN  0.9 % sodium chloride infusion, PRN  promethazine (PHENERGAN) tablet 12.5 mg, Q6H PRN    Or  ondansetron (ZOFRAN) injection 4 mg, Q6H PRN  acetaminophen (TYLENOL) tablet 650 mg, Q6H PRN    Or  acetaminophen (TYLENOL) suppository 650 mg, Q6H PRN  polyethylene glycol (GLYCOLAX) packet 17 g, Daily PRN  0.9 % sodium chloride infusion, Continuous  nitroglycerin (NITRO-BID) 2 % ointment 1 inch, 4 times per day  lisinopril (PRINIVIL;ZESTRIL) tablet 5 mg, Daily  hydrALAZINE (APRESOLINE) injection 10 mg, Q6H PRN  heparin (porcine) injection 3,700 Units, PRN  heparin (porcine) injection 1,900 Units, PRN  heparin 25,000 units in dextrose 5% 250 mL (premix) infusion, Continuous  perflutren lipid microspheres (DEFINITY) injection 1.65 mg, ONCE PRN        Allergies:  Patient has no known allergies. Review of Systems:   A 14 point review of symptoms completed. Pertinent positives identified in the HPI, all other review of symptoms negative as below.       Objective   PHYSICAL EXAM:    Vitals:    04/14/21 0845   BP: 137/84   Pulse: 70   Resp: 16   Temp: 97.4 °F (36.3 °C)   SpO2: 97%    Weight: 171 lb 15.3 oz (78 kg)         General Appearance:  Alert, cooperative, no distress, appears stated age   Head: Normocephalic, without obvious abnormality, atraumatic   Eyes:  PERRL, conjunctiva/corneas clear   Nose: Nares normal, no drainage or sinus tenderness   Throat: Lips, mucosa, and tongue normal   Neck: Supple, symmetrical, trachea midline, no adenopathy, thyroid: not enlarged, symmetric, no tenderness/mass/nodules, no carotid bruit or JVD   Lungs:   Clear to auscultation bilaterally, respirations unlabored   Chest Wall:  No deformity or tenderness, there is a Nitropatch in place   Heart:  Regular rate and rhythm, S1, S2 normal, no murmur, rub or gallop   Abdomen:   Soft, non-tender, bowel sounds active all four quadrants,  no masses, no organomegaly   Extremities: Extremities normal, atraumatic, no cyanosis or edema   Pulses: 2+ and symmetric   Skin: Skin color, texture, turgor normal, no rashes or lesions   Pysch: Normal mood and affect   Neurologic: Normal gross motor and sensory exam.         Labs   CBC:   Lab Results   Component Value Date    WBC 8.6 04/14/2021    RBC 3.77 04/14/2021    HGB 12.5 04/14/2021    HCT 37.0 04/14/2021    MCV 98.1 04/14/2021    RDW 13.2 04/14/2021     04/14/2021     CMP:  Lab Results   Component Value Date     04/14/2021    K 3.9 04/14/2021     04/14/2021    CO2 25 04/14/2021    BUN 10 04/14/2021    CREATININE 0.8 04/14/2021    GFRAA >60 04/14/2021    GFRAA >60 07/12/2010    AGRATIO 1.7 04/13/2021    LABGLOM >60 04/14/2021    GLUCOSE 122 04/14/2021    PROT 7.2 04/14/2021    CALCIUM 10.2 04/14/2021    BILITOT 0.7 04/14/2021    ALKPHOS 106 04/14/2021    AST 45 04/14/2021    ALT 52 04/14/2021     PT/INR:  No results found for: PTINR  HgBA1c:  Lab Results   Component Value Date    LABA1C 5.6 02/21/2021     Lab Results   Component Value Date    TROPONINI 0.24 (H) 04/14/2021         Cardiac Data     Last EKG: Normal sinus rhythm    Echo:    February 2021:     Summary   Normal left ventricle systolic function with an estimated ejection fraction   of 55-60%.    No regional [address] [phone] [address] [place] [address]  4/14/2021 8:52 AM

## 2021-04-14 NOTE — H&P
Hospital Medicine History & Physical      PCP: Jean FLORES DO    Date of Admission: 4/13/2021    Date of Service: Pt seen/examined on 4/14/2021 and Admitted to observation with expected LOS less than two midnights due to medical therapy. Chief Complaint:  Chest pain    History Of Present Illness:      44 y.o. female, with PMH of HTN, HLD and obesity, who presented to [facility] with chest pain. History obtained from the patient and review of EMR. Patient stated she was walking yesterday when she began to experience 5/10 left-sided chest pain. She stated the pain radiated to her left arm, but was not associated with any shortness of breath and/or any other factors. The patient stated the pain was constant, but would get better at times. However, the patient stated when her  came home from work the pain was getting \"more intense\" so he brought her to the emergency room. The patient denies any history of cardiac disease, but stated she was seen here at [facility] in February for a TIA. The emergency room the patient was found to be hypertensive at 169/92. She was given a one-time dose of hydralazine. The patient had a negative troponin as well as a nonischemic EKG. She will be admitted for further evaluation and treatment. A stress test has been ordered for the a. m. The patient denied any other associated symptoms as well as any aggravating and/or alleviating factors. At the time of this assessment, the patient was resting comfortably in bed. She currently denies any chest pain, back pain, abdominal pain, shortness of breath, numbness, tingling, N/V/C/D, fever and/or chills. Past Medical History:          Diagnosis Date    Decorative tattoo     Low back    Thyroid disease      Past Surgical History:          Procedure Laterality Date    CYST REMOVAL       Medications Prior to Admission:      Prior to Admission medications    Medication Sig Start Date End Date Taking? Authorizing Provider   atorvastatin (LIPITOR) 80 MG tablet Take 1 tablet by mouth nightly 3/2/21   Concha Wilson DO   aspirin 81 MG EC tablet Take 1 tablet by mouth daily 2/23/21   Zhen Thomas MD   SYNTHROID 75 MCG tablet TAKE ONE TABLET BY MOUTH DAILY 1/13/21   Concha Acosta DO   Levocetirizine Dihydrochloride (XYZAL ALLERGY 24HR PO) Take by mouth daily    Historical Provider, MD     Allergies:  Patient has no known allergies. Social History:      The patient currently lives at home    TOBACCO:   reports that she has never smoked. She has never used smokeless tobacco.  ETOH:   reports current alcohol use. E-Cigarettes/Vaping Use     Questions Responses    E-Cigarette/Vaping Use     Start Date     Passive Exposure     Quit Date     Counseling Given     Comments         Family History:      Reviewed in detail. Positive as follows:        Problem Relation Age of Onset    Diabetes Mother     Heart Disease Mother     Diabetes Father     Diabetes Paternal Aunt     Diabetes Paternal Uncle      REVIEW OF SYSTEMS:   Pertinent positives as noted in the HPI. All other systems reviewed and negative. PHYSICAL EXAM PERFORMED:    BP (!) 171/92   Pulse 80   Temp 97.6 °F (36.4 °C) (Oral)   Resp 20   Wt 164 lb (74.4 kg)   SpO2 100%   BMI 30.00 kg/m²     General appearance:  Pleasant obese female in no apparent distress, appears stated age and cooperative. HEENT:  Pupils equal, round, and reactive to light. Extra ocular muscles intact. Conjunctivae/corneas clear. Neck: Supple, with full range of motion. No jugular venous distention. Trachea midline. Respiratory:  Normal respiratory effort. Clear to auscultation, bilaterally without Rales/Wheezes/Rhonchi. Cardiovascular:  Regular rate and rhythm with normal S1/S2 without murmurs, rubs or gallops. Abdomen: Soft,obese, round  non-tender, non-distended with normal bowel sounds. Musculoskeletal:  No clubbing, cyanosis or edema bilaterally.   Full nitro  -tele monitoring  -heart score 4  -ECHO on 2/22/2021:  Normal left ventricle systolic function with an estimated ejection fraction  of 55-60%. No regional wall motion abnormalities are seen. Normal left ventricular diastolic filling pressure. Trace mitral and pulmonic regurgitation  Mild tricuspid regurgitation. Systolic pulmonary artery pressure (SPAP) is normal and estimated at 28 mmHg (right atrial pressure 3 mmHg). Hypertension  -hydralazine given in ED  -lisinopril initiated 4/14/2021  -prn hydralazine    HLD  -continue atorvastatin    Obesity  With Body mass index is 30 kg/m². Complicating assessment and treatment. Placing patient at risk for multiple co-morbidities as well as early death and contributing to the patient's presentation. Counseled on weight loss    Acute transaminitis   -Pt with elevated AST 43 /ALT 61  -unclear etiology  -will trend    DVT Prophylaxis: Lovenox    Diet: No diet orders on file     Code Status: Prior    PT/OT Eval Status: No indication for need at this time    Dispo - 1-2 days pending clinical improvement     RANJITH Herrmann - CNP    Thank you Emmanuel Zuniga DO for the opportunity to be involved in this patient's care.  If you have any questions or concerns please feel free to contact me at [phone].  -----------------------------------Anticipated Dr. Edith bob---------------------------------------------

## 2021-04-14 NOTE — PROGRESS NOTES
Brief Pre-Op Note/Sedation Assessment      Lori Joseph  1970  [id number]      [id number]  1:21 PM    Planned Procedure: Cardiac Catheterization Procedure    Post Procedure Plan: Return to same level of care    Consent: I have discussed with the patient and/or the patient representative the indication, alternatives, and the possible risks and/or complications of the planned procedure and the anesthesia methods. The patient and/or patient representative appear to understand and agree to proceed. DISCUSSION OF CARDIAC CATHETERIZATION PROCEDURES: The procedures, indications, risks and alternatives have been discussed with the patient and, as appropriate, with the patient's guardian . Risks discussed included, but are not limited to, bleeding, development of blood clots/emboli, damage to blood vessels, renal failure, malignant cardiac arrhythmias, stroke, heart attack, emergent coronary bypass surgery, death, dye allergy. The patient (and guardian as appropriate) expressed understanding of the aforementioned and wished to proceed.           Chief Complaint: Chest Pain/Pressure  NSTEMI      Indications for Cath Procedure:  ACS > 24 hrs  Anginal Classification within 2 weeks:  CCS IV - Inability to perform any activity without angina or angina at rest, i.e., severe limitation  NYHA Heart Failure Class within 2 weeks: No symptoms  Is Cath Lab Visit Valve-related?: No  Surgical Risk: N/A  Functional Type: N/A    Anti- Anginal Meds within 2 weeks:   Yes: Aspirin and Statin (Any)    Stress or Imaging Studies Performed (within 6 months):  None     Vital Signs:  /82   Pulse 68   Temp 98.5 °F (36.9 °C) (Oral)   Resp 16   Ht 5' 2.5\" (1.588 m)   Wt 171 lb 15.3 oz (78 kg)   SpO2 95%   BMI 30.95 kg/m²     Allergies:  No Known Allergies    Past Medical History:  Past Medical History:   Diagnosis Date    Decorative tattoo     Low back    Thyroid disease          Surgical History:  Past Surgical PRN Trinh Mendez DO        heparin 25,000 units in dextrose 5% 250 mL (premix) infusion  3.7 mL/hr Intravenous Continuous Raghav Glaser DO 3.7 mL/hr at 04/14/21 1044 3.7 mL/hr at 04/14/21 1044    0.9 % sodium chloride infusion   Intravenous Continuous Trae Cabrera MD 75 mL/hr at 04/14/21 1106 New Bag at 04/14/21 1106    sodium chloride flush 0.9 % injection 5-40 mL  5-40 mL Intravenous 2 times per day Trae Cabrera MD        sodium chloride flush 0.9 % injection 5-40 mL  5-40 mL Intravenous PRN Trae Cabrera MD        0.9 % sodium chloride infusion  25 mL Intravenous PRN Trae Cabrera MD               Pre-Sedation:    Pre-Sedation Documentation and Exam:  I have personally completed a history, physical exam & review of systems for this patient (see notes). Prior History of Anesthesia Complications:   none    Modified Mallampati:  III (soft palate, base of uvula visible)    ASA Classification:  Class 3 - A patient with severe systemic disease that limits activity but is not incapacitating      Ton Scale: Activity:  2 - Able to move 4 extremities voluntarily on command  Respiration:  2 - Able to breathe deeply and cough freely  Circulation:  2 - BP+/- 20mmHg of normal  Consciousness:  2 - Fully awake  Oxygen Saturation (color):  2 - Able to maintain oxygen saturation >92% on room air    Sedation/Anesthesia Plan:  Guard the patient's safety and welfare. Minimize physical discomfort and pain. Minimize negative psychological responses to treatment by providing sedation and analgesia and maximize the potential amnesia. Patient to meet pre-procedure discharge plan.     Medication Planned:  midazolam intravenously and fentanyl intravenously    Patient is an appropriate candidate for plan of sedation: yes      Electronically signed by Trae Cabrera MD on 4/14/2021 at 1:21 PM

## 2021-04-14 NOTE — ED PROVIDER NOTES
[address]      Pt Name: Patricia Walters  MRN: [id number]  [place] 1970  Date of evaluation: 4/13/2021  Provider: Gio Anderson MD    CHIEF COMPLAINT       Chief Complaint   Patient presents with    Chest Pain     CP starting yesterday, into L arm, states not going away         HISTORY OF PRESENT ILLNESS   (Location/Symptom, Timing/Onset, Context/Setting, Quality, Duration, Modifying Factors, Severity)  Note limiting factors. Patricia Walters is a 44 y.o. female with past medical history of hypertension, hyperlipidemia, recent TIA and family history of cardiac disease here with chest pain. Patient states for the last 1 to 2 weeks she has been having intermittent substernal chest pain. Occasionally sharp but worse with exertion. Much worsened today and now radiating into the left arm. Mild shortness of breath but no cough or hemoptysis. No lower extremity swelling, redness or pain. No fevers or chills. No abdominal pain, nausea or vomiting. Said no fevers or chills. Chest pain mild to moderate. HPI    Nursing Notes were reviewed. REVIEW OF SYSTEMS    (2-9 systems for level 4, 10 or more for level 5)     Review of Systems    Please see HPI for pertinent positive and negative review of system findings. A full 10 system ROS was performed and otherwise negative.         PAST MEDICAL HISTORY     Past Medical History:   Diagnosis Date    Decorative tattoo     Low back    Thyroid disease          SURGICAL HISTORY       Past Surgical History:   Procedure Laterality Date    CYST REMOVAL           CURRENT MEDICATIONS       Current Discharge Medication List      CONTINUE these medications which have NOT CHANGED    Details   atorvastatin (LIPITOR) 80 MG tablet Take 1 tablet by mouth nightly  Qty: 90 tablet, Refills: 1      aspirin 81 MG EC tablet Take 1 tablet by mouth daily  Qty: 30 tablet, Refills: 3      SYNTHROID 75 MCG tablet TAKE ONE TABLET BY MOUTH DAILY  Qty: 30 tablet, Refills: 5      Levocetirizine Dihydrochloride (XYZAL ALLERGY 24HR PO) Take by mouth daily             ALLERGIES     Patient has no known allergies.     FAMILY HISTORY       Family History   Problem Relation Age of Onset    Diabetes Mother     Heart Disease Mother     Diabetes Father     Diabetes Paternal Aunt     Diabetes Paternal Uncle           SOCIAL HISTORY       Social History     Socioeconomic History    Marital status:      Spouse name: None    Number of children: None    Years of education: None    Highest education level: None   Occupational History    None   Social Needs    Financial resource strain: Not hard at all   [place]Raymond insecurity     Worry: Never true     Inability: Never true    Transportation needs     Medical: No     Non-medical: No   Tobacco Use    Smoking status: Never Smoker    Smokeless tobacco: Never Used   Substance and Sexual Activity    Alcohol use: Yes     Comment: justin    Drug use: No    Sexual activity: None   Lifestyle    Physical activity     Days per week: None     Minutes per session: None    Stress: None   Relationships    Social connections     Talks on phone: None     Gets together: None     Attends Jehovah[employer] witness service: None     Active member of club or organization: None     Attends meetings of clubs or organizations: None     Relationship status: None    Intimate partner violence     Fear of current or ex partner: None     Emotionally abused: None     Physically abused: None     Forced sexual activity: None   Other Topics Concern    None   Social History Narrative    None       SCREENINGS    [place] Coma Scale  Eye Opening: Spontaneous  Best Verbal Response: Oriented  Best Motor Response: Obeys commands  [place] Coma Scale Score: 15  Clinical Opiate Withdrawal Scale  Resting Pulse rate: 80 beats/min or below  GI upset over last 30 mins: No GI symptoms  Sweating over last 30 mins: No report of chills or flushing  Tremor observed in outreached hands: No tremor  Restlessness observed during assessment: Able to sit still  Yawning observed during assessment: No yawning  Pupil size: Pinned or normal size for room light  Anxiety or Irritability: None  Bone or joint aches: Not present  Gooseflesh skin: Skin is smooth  Running Nose or tearing: Not present  Clinical Opiate Withdrawal Scale Score: 0       PHYSICAL EXAM    (up to 7 for level 4, 8 or more for level 5)     ED Triage Vitals [04/13/21 2316]   BP Temp Temp Source Pulse Resp SpO2 Height Weight   (!) 190/104 97.6 °F (36.4 °C) Oral 64 18 100 % -- 164 lb (74.4 kg)       Physical Exam    General appearance:  Cooperative. No acute distress. Skin:  Warm. Dry. Eye:  Extraocular movements intact. Ears, nose, mouth and throat:  Oral mucosa moist,  Neck:  Trachea midline. Heart:  Regular rate and rhythm  Perfusion:  intact  Respiratory:  Lungs clear to auscultation bilaterally. Respirations nonlabored. Abdominal:   Non distended. Nontender  Neurological:  Alert and oriented x 3.   Moves all extremities spontaneously  Musculoskeletal:   Normal ROM, no deformities          Psychiatric:  Normal mood      DIAGNOSTIC RESULTS       Labs Reviewed   COMPREHENSIVE METABOLIC PANEL - Abnormal; Notable for the following components:       Result Value    Glucose 112 (*)     Alkaline Phosphatase 137 (*)     ALT 61 (*)     AST 43 (*)     All other components within normal limits    Narrative:     Performed at:  [place] [address] [facility]   Phone [phone]   TROPONIN - Abnormal; Notable for the following components:    Troponin 0.11 (*)     All other components within normal limits    Narrative:     Performed at:  [place] [address] [facility]   Phone [phone]   TROPONIN - Abnormal; Notable for the following components:    Troponin 0.24 (*)     All other components within normal limits    Narrative:     Performed at:  [place] [address] [facility]   Phone [phone]   CBC - Abnormal; Notable for the following components:    RBC 3.77 (*)     All other components within normal limits    Narrative:     Performed at:  [place] [address] [facility]   Phone [phone]   BASIC METABOLIC PANEL W/ REFLEX TO MG FOR LOW K - Abnormal; Notable for the following components:    Glucose 122 (*)     All other components within normal limits    Narrative:     Performed at:  [place] [address] [facility]   Phone [phone]   HEPATIC FUNCTION PANEL - Abnormal; Notable for the following components:    ALT 52 (*)     AST 45 (*)     All other components within normal limits    Narrative:     Performed at:  [place] [address] [facility]   Phone [phone]   APTT - Abnormal; Notable for the following components:    aPTT 169.0 (*)     All other components within normal limits    Narrative:     CALL  Ayo Alejo [phone],  Coag results called to and read back by POLY Cisneros, 04/14/2021 09:31, by  Rita Lopez  Performed at:  [place] [address] [facility]   Phone [phone]   APTT - Abnormal; Notable for the following components:    aPTT 64.4 (*)     All other components within normal limits    Narrative:     Performed at:  [place] [address] [facility]   Phone [phone]   CBC WITH AUTO DIFFERENTIAL    Narrative:     Performed at:  [place] [address] [facility]   Phone [phone]   TROPONIN    Narrative:     Performed at:  [address] for further cardiovascular evaluation and work-up. Resting comfortably with improved pain at present    MDM    CONSULTS     IP CONSULT TO HOSPITALIST  IP CONSULT TO CARDIOLOGY  IP CONSULT TO CARDIAC REHAB    Critical Care:     CRITICAL CARE TIME   Total Critical Care time was 30 minutes, excluding separately reportable procedures. There was a high probability of clinically significant/life threatening deterioration in the patient's condition which required my urgent intervention. This time was spent reviewing the patient chart, interpreting diagnostic/laboratory data, administration of IV hydralazine to treat severe uncontrolled hypertension in the setting of chest pain      REASSESSMENT          PROCEDURE     Unless otherwise noted below, none     Procedures      FINAL IMPRESSION      1. Chest pain, unspecified type    2. Hypertensive emergency            DISPOSITION/PLAN   DISPOSITION Admitted 04/14/2021 01:07:45 AM        PATIENT REFERRED TO:  No follow-up provider specified. DISCHARGE MEDICATIONS:  Current Discharge Medication List        Controlled Substances Monitoring:     No flowsheet data found.     (Please note that portions of this note were completed with a voice recognition program.  Efforts were made to edit the dictations but occasionally words are mis-transcribed.)    Fitz Osborn MD (electronically signed)  Attending Emergency Physician            Jacquelin Carrillo MD  04/15/21 [id number]

## 2021-04-14 NOTE — CARE COORDINATION
Chart reviewed by . Triggered by observation status. Pt on B3 being evaluated for chest pain. Followed by internal medicine and cardiology teams. Elevated troponins. per cardiology note today, LHC being completed this morning. Pt has PCP and insurance. No immediate case mgmt needs identified. Please consult CM team if needs arise.

## 2021-04-14 NOTE — ED NOTES
Report given to [facility] from floor. Patient left via wheelchair to floor.      Lai Chavarria RN  04/14/21 8647

## 2021-04-14 NOTE — PROCEDURES
CARDIAC CATHETERIZATION REPORT     Procedure Date:  2021  Patient Name: Jose Enrique Munoz  MRN: [id number] : 1970      INDICATION     Non-STEMI    PROCEDURES PERFORMED     Left heart catheterization  LVgram  Coronary angiogam  Coronary cath  IVUS of left main and circumflex  DFR of left main/circumflex          PROCEDURE DESCRIPTION   Risks/benefits/alternatives/outcomes were discussed with patient and/or family and informed consent was obtained. Using the [facility] scale, the patient's right radial artery was found to be a level B. Patient was prepped draped in the usual sterile fashion. Local anaesthetic was applied over puncture site. Using a back wall technique, a 6 Gabonese Terumo sheath was inserted into right radial artery. Verapamil, nitroglycerin, nicardipine were administered through the sheath. Heparin was administered. Diagnostic 5 CHOOMOGOra [place] Digitaltronic pigtail, ultra, Jasmine, JL 3.5, AL 1, 3 DRC, XB 3, XB 3.5, and 6 Western Geovanna JL 3.0 catheters were used for diagnostic angiograms. Pigtail was used for left ventricular angiogram, 3 DRC was used for RCA angiography. It was difficult to cannulate the at the left main, a JL 3.5 5 Slovenian catheter was able to partially cannulate the left main and a few angiograms were obtained with this but ultimately the left main was cannulated with a 6 Western Geovanna JL 3.0 guiding catheter. This catheter was also utilized for IVUS, DFR as noted below. Conclusion of the procedure, a TR band was placed over the puncture site and hemostasis was obtained. There were no immediate complications. I supervised sedation with versed 4 mg/fentanyl 175 mcg during the procedure. 220 cc contrast was utilized. <20cc EBL. FINDINGS           LVGRAM    LVEDP  11   GRADIENT ACROSS AORTIC VALVE  none   LV FUNCTION EF 60%   WALL MOTION  normal   MITRAL REGURGITATION  mild       CORONARY ARTERIES    LM  Proximal-mid less than 10% stenosis, distal 30 to 40% stenosis.        LAD Small to medium sized vessel, proximal-mid 20 to 30% stenosis, distal 40% stenosis. D1 has diffuse disease with abgmcicx-kqv-pbdjpi 70% stenosis       LCX  10 to 20% proximal stenosis, mid 30 to 40% stenosis that extends into OM 3 which is the largest OM branch. RI Small vessel, ostial/proximal 70% stenosis followed by mid 99% stenosis. RCA Dominant, proximal 10% stenosis, mid 20% stenosis, distal 30% stenosis. PLV has less than 10% proximal/mid-distal stenosis  PDA has less than 10% proximal stenosis, has mid 75% stenosis, distal less than 10% stenosis           DFR/IVUS DESCRIPTION     6 Turkmen JL 3.5 guiding catheter was used for this portion of the procedure. Heparin was utilized for anticoagulation. A choice floppy wire was used to cross the lesions in the left main and circumflex and IVUS was performed which revealed moderate ostial circumflex stenosis of 50 to 60%. Distal left main had 20% stenosis by IVUS. Attention then turned towards DFR and using the [facility], wire was appropriately calibrated/normalized and DFR measurements were 0.92 across the left main into circumflex. This was most consistent with moderate CAD. CONCLUSIONS:     Moderate epicardial CAD/ASHD (in proximal-mid segments)  Severe lesion in small ramus artery  Treat medically with aspirin, add Brilinta.   Continue lisinopril and statin, add beta-blocker

## 2021-04-15 VITALS
HEIGHT: 63 IN | WEIGHT: 162.8 LBS | OXYGEN SATURATION: 96 % | DIASTOLIC BLOOD PRESSURE: 55 MMHG | RESPIRATION RATE: 16 BRPM | BODY MASS INDEX: 28.84 KG/M2 | HEART RATE: 73 BPM | TEMPERATURE: 98 F | SYSTOLIC BLOOD PRESSURE: 105 MMHG

## 2021-04-15 PROBLEM — I25.110 CORONARY ARTERY DISEASE INVOLVING NATIVE CORONARY ARTERY OF NATIVE HEART WITH UNSTABLE ANGINA PECTORIS (HCC): Status: ACTIVE | Noted: 2021-04-15

## 2021-04-15 LAB
ALBUMIN SERPL-MCNC: 3.9 G/DL (ref 3.4–5)
ALP BLD-CCNC: 96 U/L (ref 40–129)
ALT SERPL-CCNC: 39 U/L (ref 10–40)
ANION GAP SERPL CALCULATED.3IONS-SCNC: 9 MMOL/L (ref 3–16)
AST SERPL-CCNC: 36 U/L (ref 15–37)
BILIRUB SERPL-MCNC: 1.1 MG/DL (ref 0–1)
BILIRUBIN DIRECT: <0.2 MG/DL (ref 0–0.3)
BILIRUBIN, INDIRECT: ABNORMAL MG/DL (ref 0–1)
BUN BLDV-MCNC: 12 MG/DL (ref 7–20)
CALCIUM SERPL-MCNC: 9.1 MG/DL (ref 8.3–10.6)
CHLORIDE BLD-SCNC: 110 MMOL/L (ref 99–110)
CO2: 23 MMOL/L (ref 21–32)
CREAT SERPL-MCNC: 0.9 MG/DL (ref 0.6–1.1)
EKG ATRIAL RATE: 62 BPM
EKG DIAGNOSIS: NORMAL
EKG P AXIS: 52 DEGREES
EKG P-R INTERVAL: 156 MS
EKG Q-T INTERVAL: 422 MS
EKG QRS DURATION: 90 MS
EKG QTC CALCULATION (BAZETT): 428 MS
EKG R AXIS: 1 DEGREES
EKG T AXIS: 67 DEGREES
EKG VENTRICULAR RATE: 62 BPM
GFR AFRICAN AMERICAN: >60
GFR NON-AFRICAN AMERICAN: >60
GLUCOSE BLD-MCNC: 111 MG/DL (ref 70–99)
HCT VFR BLD CALC: 32.9 % (ref 36–48)
HEMOGLOBIN: 11 G/DL (ref 12–16)
MCH RBC QN AUTO: 33.1 PG (ref 26–34)
MCHC RBC AUTO-ENTMCNC: 33.4 G/DL (ref 31–36)
MCV RBC AUTO: 99.1 FL (ref 80–100)
PDW BLD-RTO: 13.4 % (ref 12.4–15.4)
PLATELET # BLD: 212 K/UL (ref 135–450)
PMV BLD AUTO: 8.4 FL (ref 5–10.5)
POTASSIUM SERPL-SCNC: 3.8 MMOL/L (ref 3.5–5.1)
RBC # BLD: 3.32 M/UL (ref 4–5.2)
SODIUM BLD-SCNC: 142 MMOL/L (ref 136–145)
TOTAL PROTEIN: 6 G/DL (ref 6.4–8.2)
WBC # BLD: 6.2 K/UL (ref 4–11)

## 2021-04-15 PROCEDURE — G0378 HOSPITAL OBSERVATION PER HR: HCPCS

## 2021-04-15 PROCEDURE — 93010 ELECTROCARDIOGRAM REPORT: CPT | Performed by: INTERNAL MEDICINE

## 2021-04-15 PROCEDURE — 80048 BASIC METABOLIC PNL TOTAL CA: CPT

## 2021-04-15 PROCEDURE — 85027 COMPLETE CBC AUTOMATED: CPT

## 2021-04-15 PROCEDURE — 36415 COLL VENOUS BLD VENIPUNCTURE: CPT

## 2021-04-15 PROCEDURE — 93005 ELECTROCARDIOGRAM TRACING: CPT | Performed by: NURSE PRACTITIONER

## 2021-04-15 PROCEDURE — 80076 HEPATIC FUNCTION PANEL: CPT

## 2021-04-15 PROCEDURE — 6370000000 HC RX 637 (ALT 250 FOR IP): Performed by: INTERNAL MEDICINE

## 2021-04-15 PROCEDURE — 6370000000 HC RX 637 (ALT 250 FOR IP): Performed by: NURSE PRACTITIONER

## 2021-04-15 PROCEDURE — 99215 OFFICE O/P EST HI 40 MIN: CPT | Performed by: NURSE PRACTITIONER

## 2021-04-15 RX ORDER — LISINOPRIL 5 MG/1
5 TABLET ORAL DAILY
Qty: 30 TABLET | Refills: 3 | Status: SHIPPED | OUTPATIENT
Start: 2021-04-16 | End: 2021-08-19 | Stop reason: SDUPTHER

## 2021-04-15 RX ADMIN — TICAGRELOR 90 MG: 90 TABLET ORAL at 08:10

## 2021-04-15 RX ADMIN — LISINOPRIL 5 MG: 5 TABLET ORAL at 08:10

## 2021-04-15 RX ADMIN — METOPROLOL TARTRATE 25 MG: 25 TABLET, FILM COATED ORAL at 08:10

## 2021-04-15 RX ADMIN — ASPIRIN 81 MG: 81 TABLET, COATED ORAL at 08:10

## 2021-04-15 RX ADMIN — LEVOTHYROXINE SODIUM 75 MCG: 0.03 TABLET ORAL at 06:04

## 2021-04-15 RX ADMIN — NITROGLYCERIN 1 INCH: 20 OINTMENT TOPICAL at 00:29

## 2021-04-15 RX ADMIN — ACETAMINOPHEN 650 MG: 325 TABLET ORAL at 00:29

## 2021-04-15 RX ADMIN — NITROGLYCERIN 1 INCH: 20 OINTMENT TOPICAL at 04:42

## 2021-04-15 ASSESSMENT — PAIN SCALES - GENERAL: PAINLEVEL_OUTOF10: 0

## 2021-04-15 NOTE — PROGRESS NOTES
[address]   Daily Progress Note    Admit Date:  4/13/2021  HPI:    Chief Complaint   Patient presents with    Chest Pain     CP starting yesterday, into L arm, states not going away      Presented with chest pain, substernal burning, exertional, radiated to left arm. Troponin's elevated, + NSTEMI. Taken urgently to cath lab, [facility] demonstrated 99% of Ramus artery but a small vessel, other disease noted but not flow limiting. Medical management recommended. Subjective:  Ms. Maxine Toth sitting up in bed. Still with some substernal burning but mild and brief, after breakfast.  Tolerating medicines. Reviewed results of testing, plan of care, medications, activity and diet recommendations.      Objective:   Patient Vitals for the past 24 hrs:   BP Temp Temp src Pulse Resp SpO2 Weight   04/15/21 0808 121/67 98 °F (36.7 °C) Oral 69 16 97 % --   04/15/21 0438 -- -- -- -- -- -- 162 lb 12.8 oz (73.8 kg)   04/15/21 0303 116/71 -- -- 76 16 98 % --   04/15/21 0030 118/73 -- -- 68 -- -- --   04/14/21 2333 105/84 -- -- 72 16 98 % --   04/14/21 2057 100/65 97.4 °F (36.3 °C) Oral 84 16 97 % --   04/14/21 1900 (!) 94/57 -- -- 79 -- 98 % --   04/14/21 1745 109/64 -- -- 70 -- 100 % --   04/14/21 1715 101/63 -- -- 71 -- 99 % --   04/14/21 1637 104/62 -- -- 77 -- 98 % --   04/14/21 1612 (!) 96/56 -- -- 70 -- 100 % --   04/14/21 1556 112/62 -- -- 56 -- 97 % --   04/14/21 1536 (!) 108/58 -- -- 64 -- 97 % --   04/14/21 1519 106/60 -- -- 61 -- 92 % --   04/14/21 1500 105/61 97.4 °F (36.3 °C) Oral 69 16 96 % --   04/14/21 1142 134/82 98.5 °F (36.9 °C) Oral 68 16 95 % --   04/14/21 0845 137/84 97.4 °F (36.3 °C) Oral 70 16 97 % --       Intake/Output Summary (Last 24 hours) at 4/15/2021 0813  Last data filed at 4/15/2021 0438  Gross per 24 hour   Intake 0 ml   Output --   Net 0 ml     Wt Readings from Last 3 Encounters:   04/15/21 162 lb 12.8 oz (73.8 kg)   03/01/21 164 lb 3.2 oz (74.5 kg)   02/21/21 165 lb 3.2 oz (74.9 kg)         ASSESSMENT:   1. NSTEMI: + culprit lesion in Ramus, + disease in LM/ LAD but not flow limiting  2. CAD: started on DAPT, high dose statin, BB  3. HTN: stable  4. HLD: LDL 71, on high dose statin  5. Hypothyroid: per PCP      PLAN:  1. Continue DAPT, high dose statin and BB  2. Okay for discharge from cardiac perspective. Will review cardiac medications on discharge medication reconciliation form. Patient has follow up appointment with me in 1 week. RANJITH Yu - CNP, 4/15/2021, 8:13 AM  [facility]   [phone]       Telemetry: SR 60-80  NYHA: III    Physical Exam:  General:  Awake, alert, NAD  Skin:  Warm and dry  Neck:  JVP 8 cm  Chest:  Clear to auscultation   Cardiovascular:  RRR, normal S1S2, no m/g/r  Abdomen:  Soft, nontender, +bowel sounds  Extremities:  No BLE edema  right radial site without ooze, bruise or hematoma, dressing C,D,I, 2+ pulse      Medications:    aspirin  81 mg Oral Daily    atorvastatin  80 mg Oral Nightly    levothyroxine  75 mcg Oral Daily    sodium chloride flush  5-40 mL Intravenous 2 times per day    nitroglycerin  1 inch Topical 4 times per day    lisinopril  5 mg Oral Daily    sodium chloride flush  5-40 mL Intravenous 2 times per day    sodium chloride flush  5-40 mL Intravenous 2 times per day    ticagrelor  90 mg Oral BID    metoprolol tartrate  25 mg Oral BID      sodium chloride      sodium chloride Stopped (04/15/21 0812)    sodium chloride      sodium chloride         Lab Data: Lab results independently reviewed and analyzed by myself 4/15/21   CBC:   Recent Labs     04/13/21 2335 04/14/21  0547 04/15/21  0625   WBC 8.3 8.6 6.2   HGB 13.8 12.5 11.0*    252 212     BMP:    Recent Labs     04/13/21 2335 04/14/21  0547 04/15/21  0625    141 142   K 4.1 3.9 3.8   CO2 22 25 23   BUN 10 10 12   CREATININE 0.8 0.8 0.9     INR:  No results for input(s): INR in the last 72 hours.   BNP:  No results for input(s): PROBNP in the last 72 hours. Cardiac Enzymes:   Recent Labs     04/13/21  2335 04/14/21  0229 04/14/21  0547   TROPONINI <0.01 0.11* 0.24*     Lipids:   Lab Results   Component Value Date    TRIG 70 04/14/2021    TRIG 177 02/21/2021    HDL 53 04/14/2021    HDL 40 02/21/2021    LDLCALC 71 04/14/2021    LDLCALC 176 02/21/2021       Cardiac Imaging:   LIMITED ECHO 4/14/2021:     Summary   Limited only f/u for LVEF and RVF. Normal left ventricular systolic function with ejection fraction of 55-60%. No regional wall motion abnormalites are seen. Compared to previous study from 2- no changes noted in left   ventricular function. CARDIAC CATH 4/14/2021:   FINDINGS   LVGRAM   LVEDP  11   GRADIENT ACROSS AORTIC VALVE  none   LV FUNCTION EF 60%   WALL MOTION  normal   MITRAL REGURGITATION  mild   CORONARY ARTERIES   LM  Proximal-mid less than 10% stenosis, distal 30 to 40% stenosis. LAD  Small to medium sized vessel, proximal-mid 20 to 30% stenosis, distal 40% stenosis. D1 has diffuse disease with gxixxwxz-zcy-qdlwpo 70% stenosis         LCX  10 to 20% proximal stenosis, mid 30 to 40% stenosis that extends into OM 3 which is the largest OM branch. RI Small vessel, ostial/proximal 70% stenosis followed by mid 99% stenosis. RCA Dominant, proximal 10% stenosis, mid 20% stenosis, distal 30% stenosis. PLV has less than 10% proximal/mid-distal stenosis  PDA has less than 10% proximal stenosis, has mid 75% stenosis, distal less than 10% stenosis      DFR/IVUS DESCRIPTION   6 Korean JL 3.5 guiding catheter was used for this portion of the procedure. Heparin was utilized for anticoagulation. A choice floppy wire was used to cross the lesions in the left main and circumflex and IVUS was performed which revealed moderate ostial circumflex stenosis of 50 to 60%. Distal left main had 20% stenosis by IVUS.   Attention then turned towards DFR and using the [facility], wire was appropriately calibrated/normalized and DFR measurements were 0.92 across the left main into circumflex. This was most consistent with moderate CAD. CONCLUSIONS:   Moderate epicardial CAD/ASHD (in proximal-mid segments)  Severe lesion in small ramus artery  Treat medically with aspirin, add Brilinta. Continue lisinopril and statin, add beta-blocker     Echo February 2021:     Summary   Normal left ventricle systolic function with an estimated ejection fraction of 55-60%. No regional wall motion abnormalities are seen. Normal left ventricular diastolic filling pressure. Trace mitral and pulmonic regurgitation. Mild tricuspid regurgitation. Systolic pulmonary artery pressure (SPAP) is normal and estimated at 28 mmHg (right atrial pressure 3 mmHg).

## 2021-04-15 NOTE — PROGRESS NOTES
Patient discharged home. avs given and prescriptions picked up at inpatient pharmacy. Patient educated using teachback method. Patient taken to main entrance and picked up by .

## 2021-04-16 ENCOUNTER — CARE COORDINATION (OUTPATIENT)
Dept: CASE MANAGEMENT | Age: 51
End: 2021-04-16

## 2021-04-16 DIAGNOSIS — I25.110 CORONARY ARTERY DISEASE INVOLVING NATIVE CORONARY ARTERY OF NATIVE HEART WITH UNSTABLE ANGINA PECTORIS (HCC): Primary | ICD-10-CM

## 2021-04-16 LAB
POC ACT LR: 135 SEC
POC ACT LR: 348 SEC

## 2021-04-23 ENCOUNTER — OFFICE VISIT (OUTPATIENT)
Dept: CARDIOLOGY CLINIC | Age: 51
End: 2021-04-23
Payer: COMMERCIAL

## 2021-04-23 VITALS
HEIGHT: 63 IN | BODY MASS INDEX: 28.44 KG/M2 | HEART RATE: 55 BPM | WEIGHT: 160.5 LBS | OXYGEN SATURATION: 98 % | DIASTOLIC BLOOD PRESSURE: 70 MMHG | SYSTOLIC BLOOD PRESSURE: 120 MMHG

## 2021-04-23 DIAGNOSIS — I25.110 CORONARY ARTERY DISEASE INVOLVING NATIVE CORONARY ARTERY OF NATIVE HEART WITH UNSTABLE ANGINA PECTORIS (HCC): ICD-10-CM

## 2021-04-23 DIAGNOSIS — G45.1 TIA INVOLVING RIGHT INTERNAL CAROTID ARTERY: ICD-10-CM

## 2021-04-23 DIAGNOSIS — E78.2 MIXED HYPERLIPIDEMIA: ICD-10-CM

## 2021-04-23 DIAGNOSIS — I10 ESSENTIAL HYPERTENSION: ICD-10-CM

## 2021-04-23 DIAGNOSIS — I21.4 NON-ST ELEVATION MYOCARDIAL INFARCTION (NSTEMI), SUBSEQUENT EPISODE OF CARE (HCC): Primary | ICD-10-CM

## 2021-04-23 PROCEDURE — 99214 OFFICE O/P EST MOD 30 MIN: CPT | Performed by: NURSE PRACTITIONER

## 2021-04-23 NOTE — PATIENT INSTRUCTIONS
1. Continue current heart medicines  2. Continue low saturated fat diet and no added salt  3. Start exercising with walking, biking at your pace. Referral to cardiac rehab phase II  4. Follow up with Dr. Getachew Hdez in 3 month      Ref. Range 2/21/2021 05:18 4/14/2021 05:47   Cholesterol, Total Latest Ref Range: 0 - 199 mg/dL 251 (H) 138   HDL Cholesterol Latest Ref Range: 40 - 60 mg/dL 40 53   LDL Calculated Latest Ref Range: <100 mg/dL 176 (H) 71   Triglycerides Latest Ref Range: 0 - 150 mg/dL 177 (H) 70         DIETARY FATS    Fat gets a bad rap even though it is a nutrient that we need in our diet, just not too much. Learn all about dietary fats and how getting too much or too little affects our health. Does my body need fats? Yes, it does. Dietary fats are essential to give your body energy and to support cell growth. They also help protect your organs and help keep your body warm. Fats help your body absorb some nutrients and produce important hormones, too. Your body definitely needs fat. How many different fats are there? There are four major dietary fats in the foods we eat:  5. Saturated fats  6. Trans fats  7. Monounsaturated fats  8. Polyunsaturated fats  The four types have different chemical structures and physical properties. The bad fats, saturated and trans fats, tend to be more solid at room temperature (like a stick of butter), while monounsaturated and polyunsaturated fats tend to be more liquid (like liquid vegetable oil). Fats can also have different effects on the cholesterol levels in your body. The bad fats, saturated fats and trans fats raise bad cholesterol (LDL) levels in your blood. Monounsaturated fats and polyunsaturated fats can lower bad cholesterol levels and are beneficial when consumed as part of a healthy dietary pattern. Do all fats have the same number of calories? There are nine calories in every gram of fat, regardless of what type of fat it is.  Fats are more energy-dense than carbohydrates and proteins, which provide four calories per gram.  Consuming high levels of calories  regardless of the source  can lead to weight gain or being overweight. Consuming high levels of saturated or trans fats can also lead to heart disease and stroke. Health experts generally recommend replacing saturated fats and trans fats with monounsaturated fats and polyunsaturated fats  while still maintaining a nutritionally-adequate diet. Are all foods labeled \"trans fat-free\" healthy foods? Not necessarily. Foods labeled 0 trans fat or cooked with trans fat-free oils may contain a lot of saturated fats, which raise your bad cholesterol levels. Trans fat-free foods may also be unhealthy in terms of their general nutrient content. For example, baked goods also tend to be high in added sugars and low in nutrients. Can fats be part of a healthy diet? Eating foods with fat is definitely part of a healthy diet. Just remember to choose foods that provide good fats (monounsaturated and polyunsaturated fats) and balance the amount of calories you eat from all foods with the amount of calories you burn. Aim to eat a dietary pattern that emphasizes intake of vegetables, fruits, and whole grains; includes low-fat dairy products, poultry, fish, legumes, non-tropical vegetable oils and nuts; and limits intake of sodium, sweets, sugar sweetened beverages and red meats. Doing so means that your diet will be low in both saturated fats and trans fats. Does eating more healthfully mean giving up my favorite foods? A healthy diet can include the foods you love. You don't have to avoid these treats entirely, but you do need to eat less of foods that are low in nutrition and high in calories.

## 2021-04-23 NOTE — PROGRESS NOTES
[address]   Cardiac Evaluation    Primary Care Doctor:  Tish FLORES DO    Chief Complaint   Patient presents with    Follow-Up from Hospital        History of Present Illness:   I had the pleasure of seeing Lori Joseph in follow up for recent hospitalization. Lori Joseph presented with chest pain, substernal burning, exertional, radiated to left arm. Troponin's elevated, + NSTEMI. Taken urgently to cath lab, [facility] demonstrated 99% of Ramus artery but a small vessel, other disease noted but not flow limiting. Medical management recommended. She continues to have chest pain, like a burining or squeezing pain, but not severe. Mostly when exerting, cleaning such as vacuuming or making the bed. She is taking the stairs slower and doing okay. Doing better with taking medicines regularly. BP has been 095-186 systolic. No shortness of breath or palpitations. Some fatigue. Following a low saturated fat diet but disappointed with no weight loss. Lori Joseph describes symptoms including chest pain, fatigue but denies dyspnea, palpitations, orthopnea, PND, early saiety, edema, syncope. Activity: has limited activity for past week after [facility]  Appetite: good  Bleeding: no  Sleep:  fair      Past Medical History:   has a past medical history of Decorative tattoo and Thyroid disease. Surgical History:   has a past surgical history that includes cyst removal.   Social History:   reports that she has never smoked. She has never used smokeless tobacco. She reports current alcohol use. She reports that she does not use drugs. Family History:   Family History   Problem Relation Age of Onset    Diabetes Mother     Heart Disease Mother     Diabetes Father     Diabetes Paternal Aunt     Diabetes Paternal Uncle        Home Medications:  Prior to Admission medications    Medication Sig Start Date End Date Taking?  Authorizing Provider   lisinopril (PRINIVIL;ZESTRIL) 5 MG 04/13/2021    RBC 3.32 04/15/2021    RBC 3.77 04/14/2021    RBC 4.13 04/13/2021    HGB 11.0 04/15/2021    HGB 12.5 04/14/2021    HGB 13.8 04/13/2021    HCT 32.9 04/15/2021    HCT 37.0 04/14/2021    HCT 41.0 04/13/2021    MCV 99.1 04/15/2021    MCV 98.1 04/14/2021    MCV 99.2 04/13/2021    RDW 13.4 04/15/2021    RDW 13.2 04/14/2021    RDW 13.2 04/13/2021     04/15/2021     04/14/2021     04/13/2021     BMP:  Lab Results   Component Value Date     04/15/2021     04/14/2021     04/13/2021    K 3.8 04/15/2021    K 3.9 04/14/2021    K 4.1 04/13/2021    K 4.3 02/21/2021    K 3.8 02/20/2021     04/15/2021     04/14/2021     04/13/2021    CO2 23 04/15/2021    CO2 25 04/14/2021    CO2 22 04/13/2021    BUN 12 04/15/2021    BUN 10 04/14/2021    BUN 10 04/13/2021    CREATININE 0.9 04/15/2021    CREATININE 0.8 04/14/2021    CREATININE 0.8 04/13/2021     BNP:   Lab Results   Component Value Date    PROBNP 85 02/20/2021     Troponin: No components found for: TROPONIN  Lipids:   Lab Results   Component Value Date    TRIG 70 04/14/2021    TRIG 177 02/21/2021    HDL 53 04/14/2021    HDL 40 02/21/2021    LDLCALC 71 04/14/2021    LDLCALC 176 02/21/2021     Cardiac Imaging:  LIMITED ECHO 4/14/2021:     Summary   Limited only f/u for LVEF and RVF. Normal left ventricular systolic function with ejection fraction of 55-60%. No regional wall motion abnormalites are seen. Compared to previous study from 2- no changes noted in left   ventricular function. CARDIAC CATH 4/14/2021:   FINDINGS   LVGRAM   LVEDP  11   GRADIENT ACROSS AORTIC VALVE  none   LV FUNCTION EF 60%   WALL MOTION  normal   MITRAL REGURGITATION  mild   CORONARY ARTERIES   LM  Proximalmid less than 10% stenosis, distal 30 to 40% stenosis. LAD  Small to medium sized vessel, proximalmid 20 to 30% stenosis, distal 40% stenosis.      D1 has diffuse disease with proximalmiddistal 70% stenosis involving right internal carotid artery      Reviewed test results with patient including detail of coronary artery disease. Encouraged her to make lifestyle changes and take medicines consistently. She is agreeable with this plan. Plan:   1. Continue current heart medicines  2. Continue low saturated fat diet and no added salt  3. Increase diet of potassium rich foods - chart given  4. Start exercising with walking, biking at your pace. Referral to cardiac rehab phase II  5.  Follow up with Dr. Polly Barajas in 3 month       I appreciate the opportunity of cooperating in the care of this individual.    Joe Ricci, CNP, 4/23/2021, 2:42 PM

## 2021-04-23 NOTE — LETTER
[facility]   Cardiac Evaluation    Primary Care Doctor:  Catherine FLORES DO    Chief Complaint   Patient presents with    Follow-Up from Hospital        History of Present Illness:   I had the pleasure of seeing Trip Donnelly in follow up for recent hospitalization. Trip Donnelly presented with chest pain, substernal burning, exertional, radiated to left arm. Troponin's elevated, + NSTEMI. Taken urgently to cath lab, [facility] demonstrated 99% of Ramus artery but a small vessel, other disease noted but not flow limiting. Medical management recommended. She continues to have chest pain, like a burining or squeezing pain, but not severe. Mostly when exerting, cleaning such as vacuuming or making the bed. She is taking the stairs slower and doing okay. Doing better with taking medicines regularly. BP has been 120-165 systolic. No shortness of breath or palpitations. Some fatigue. Following a low saturated fat diet but disappointed with no weight loss. Trip Donnelly describes symptoms including chest pain, fatigue but denies dyspnea, palpitations, orthopnea, PND, early saiety, edema, syncope. Activity: has limited activity for past week after [facility]  Appetite: good  Bleeding: no  Sleep:  fair      Past Medical History:   has a past medical history of Decorative tattoo and Thyroid disease. Surgical History:   has a past surgical history that includes cyst removal.   Social History:   reports that she has never smoked. She has never used smokeless tobacco. She reports current alcohol use. She reports that she does not use drugs. Family History:   Family History   Problem Relation Age of Onset    Diabetes Mother     Heart Disease Mother     Diabetes Father     Diabetes Paternal Aunt     Diabetes Paternal Uncle        Home Medications:  Prior to Admission medications    Medication Sig Start Date End Date Taking?  Authorizing Provider   lisinopril (PRINIVIL;ZESTRIL) 5 MG tablet Take 1 tablet by mouth daily 4/16/21  Yes Colletta Roads, APRN - CNP   metoprolol tartrate (LOPRESSOR) 25 MG tablet Take 1 tablet by mouth 2 times daily 4/15/21  Yes RANJITH Rodríguez CNP   ticagrelor (BRILINTA) 90 MG TABS tablet Take 1 tablet by mouth 2 times daily 4/15/21  Yes Colletta Roads, APRN - CNP   atorvastatin (LIPITOR) 80 MG tablet Take 1 tablet by mouth nightly 3/2/21  Yes Concha Columbus Shock, DO   aspirin 81 MG EC tablet Take 1 tablet by mouth daily 2/23/21  Yes Miryam Rahman MD   SYNTHROID 75 MCG tablet TAKE ONE TABLET BY MOUTH DAILY 1/13/21  Yes Concha Columbus Shock, DO   Levocetirizine Dihydrochloride (XYZAL ALLERGY 24HR PO) Take by mouth daily   Yes Historical Provider, MD        Allergies:  Patient has no known allergies. Physical Examination:    Vitals:    04/23/21 1434   BP: 120/70   Pulse: 55   SpO2: 98%    Weight: 160 lb 8 oz (72.8 kg)    Constitutional and General Appearance: Warm and dry, no apparent distress, normal coloration  HEENT:  Normocephalic, atraumatic  Respiratory:  · Normal excursion and expansion without use of accessory muscles  · Resp Auscultation: Normal breath sounds without dullness  Cardiovascular:  · The apical impulses not displaced  · Heart tones are crisp and normal  · JVP 8 cm H2O  · Regular rate and rhythm, normal S1S2, no m/g/r  · Peripheral pulses are symmetrical and full  · There is no clubbing, cyanosis of the extremities.   · No BLE edema  · Pedal Pulses: 2+ and equal     · right radial site without ooze, bruise or hematoma, 2+ pulse  Abdomen:  · No masses or tenderness  · Liver/Spleen: No Abnormalities Noted  Neurological/Psychiatric:  · Alert and oriented in all spheres  · Moves all extremities well  · Exhibits normal gait balance and coordination  · No abnormalities of mood, affect, memory, mentation, or behavior are noted    Lab Data:  CBC:   Lab Results   Component Value Date    WBC 6.2 04/15/2021    WBC 8.6 04/14/2021    WBC 8.3 04/13/2021    RBC 3.32 04/15/2021    RBC 3.77 04/14/2021    RBC 4.13 04/13/2021    HGB 11.0 04/15/2021    HGB 12.5 04/14/2021    HGB 13.8 04/13/2021    HCT 32.9 04/15/2021    HCT 37.0 04/14/2021    HCT 41.0 04/13/2021    MCV 99.1 04/15/2021    MCV 98.1 04/14/2021    MCV 99.2 04/13/2021    RDW 13.4 04/15/2021    RDW 13.2 04/14/2021    RDW 13.2 04/13/2021     04/15/2021     04/14/2021     04/13/2021     BMP:  Lab Results   Component Value Date     04/15/2021     04/14/2021     04/13/2021    K 3.8 04/15/2021    K 3.9 04/14/2021    K 4.1 04/13/2021    K 4.3 02/21/2021    K 3.8 02/20/2021     04/15/2021     04/14/2021     04/13/2021    CO2 23 04/15/2021    CO2 25 04/14/2021    CO2 22 04/13/2021    BUN 12 04/15/2021    BUN 10 04/14/2021    BUN 10 04/13/2021    CREATININE 0.9 04/15/2021    CREATININE 0.8 04/14/2021    CREATININE 0.8 04/13/2021     BNP:   Lab Results   Component Value Date    PROBNP 85 02/20/2021     Troponin: No components found for: TROPONIN  Lipids:   Lab Results   Component Value Date    TRIG 70 04/14/2021    TRIG 177 02/21/2021    HDL 53 04/14/2021    HDL 40 02/21/2021    LDLCALC 71 04/14/2021    LDLCALC 176 02/21/2021     Cardiac Imaging:  LIMITED ECHO 4/14/2021:     Summary   Limited only f/u for LVEF and RVF. Normal left ventricular systolic function with ejection fraction of 55-60%. No regional wall motion abnormalites are seen. Compared to previous study from 2- no changes noted in left   ventricular function. CARDIAC CATH 4/14/2021:   FINDINGS   LVGRAM   LVEDP  11   GRADIENT ACROSS AORTIC VALVE  none   LV FUNCTION EF 60%   WALL MOTION  normal   MITRAL REGURGITATION  mild   CORONARY ARTERIES   LM  Proximalmid less than 10% stenosis, distal 30 to 40% stenosis. LAD  Small to medium sized vessel, proximalmid 20 to 30% stenosis, distal 40% stenosis.      D1 has diffuse disease with proximalmiddistal 70% stenosis LCX  10 to 20% proximal stenosis, mid 30 to 40% stenosis that extends into OM 3 which is the largest OM branch. RI Small vessel, ostial/proximal 70% stenosis followed by mid 99% stenosis. RCA Dominant, proximal 10% stenosis, mid 20% stenosis, distal 30% stenosis. PLV has less than 10% proximal/middistal stenosis  PDA has less than 10% proximal stenosis, has mid 75% stenosis, distal less than 10% stenosis      DFR/IVUS DESCRIPTION   6 [place] JL 3.5 guiding catheter was used for this portion of the procedure. Heparin was utilized for anticoagulation. A choice floppy wire was used to cross the lesions in the left main and circumflex and IVUS was performed which revealed moderate ostial circumflex stenosis of 50 to 60%. Distal left main had 20% stenosis by IVUS. Attention then turned towards DFR and using the [facility], wire was appropriately calibrated/normalized and DFR measurements were 0.92 across the left main into circumflex. This was most consistent with moderate CAD. CONCLUSIONS:      Moderate epicardial CAD/ASHD (in proximalmid segments)  Severe lesion in small ramus artery  Treat medically with aspirin, add Brilinta. Continue lisinopril and statin, add beta-blocker       Echo February 2021:     Summary   Normal left ventricle systolic function with an estimated ejection fraction of 55-60%. No regional wall motion abnormalities are seen. Normal left ventricular diastolic filling pressure. Trace mitral and pulmonic regurgitation. Mild tricuspid regurgitation. Systolic pulmonary artery pressure (SPAP) is normal and estimated at 28 mmHg (right atrial pressure 3 mmHg). Assessment:    1. Non-ST elevation myocardial infarction (NSTEMI), subsequent episode of care ([facility] [address] [facility]    2. Coronary artery disease involving native coronary artery of native heart with unstable angina pectoris ([facility] [address] [facility]    3. Essential hypertension    4. Mixed hyperlipidemia    5.  TIA involving right internal carotid artery      Reviewed test results with patient including detail of coronary artery disease. Encouraged her to make lifestyle changes and take medicines consistently. She is agreeable with this plan. Plan:   1. Continue current heart medicines  2. Continue low saturated fat diet and no added salt  3. Increase diet of potassium rich foods - chart given  4. Start exercising with walking, biking at your pace. Referral to cardiac rehab phase II  5.  Follow up with Dr. Michelle Anderson in 3 month       I appreciate the opportunity of cooperating in the care of this individual.    Tatyana Alba CNP, 4/23/2021, 2:42 PM

## 2021-04-28 ENCOUNTER — CARE COORDINATION (OUTPATIENT)
Dept: CASE MANAGEMENT | Age: 51
End: 2021-04-28

## 2021-04-28 NOTE — CARE COORDINATION
[facility] Transitions Follow Up Call    2021    Patient: Anel Klein  Patient : 1970   MRN: [id number]  Reason for Admission: [address]   Discharge Date: 4/15/21 RARS: Readmission Risk Score: 9         Spoke with: [address] with patient who reported she is doing fine. Patient denied any cp and reported she still has some sob but was told that could be expected. Patient reported everything has healed from heart cath via wrist and she had follow up with cardiologist. CTN advised patient of use of urgent care or physicians 24 hr access line if assistance is needed after hours. Also advised that they can always contact their home care provider to request a nurse visit even if it isn't their regularly scheduled day for their nurse to visit. Care Transitions Subsequent and Final Call    Subsequent and Final Calls  Do you have any ongoing symptoms?: No  Have your medications changed?: No  Do you have any questions related to your medications?: No  Do you currently have any active services?: No  Do you have any needs or concerns that I can assist you with?: No  Identified Barriers: None  Care Transitions Interventions  Other Interventions:            Follow Up  Future Appointments   Date Time Provider Anjali Barrios   2021 10:00 AM [facility]   2021 10:00 AM SCHEDULE, J LUIS EVALUATION  J LUIS H[place]   2021  1:30 PM MD Jennifer Ramos [facility]       Meredith Armando RN

## 2021-05-03 ENCOUNTER — VIRTUAL VISIT (OUTPATIENT)
Dept: FAMILY MEDICINE CLINIC | Age: 51
End: 2021-05-03
Payer: COMMERCIAL

## 2021-05-03 DIAGNOSIS — Z20.822 CLOSE EXPOSURE TO COVID-19 VIRUS: Primary | ICD-10-CM

## 2021-05-03 DIAGNOSIS — R05.9 COUGH: ICD-10-CM

## 2021-05-03 PROCEDURE — 99213 OFFICE O/P EST LOW 20 MIN: CPT | Performed by: PHYSICIAN ASSISTANT

## 2021-05-03 ASSESSMENT — ENCOUNTER SYMPTOMS
GASTROINTESTINAL NEGATIVE: 1
COUGH: 1
SHORTNESS OF BREATH: 1

## 2021-05-03 NOTE — PROGRESS NOTES
5/3/2021    TELEHEALTH EVALUATION -- Audio/Visual (During XFWLV-90 public health emergency)    HPI:    Jane Soliman (:  1970) has requested an audio/video evaluation for the following concern(s):    Patient presents with burning and swollen sensation in her nose, congested, lightheadedness and cough for the last 2 weeks. Family dx with covid last week. Ammonia type smell in the nose. Review of Systems   Constitutional: Negative. HENT: Positive for congestion. Burning in the nose with ammonia smell   Respiratory: Positive for cough and shortness of breath. Cardiovascular: Negative. Gastrointestinal: Negative. Genitourinary: Negative. Prior to Visit Medications    Medication Sig Taking? Authorizing Provider   lisinopril (PRINIVIL;ZESTRIL) 5 MG tablet Take 1 tablet by mouth daily Yes RANJITH Berrios CNP   metoprolol tartrate (LOPRESSOR) 25 MG tablet Take 1 tablet by mouth 2 times daily Yes RANJITH Berrios CNP   ticagrelor (BRILINTA) 90 MG TABS tablet Take 1 tablet by mouth 2 times daily Yes RANJITH Berrios CNP   atorvastatin (LIPITOR) 80 MG tablet Take 1 tablet by mouth nightly Yes Concha Wilson DO   aspirin 81 MG EC tablet Take 1 tablet by mouth daily Yes Aisha Prader, MD   SYNTHROID 75 MCG tablet TAKE ONE TABLET BY MOUTH DAILY Yes Concha Wilson DO   Levocetirizine Dihydrochloride (XYZAL ALLERGY 24HR PO) Take by mouth daily Yes Historical Provider, MD       Social History     Tobacco Use    Smoking status: Never Smoker    Smokeless tobacco: Never Used   Substance Use Topics    Alcohol use: Yes     Comment: justin    Drug use:  No            PHYSICAL EXAMINATION:      Constitutional: [x] Appears well-developed and well-nourished [x] No apparent distress      [] Abnormal-   Mental status  [x] Alert and awake  [x] Oriented to person/place/time [x]Able to follow commands        Pulmonary/Chest: [x] Respiratory effort normal.  [x] No visualized signs of difficulty breathing or respiratory distress        [] Abnormal-        ASSESSMENT/PLAN:     Diagnosis Orders   1. Close exposure to COVID-19 virus     2. Cough       Suggest covid testing. Symptomatic treatment. To call if worsening symptoms. Marianna Hardin, was evaluated through a synchronous (real-time) audio-video encounter. The patient (or guardian if applicable) is aware that this is a billable service. Verbal consent to proceed has been obtained within the past 12 months. The visit was conducted pursuant to the emergency declaration under the [address] [place] [address], [address] [place] [address] and the [employer]. Patient identification was verified, and a caregiver was present when appropriate. The patient was located in a state where the provider was credentialed to provide care. Total time spent on this encounter: 21    --BARBARA Parnell on 5/3/2021 at 2:40 PM    An electronic signature was used to authenticate this note.

## 2021-05-17 NOTE — DISCHARGE SUMMARY
Temp 98 °F (36.7 °C) (Oral)   Resp 16   Ht 5' 2.5\" (1.588 m)   Wt 162 lb 12.8 oz (73.8 kg)   SpO2 96%   BMI 29.30 kg/m²   General appearance: No apparent distress, appears stated age and cooperative. HEENT: Pupils equal, round, and reactive to light. Conjunctivae/corneas clear. Neck: Supple, with full range of motion. No jugular venous distention. Trachea midline. Respiratory:  Normal respiratory effort. Clear to auscultation, bilaterally without Rales/Wheezes/Rhonchi. Cardiovascular: Regular rate and rhythm with normal S1/S2 without murmurs, rubs or gallops. Abdomen: Soft, non-tender, non-distended with normal bowel sounds. Musculoskeletal: No clubbing, cyanosis or edema bilaterally. Full range of motion without deformity. Skin: Skin color, texture, turgor normal.  No rashes or lesions. Neurologic:  Neurovascularly intact without any focal sensory/motor deficits. Cranial nerves: II-XII intact, grossly non-focal.  Psychiatric:  Alert and oriented, thought content appropriate, normal insight    Patient Discharge Instructions: Follow-up appointment on April 23rd at 2:15PM with Dora Nino CNP, [facility].     Discharge Medications:   Discharge Medication List as of 4/15/2021 12:06 PM      START taking these medications    Details   lisinopril (PRINIVIL;ZESTRIL) 5 MG tablet Take 1 tablet by mouth daily, Disp-30 tablet, R-3Normal      metoprolol tartrate (LOPRESSOR) 25 MG tablet Take 1 tablet by mouth 2 times daily, Disp-60 tablet, R-3Normal      ticagrelor (BRILINTA) 90 MG TABS tablet Take 1 tablet by mouth 2 times daily, Disp-60 tablet, R-11Normal           Discharge Medication List as of 4/15/2021 12:06 PM        Discharge Medication List as of 4/15/2021 12:06 PM      CONTINUE these medications which have NOT CHANGED    Details   atorvastatin (LIPITOR) 80 MG tablet Take 1 tablet by mouth nightly, Disp-90 tablet, R-1Normal      aspirin 81 MG EC tablet Take 1 tablet by mouth daily, Disp-30

## 2021-05-28 ENCOUNTER — OFFICE VISIT (OUTPATIENT)
Dept: FAMILY MEDICINE CLINIC | Age: 51
End: 2021-05-28
Payer: COMMERCIAL

## 2021-05-28 VITALS
WEIGHT: 156.2 LBS | SYSTOLIC BLOOD PRESSURE: 126 MMHG | DIASTOLIC BLOOD PRESSURE: 74 MMHG | HEIGHT: 63 IN | BODY MASS INDEX: 27.68 KG/M2 | HEART RATE: 54 BPM | OXYGEN SATURATION: 99 %

## 2021-05-28 DIAGNOSIS — I10 ESSENTIAL HYPERTENSION: ICD-10-CM

## 2021-05-28 DIAGNOSIS — E03.9 ACQUIRED HYPOTHYROIDISM: Primary | Chronic | ICD-10-CM

## 2021-05-28 DIAGNOSIS — R73.03 PREDIABETES: ICD-10-CM

## 2021-05-28 DIAGNOSIS — E78.2 MIXED HYPERLIPIDEMIA: ICD-10-CM

## 2021-05-28 PROCEDURE — 99213 OFFICE O/P EST LOW 20 MIN: CPT | Performed by: FAMILY MEDICINE

## 2021-05-28 RX ORDER — ATORVASTATIN CALCIUM 80 MG/1
80 TABLET, FILM COATED ORAL NIGHTLY
Qty: 90 TABLET | Refills: 1 | Status: SHIPPED | OUTPATIENT
Start: 2021-05-28 | End: 2021-11-29 | Stop reason: SDUPTHER

## 2021-05-28 RX ORDER — LEVOTHYROXINE SODIUM 75 MCG
TABLET ORAL
Qty: 90 TABLET | Refills: 1 | Status: SHIPPED | OUTPATIENT
Start: 2021-05-28 | End: 2021-11-29 | Stop reason: SDUPTHER

## 2021-05-28 ASSESSMENT — PATIENT HEALTH QUESTIONNAIRE - PHQ9
2. FEELING DOWN, DEPRESSED OR HOPELESS: 0
SUM OF ALL RESPONSES TO PHQ QUESTIONS 1-9: 0
SUM OF ALL RESPONSES TO PHQ9 QUESTIONS 1 & 2: 0

## 2021-05-30 ASSESSMENT — ENCOUNTER SYMPTOMS
RHINORRHEA: 0
SHORTNESS OF BREATH: 0
EYE DISCHARGE: 0
BLOOD IN STOOL: 0
NAUSEA: 0
CONSTIPATION: 0
SINUS PRESSURE: 0
WHEEZING: 0
ABDOMINAL PAIN: 0
EYE REDNESS: 0
COLOR CHANGE: 0
COUGH: 0
DIARRHEA: 0
CHEST TIGHTNESS: 0
EYE PAIN: 0
VOMITING: 0

## 2021-05-30 NOTE — PROGRESS NOTES
DR Scott Ovens PT ON DROPS. Subjective:      Patient ID: Lawanda Fan is a 44 y.o. female. HPI  Chief Complaint   Patient presents with    Hypothyroidism     Patient is here for a follow up, she is fasting for blood work.  Hypertension    Hyperlipidemia     Here for checkup on thyroid and hyperlipidemia. States has changed her diet but no new exercise regimen states. denies any unusual weight gain or weight loss. Denies any hair loss or increased fatigue  Was in hospital again for NSTEMI. Found to have a small artery that was blocked. Could not stent    Hospital Medicine Discharge Summary     Patient: Lawanda Fan      Age: 44 y.o. Gender: female  : 1970            MRN: [id number]  Code Status: Full      Admit Date: 2021   Discharge Date: 4/15/2021    Disposition:  Home      Condition at Discharge: Stable     Primary Care Provider: Rajiv Ibanez DO     Admitting Physician: Graeme Aceves DO  Discharge Physician: Helen Ponce MD         Discharge Diagnoses:          Active Hospital Problems     Diagnosis      Coronary artery disease involving native coronary artery of native heart with unstable angina pectoris ([facility] [address] [I25.110]      Obesity [E66.9]      Chest pain [R07.9]      Non-STEMI (non-ST elevated myocardial infarction) ([facility] [address] [I21.4]      Essential hypertension [I10]      Mixed hyperlipidemia [E78.2]           Hospital Course:   44 y. o. female, with PMH of HTN, HLD and obesity, who presented to [facility] with chest pain.  History obtained from the patient and review of EMR.  Patient stated she was walking yesterday when she began to experience 5/10 left-sided chest pain.  She stated the pain radiated to her left arm, but was not associated with any shortness of breath and/or any other factors.  The patient stated the pain was constant, but would get better at times. [facility], the patient stated when her  came home from work the pain was getting \"more intense\" so he brought her No current facility-administered medications for this visit. Allergies: Patient has no known allergies. Past Medical History:   Diagnosis Date    Decorative tattoo     Low back    Thyroid disease      Past Surgical History:   Procedure Laterality Date    CYST REMOVAL       Family History   Problem Relation Age of Onset    Diabetes Mother     Heart Disease Mother     Diabetes Father     Diabetes Paternal Aunt     Diabetes Paternal Uncle      Social History     Tobacco Use    Smoking status: Never Smoker    Smokeless tobacco: Never Used   Substance Use Topics    Alcohol use: Yes     Comment: justin     Vitals:    05/28/21 1109   BP: 126/74   Pulse: 54   SpO2: 99%   Weight: 156 lb 3.2 oz (70.9 kg)   Height: 5' 2.5\" (1.588 m)     Body mass index is 28.11 kg/m².      Wt Readings from Last 3 Encounters:   05/28/21 156 lb 3.2 oz (70.9 kg)   04/23/21 160 lb 8 oz (72.8 kg)   04/15/21 162 lb 12.8 oz (73.8 kg)     BP Readings from Last 3 Encounters:   05/28/21 126/74   04/23/21 120/70   04/15/21 (!) 105/55      Lab Review   Admission on 04/13/2021, Discharged on 04/15/2021   Component Date Value    Ventricular Rate 04/13/2021 61     Atrial Rate 04/13/2021 61     P-R Interval 04/13/2021 126     QRS Duration 04/13/2021 94     Q-T Interval 04/13/2021 422     QTc Calculation (Bazett) 04/13/2021 424     P Axis 04/13/2021 52     R Axis 04/13/2021 -16     T Axis 04/13/2021 9     Diagnosis 04/13/2021 Normal sinus rhythmNonspecific T wave abnormalityWhen compared with ECG of 20-FEB-2021 15:32,Flat T waves now evident in Lateral leadsConfirmed by Elaine Stone MD, Sherryle Maxim ([id number]) on 4/14/2021 6:22:57 PM     WBC 04/13/2021 8.3     RBC 04/13/2021 4.13     Hemoglobin 04/13/2021 13.8     Hematocrit 04/13/2021 41.0     MCV 04/13/2021 99.2     MCH 04/13/2021 33.3     MCHC 04/13/2021 33.6     RDW 04/13/2021 13.2     Platelets 03/57/4775 273     MPV 04/13/2021 8.2     Neutrophils % 04/13/2021 65.5     Lymphocytes % 04/13/2021 27.4     Monocytes % 04/13/2021 6.0     Eosinophils % 04/13/2021 0.4     Basophils % 04/13/2021 0.7     Neutrophils Absolute 04/13/2021 5.4     Lymphocytes Absolute 04/13/2021 2.3     Monocytes Absolute 04/13/2021 0.5     Eosinophils Absolute 04/13/2021 0.0     Basophils Absolute 04/13/2021 0.1     Sodium 04/13/2021 141     Potassium 04/13/2021 4.1     Chloride 04/13/2021 104     CO2 04/13/2021 22     Anion Gap 04/13/2021 15     Glucose 04/13/2021 112*    BUN 04/13/2021 10     CREATININE 04/13/2021 0.8     GFR Non- 04/13/2021 >60     GFR  04/13/2021 >60     Calcium 04/13/2021 10.5     Total Protein 04/13/2021 8.0     Albumin 04/13/2021 5.0     Albumin/Globulin Ratio 04/13/2021 1.7     Total Bilirubin 04/13/2021 0.7     Alkaline Phosphatase 04/13/2021 137*    ALT 04/13/2021 61*    AST 04/13/2021 43*    Globulin 04/13/2021 3.0     Troponin 04/13/2021 <0.01     Troponin 04/14/2021 0.11*    Troponin 04/14/2021 0.24*    WBC 04/14/2021 8.6     RBC 04/14/2021 3.77*    Hemoglobin 04/14/2021 12.5     Hematocrit 04/14/2021 37.0     MCV 04/14/2021 98.1     MCH 04/14/2021 33.1     MCHC 04/14/2021 33.8     RDW 04/14/2021 13.2     Platelets 96/88/6171 252     MPV 04/14/2021 8.5     Sodium 04/14/2021 141     Potassium reflex Magnesi* 04/14/2021 3.9     Chloride 04/14/2021 104     CO2 04/14/2021 25     Anion Gap 04/14/2021 12     Glucose 04/14/2021 122*    BUN 04/14/2021 10     CREATININE 04/14/2021 0.8     GFR Non- 04/14/2021 >60     GFR  04/14/2021 >60     Calcium 04/14/2021 10.2     Cholesterol, Total 04/14/2021 138     Triglycerides 04/14/2021 70     HDL 04/14/2021 53     LDL Calculated 04/14/2021 71     VLDL Cholesterol Calcula* 04/14/2021 14     hCG Qual 04/14/2021 Negative     Total Protein 04/14/2021 7.2     Albumin 04/14/2021 4.5     Alkaline Phosphatase 04/14/2021 106     ALT 04/14/2021 52*    AST 04/14/2021 45*    Total Bilirubin 04/14/2021 0.7     Bilirubin, Direct 04/14/2021 <0.2     Bilirubin, Indirect 04/14/2021 see below     Ventricular Rate 04/14/2021 74     Atrial Rate 04/14/2021 74     P-R Interval 04/14/2021 148     QRS Duration 04/14/2021 90     Q-T Interval 04/14/2021 410     QTc Calculation (Bazett) 04/14/2021 455     P Axis 04/14/2021 46     R Axis 04/14/2021 -9     T Axis 04/14/2021 18     Diagnosis 04/14/2021 Normal sinus rhythmNormal ECGWhen compared with ECG of 13-APR-2021 23:05,Flat T waves no longer evident in Lateral leadsConfirmed by Ludwig Favre MD, Himanshu Lopez ([id number]) on 4/14/2021 6:23:27 PM     Rejected Test 04/14/2021 PTT     Reason for Rejection 04/14/2021 see below     aPTT 04/14/2021 169.0*    aPTT 04/14/2021 64.4*    Left Ventricular Ejectio* 04/14/2021 60     LVEF MODALITY 04/14/2021 CATH     Total Protein 04/15/2021 6.0*    Albumin 04/15/2021 3.9     Alkaline Phosphatase 04/15/2021 96     ALT 04/15/2021 39     AST 04/15/2021 36     Total Bilirubin 04/15/2021 1.1*    Bilirubin, Direct 04/15/2021 <0.2     Bilirubin, Indirect 04/15/2021 see below     WBC 04/15/2021 6.2     RBC 04/15/2021 3.32*    Hemoglobin 04/15/2021 11.0*    Hematocrit 04/15/2021 32.9*    MCV 04/15/2021 99.1     MCH 04/15/2021 33.1     MCHC 04/15/2021 33.4     RDW 04/15/2021 13.4     Platelets 60/45/7543 212     MPV 04/15/2021 8.4     Sodium 04/15/2021 142     Potassium 04/15/2021 3.8     Chloride 04/15/2021 110     CO2 04/15/2021 23     Anion Gap 04/15/2021 9     Glucose 04/15/2021 111*    BUN 04/15/2021 12     CREATININE 04/15/2021 0.9     GFR Non- 04/15/2021 >60     GFR  04/15/2021 >60     Calcium 04/15/2021 9.1     Ventricular Rate 04/15/2021 62     Atrial Rate 04/15/2021 62     P-R Interval 04/15/2021 156     QRS Duration 04/15/2021 90     Q-T Interval 04/15/2021 422     QTc Calculation (Bazett) 04/15/2021 428     P Axis 04/15/2021 52     R Axis 04/15/2021 1     T Axis 04/15/2021 67     Diagnosis 04/15/2021 Normal sinus rhythmNormal ECGWhen compared with ECG of 14-APR-2021 07:21,No significant change was foundConfirmed by Nery Escobar MD, Audrey Adkinser ([id number]) on 4/15/2021 6:47:24 PM     POC ACT LR 04/14/2021 135     POC ACT LR 04/14/2021 348    Nurse Only on 03/29/2021   Component Date Value    Total Protein 03/29/2021 7.1     Albumin 03/29/2021 4.6     Alkaline Phosphatase 03/29/2021 117     ALT 03/29/2021 68*    AST 03/29/2021 36     Total Bilirubin 03/29/2021 0.7     Bilirubin, Direct 03/29/2021 <0.2     Bilirubin, Indirect 03/29/2021 see below        Review of Systems   Constitutional: Negative for chills, fatigue, fever and unexpected weight change. HENT: Negative for ear discharge, ear pain, hearing loss, rhinorrhea, sinus pressure and tinnitus. Eyes: Negative for pain, discharge, redness and visual disturbance. Respiratory: Negative for cough, chest tightness, shortness of breath and wheezing. Cardiovascular: Negative for chest pain and palpitations. Gastrointestinal: Negative for abdominal pain, blood in stool, constipation, diarrhea, nausea and vomiting. Genitourinary: Negative for difficulty urinating, dysuria and hematuria. Musculoskeletal: Negative for arthralgias and joint swelling. Skin: Negative for color change and rash. Neurological: Negative for dizziness, seizures, syncope and headaches. Hematological: Negative for adenopathy. Does not bruise/bleed easily. Psychiatric/Behavioral: Negative for dysphoric mood and sleep disturbance. The patient is not nervous/anxious. Objective:   Physical Exam  Constitutional:       General: She is not in acute distress. Appearance: Normal appearance. She is well-developed. She is not ill-appearing. HENT:      Head: Normocephalic.       Right Ear: Tympanic membrane, ear canal and external ear normal.      Left Ear: Tympanic membrane and ear canal normal.      Nose: Nose normal.      Mouth/Throat:      Pharynx: No oropharyngeal exudate. Eyes:      General: No scleral icterus. Right eye: No discharge. Left eye: No discharge. Extraocular Movements: Extraocular movements intact. Conjunctiva/sclera: Conjunctivae normal.      Pupils: Pupils are equal, round, and reactive to light. Cardiovascular:      Rate and Rhythm: Normal rate and regular rhythm. Heart sounds: Normal heart sounds. No murmur heard. Pulmonary:      Effort: Pulmonary effort is normal.      Breath sounds: Normal breath sounds. No wheezing. Abdominal:      General: Bowel sounds are normal. There is no distension. Palpations: Abdomen is soft. Tenderness: There is no abdominal tenderness. There is no guarding or rebound. Musculoskeletal:         General: No tenderness. Normal range of motion. Cervical back: Normal range of motion and neck supple. No muscular tenderness. Lymphadenopathy:      Cervical: No cervical adenopathy. Skin:     General: Skin is warm. Coloration: Skin is not jaundiced or pale. Findings: No bruising or rash. Neurological:      General: No focal deficit present. Mental Status: She is alert and oriented to person, place, and time. Mental status is at baseline. Cranial Nerves: No cranial nerve deficit. Sensory: No sensory deficit. Motor: No weakness. Coordination: Coordination normal.   Psychiatric:         Mood and Affect: Mood normal.         Behavior: Behavior normal.         Thought Content:  Thought content normal.         Judgment: Judgment normal.         Assessment:      Hypothyroid- euthyroid  hld- well controlled on statin  htn- stable on meds  Prediabetes- diet controlled      Plan:      reviewed [facility] records  Reconciled meds  Reviewed cardiology notes  Orders Placed This Encounter   Medications    SYNTHROID 75 MCG tablet Sig: TAKE ONE TABLET BY MOUTH DAILY     Dispense:  90 tablet     Refill:  1    atorvastatin (LIPITOR) 80 MG tablet     Sig: Take 1 tablet by mouth nightly     Dispense:  90 tablet     Refill:  1             MARIANNA FLORES, DO

## 2021-06-28 ENCOUNTER — HOSPITAL ENCOUNTER (OUTPATIENT)
Dept: CARDIAC REHAB | Age: 51
Setting detail: THERAPIES SERIES
Discharge: HOME | End: 2021-06-28
Payer: COMMERCIAL

## 2021-06-30 ENCOUNTER — HOSPITAL ENCOUNTER (OUTPATIENT)
Dept: CARDIAC REHAB | Age: 51
Setting detail: THERAPIES SERIES
Discharge: HOME | End: 2021-06-30
Payer: COMMERCIAL

## 2021-06-30 PROCEDURE — 93798 PHYS/QHP OP CAR RHAB W/ECG: CPT

## 2021-06-30 NOTE — TELEPHONE ENCOUNTER
Cristian Mota is requesting refill(s) aspirin  Last OV 5/28/21 (pertaining to medication)  LR 2/23/21 (per medication requested)  Next office visit scheduled or attempted Yes   If no, reason:  11/29/21

## 2021-07-01 RX ORDER — ASPIRIN 81 MG/1
81 TABLET ORAL DAILY
Qty: 30 TABLET | Refills: 5 | Status: SHIPPED | OUTPATIENT
Start: 2021-07-01 | End: 2022-01-13

## 2021-07-02 ENCOUNTER — HOSPITAL ENCOUNTER (OUTPATIENT)
Dept: CARDIAC REHAB | Age: 51
Setting detail: THERAPIES SERIES
Discharge: HOME | End: 2021-07-02
Payer: COMMERCIAL

## 2021-07-02 PROCEDURE — 93798 PHYS/QHP OP CAR RHAB W/ECG: CPT

## 2021-07-07 ENCOUNTER — HOSPITAL ENCOUNTER (OUTPATIENT)
Dept: CARDIAC REHAB | Age: 51
Setting detail: THERAPIES SERIES
Discharge: HOME | End: 2021-07-07
Payer: COMMERCIAL

## 2021-07-07 PROCEDURE — 93798 PHYS/QHP OP CAR RHAB W/ECG: CPT

## 2021-07-09 ENCOUNTER — HOSPITAL ENCOUNTER (OUTPATIENT)
Dept: CARDIAC REHAB | Age: 51
Setting detail: THERAPIES SERIES
Discharge: HOME | End: 2021-07-09
Payer: COMMERCIAL

## 2021-07-09 PROCEDURE — 93798 PHYS/QHP OP CAR RHAB W/ECG: CPT

## 2021-07-12 ENCOUNTER — HOSPITAL ENCOUNTER (OUTPATIENT)
Dept: CARDIAC REHAB | Age: 51
Setting detail: THERAPIES SERIES
Discharge: HOME | End: 2021-07-12
Payer: COMMERCIAL

## 2021-07-12 PROCEDURE — 93798 PHYS/QHP OP CAR RHAB W/ECG: CPT

## 2021-07-14 ENCOUNTER — HOSPITAL ENCOUNTER (OUTPATIENT)
Dept: CARDIAC REHAB | Age: 51
Setting detail: THERAPIES SERIES
Discharge: HOME | End: 2021-07-14
Payer: COMMERCIAL

## 2021-07-14 PROCEDURE — 93798 PHYS/QHP OP CAR RHAB W/ECG: CPT

## 2021-07-16 ENCOUNTER — HOSPITAL ENCOUNTER (OUTPATIENT)
Dept: CARDIAC REHAB | Age: 51
Setting detail: THERAPIES SERIES
Discharge: HOME | End: 2021-07-16
Payer: COMMERCIAL

## 2021-07-16 PROCEDURE — 93798 PHYS/QHP OP CAR RHAB W/ECG: CPT

## 2021-07-19 ENCOUNTER — HOSPITAL ENCOUNTER (OUTPATIENT)
Dept: CARDIAC REHAB | Age: 51
Setting detail: THERAPIES SERIES
Discharge: HOME | End: 2021-07-19
Payer: COMMERCIAL

## 2021-07-19 PROCEDURE — 93798 PHYS/QHP OP CAR RHAB W/ECG: CPT

## 2021-07-21 ENCOUNTER — HOSPITAL ENCOUNTER (OUTPATIENT)
Dept: CARDIAC REHAB | Age: 51
Setting detail: THERAPIES SERIES
Discharge: HOME | End: 2021-07-21
Payer: COMMERCIAL

## 2021-07-21 PROCEDURE — 93798 PHYS/QHP OP CAR RHAB W/ECG: CPT

## 2021-07-23 ENCOUNTER — HOSPITAL ENCOUNTER (OUTPATIENT)
Dept: CARDIAC REHAB | Age: 51
Setting detail: THERAPIES SERIES
Discharge: HOME | End: 2021-07-23
Payer: COMMERCIAL

## 2021-07-23 PROCEDURE — 93798 PHYS/QHP OP CAR RHAB W/ECG: CPT

## 2021-07-26 ENCOUNTER — HOSPITAL ENCOUNTER (OUTPATIENT)
Dept: CARDIAC REHAB | Age: 51
Setting detail: THERAPIES SERIES
Discharge: HOME | End: 2021-07-26
Payer: COMMERCIAL

## 2021-07-26 PROCEDURE — 93798 PHYS/QHP OP CAR RHAB W/ECG: CPT

## 2021-07-27 ENCOUNTER — OFFICE VISIT (OUTPATIENT)
Dept: CARDIOLOGY CLINIC | Age: 51
End: 2021-07-27
Payer: COMMERCIAL

## 2021-07-27 VITALS
SYSTOLIC BLOOD PRESSURE: 120 MMHG | BODY MASS INDEX: 28.35 KG/M2 | DIASTOLIC BLOOD PRESSURE: 70 MMHG | OXYGEN SATURATION: 98 % | HEIGHT: 63 IN | HEART RATE: 56 BPM | WEIGHT: 160 LBS

## 2021-07-27 DIAGNOSIS — R07.9 CHEST PAIN, UNSPECIFIED TYPE: ICD-10-CM

## 2021-07-27 DIAGNOSIS — I10 ESSENTIAL HYPERTENSION: ICD-10-CM

## 2021-07-27 DIAGNOSIS — G45.1 TIA INVOLVING RIGHT INTERNAL CAROTID ARTERY: ICD-10-CM

## 2021-07-27 DIAGNOSIS — E78.2 MIXED HYPERLIPIDEMIA: ICD-10-CM

## 2021-07-27 DIAGNOSIS — I25.110 CORONARY ARTERY DISEASE INVOLVING NATIVE CORONARY ARTERY OF NATIVE HEART WITH UNSTABLE ANGINA PECTORIS (HCC): Primary | ICD-10-CM

## 2021-07-27 PROCEDURE — 99213 OFFICE O/P EST LOW 20 MIN: CPT | Performed by: INTERNAL MEDICINE

## 2021-07-27 NOTE — PROGRESS NOTES
RANJITH Edmonds - RUCHI   metoprolol tartrate (LOPRESSOR) 25 MG tablet Take 1 tablet by mouth 2 times daily 4/15/21  Yes RANJITH Brown CNP   ticagrelor (BRILINTA) 90 MG TABS tablet Take 1 tablet by mouth 2 times daily 4/15/21  Yes RANJITH Brown - CNP   Levocetirizine Dihydrochloride (XYZAL ALLERGY 24HR PO) Take by mouth daily   Yes Historical Provider, MD          Allergies:  Patient has no known allergies. Review of Systems:   A 14 point review of symptoms completed. Pertinent positives identified in the HPI, all other review of symptoms negative as below.       Objective   PHYSICAL EXAM:    Vitals:    07/27/21 1337   BP: 120/70   Pulse: 56   SpO2: 98%    Weight: 160 lb (72.6 kg)         General Appearance:  Alert, cooperative, no distress, appears stated age   Head:  Normocephalic, without obvious abnormality, atraumatic   Eyes:  PERRL, conjunctiva/corneas clear   Nose: Nares normal, no drainage or sinus tenderness   Throat: Lips, mucosa, and tongue normal   Neck: Supple, symmetrical, trachea midline, no adenopathy, thyroid: not enlarged, symmetric, no tenderness/mass/nodules, no carotid bruit or JVD   Lungs:   Clear to auscultation bilaterally, respirations unlabored   Chest Wall:  No deformity or tenderness   Heart:  Regular rate and rhythm, S1, S2 normal, no murmur, rub or gallop   Abdomen:   Soft, non-tender, bowel sounds active all four quadrants,  no masses, no organomegaly   Extremities: Extremities normal, atraumatic, no cyanosis or edema   Pulses: 2+ and symmetric   Skin: Skin color, texture, turgor normal, no rashes or lesions   Pysch: Normal mood and affect   Neurologic: Normal gross motor and sensory exam.         Labs   CBC:   Lab Results   Component Value Date    WBC 6.2 04/15/2021    RBC 3.32 04/15/2021    HGB 11.0 04/15/2021    HCT 32.9 04/15/2021    MCV 99.1 04/15/2021    RDW 13.4 04/15/2021     04/15/2021     CMP:  Lab Results   Component Value Date     04/15/2021    K 3.8 04/15/2021    K 3.9 2021     04/15/2021    CO2 23 04/15/2021    BUN 12 04/15/2021    CREATININE 0.9 04/15/2021    GFRAA >60 04/15/2021    GFRAA >60 2010    AGRATIO 1.7 2021    LABGLOM >60 04/15/2021    GLUCOSE 111 04/15/2021    PROT 6.0 04/15/2021    CALCIUM 9.1 04/15/2021    BILITOT 1.1 04/15/2021    ALKPHOS 96 04/15/2021    AST 36 04/15/2021    ALT 39 04/15/2021     PT/INR:  No results found for: PTINR  HgBA1c:  Lab Results   Component Value Date    LABA1C 5.6 2021     Lab Results   Component Value Date    TROPONINI 0.24 (H) 2021     Lab Results   Component Value Date    CHOL 138 2021    CHOL 251 (H) 2021    CHOL 256 (H) 10/09/2020     Lab Results   Component Value Date    TRIG 70 2021    TRIG 177 (H) 2021    TRIG 237 (H) 10/09/2020     Lab Results   Component Value Date    HDL 53 2021    HDL 40 2021    HDL 47 10/09/2020     Lab Results   Component Value Date    LDLCALC 71 2021    LDLCALC 176 (H) 2021    LDLCALC 162 (H) 10/09/2020     Lab Results   Component Value Date    LABVLDL 14 2021    LABVLDL 35 2021    LABVLDL 47 10/09/2020     No results found for: CHOLHDLRATIO      Cardiac Data     Last EK2021  NSR 62 bpm    Limited Echo: 2021   Summary   Limited only f/u for LVEF and RVF. Normal left ventricular systolic function with ejection fraction of 55-60%. No regional wall motion abnormalites are seen. Compared to previous study from 2021 no changes noted in left   ventricular function. Complete Echo: 21  Summary   Normal left ventricle systolic function with an estimated ejection fraction   of 55-60%. No regional wall motion abnormalities are seen. Normal left ventricular diastolic filling pressure. Trace mitral and pulmonic regurgitation. Mild tricuspid regurgitation.    Systolic pulmonary artery pressure (SPAP) is normal and estimated at 28 mmHg   (right atrial pressure 3 mmHg). Cath: 4/14/2021  FINDINGS               LVGRAM     LVEDP  11   GRADIENT ACROSS AORTIC VALVE  none   LV FUNCTION EF 60%   WALL MOTION  normal   MITRAL REGURGITATION  mild         CORONARY ARTERIES     LM  Proximalmid less than 10% stenosis, distal 30 to 40% stenosis.       LAD  Small to medium sized vessel, proximalmid 20 to 30% stenosis, distal 40% stenosis.     D1 has diffuse disease with proximalmiddistal 70% stenosis         LCX  10 to 20% proximal stenosis, mid 30 to 40% stenosis that extends into OM 3 which is the largest OM branch.         RI Small vessel, ostial/proximal 70% stenosis followed by mid 99% stenosis.       RCA Dominant, proximal 10% stenosis, mid 20% stenosis, distal 30% stenosis.     PLV has less than 10% proximal/middistal stenosis  PDA has less than 10% proximal stenosis, has mid 75% stenosis, distal less than 10% stenosis               DFR/IVUS DESCRIPTION      6 [employer] JL 3.5 guiding catheter was used for this portion of the procedure. Heparin was utilized for anticoagulation. A choice floppy wire was used to cross the lesions in the left main and circumflex and IVUS was performed which revealed moderate ostial circumflex stenosis of 50 to 60%. Distal left main had 20% stenosis by IVUS. Attention then turned towards DFR and using the [facility], wire was appropriately calibrated/normalized and DFR measurements were 0.92 across the left main into circumflex. This was most consistent with moderate CAD.          CONCLUSIONS:      Moderate epicardial CAD/ASHD (in proximalmid segments)  Severe lesion in small ramus artery  Treat medically with aspirin, add Brilinta. Continue lisinopril and statin, add beta-blocker      Studies:       I have reviewed labs and imaging/xray/diagnostic testing in this note. Assessment      1.  Coronary artery disease involving native coronary artery of native heart with unstable angina pectoris ([facility] [address] 2. TIA involving right internal carotid artery    3. Essential hypertension    4. Mixed hyperlipidemia    5. Chest pain, unspecified type                 Plan      1. Monitor chest pain if lingering or worsening or w/ exertion or >10- 20 minutes go back to ED. Consider f/u stress test for residual cad in future if symptoms persist.   2. Continue current medications  3. Healthy diet and regular exercise would be beneficial  4. Follow up in a yearly or sooner if needed    This note was scribed in the presence of Janneth Paramr MD by Mick Duane, RN. Thank you for allowing us to participate in the care of Eduardo. Please call me with any questions [phone]. Janneth Parmar MD, [facility]   Interventional Cardiologist  [place] [id number]  [phone] [facility]  [phone] [address] [place] [address]  7/27/2021 1:51 PM    I will address the patient's cardiac risk factors and adjusted pharmacologic treatment as needed. In addition, I have reinforced the need for patient directed risk factor modification. Tobacco use was discussed with the patient and educated on the negative effects and was asked not to use. All questions and concerns were addressed to the patient/family. Alternatives to my treatment were discussed. I, Dr Janneth Parmar, personally performed the services described in this documentation, as scribed by the above signed scribe in my presence. It is both accurate and complete to my knowledge. I agree with the details independently gathered by the clinical support staff and the scribed note accurately describes my personal service to the patient.

## 2021-07-27 NOTE — PATIENT INSTRUCTIONS
1. Monitor chest pain if lingering or worsening over 20 minutes go back to ED. 2. Continue current medications  3. Healthy diet and regular exercise would be beneficial  4.  Follow up in a yearly or sooner if needed

## 2021-07-28 ENCOUNTER — HOSPITAL ENCOUNTER (OUTPATIENT)
Dept: CARDIAC REHAB | Age: 51
Setting detail: THERAPIES SERIES
Discharge: HOME | End: 2021-07-28
Payer: COMMERCIAL

## 2021-07-28 PROCEDURE — 93798 PHYS/QHP OP CAR RHAB W/ECG: CPT

## 2021-08-02 ENCOUNTER — HOSPITAL ENCOUNTER (OUTPATIENT)
Dept: CARDIAC REHAB | Age: 51
Setting detail: THERAPIES SERIES
Discharge: HOME | End: 2021-08-02
Payer: COMMERCIAL

## 2021-08-02 PROCEDURE — 93798 PHYS/QHP OP CAR RHAB W/ECG: CPT

## 2021-08-04 ENCOUNTER — HOSPITAL ENCOUNTER (OUTPATIENT)
Dept: CARDIAC REHAB | Age: 51
Setting detail: THERAPIES SERIES
Discharge: HOME | End: 2021-08-04
Payer: COMMERCIAL

## 2021-08-04 PROCEDURE — 93798 PHYS/QHP OP CAR RHAB W/ECG: CPT

## 2021-08-06 ENCOUNTER — HOSPITAL ENCOUNTER (OUTPATIENT)
Dept: CARDIAC REHAB | Age: 51
Setting detail: THERAPIES SERIES
Discharge: HOME | End: 2021-08-06
Payer: COMMERCIAL

## 2021-08-06 PROCEDURE — 93798 PHYS/QHP OP CAR RHAB W/ECG: CPT

## 2021-08-09 ENCOUNTER — HOSPITAL ENCOUNTER (OUTPATIENT)
Dept: CARDIAC REHAB | Age: 51
Setting detail: THERAPIES SERIES
Discharge: HOME | End: 2021-08-09
Payer: COMMERCIAL

## 2021-08-09 PROCEDURE — 93798 PHYS/QHP OP CAR RHAB W/ECG: CPT

## 2021-08-11 ENCOUNTER — HOSPITAL ENCOUNTER (OUTPATIENT)
Dept: CARDIAC REHAB | Age: 51
Setting detail: THERAPIES SERIES
Discharge: HOME | End: 2021-08-11
Payer: COMMERCIAL

## 2021-08-11 PROCEDURE — 93798 PHYS/QHP OP CAR RHAB W/ECG: CPT

## 2021-08-13 ENCOUNTER — HOSPITAL ENCOUNTER (OUTPATIENT)
Dept: CARDIAC REHAB | Age: 51
Setting detail: THERAPIES SERIES
Discharge: HOME | End: 2021-08-13
Payer: COMMERCIAL

## 2021-08-13 PROCEDURE — 93798 PHYS/QHP OP CAR RHAB W/ECG: CPT

## 2021-08-16 ENCOUNTER — HOSPITAL ENCOUNTER (OUTPATIENT)
Dept: CARDIAC REHAB | Age: 51
Setting detail: THERAPIES SERIES
Discharge: HOME | End: 2021-08-16
Payer: COMMERCIAL

## 2021-08-16 PROCEDURE — 93798 PHYS/QHP OP CAR RHAB W/ECG: CPT

## 2021-08-18 ENCOUNTER — HOSPITAL ENCOUNTER (OUTPATIENT)
Dept: CARDIAC REHAB | Age: 51
Setting detail: THERAPIES SERIES
Discharge: HOME | End: 2021-08-18
Payer: COMMERCIAL

## 2021-08-18 PROCEDURE — 93798 PHYS/QHP OP CAR RHAB W/ECG: CPT

## 2021-08-18 NOTE — TELEPHONE ENCOUNTER
Pt is requesting a refill on Metoprolol Tartrate 25 mg and Lisinopril 5 mg. Preferred pharmacy is the [address] [place] [address] Pharmacy.

## 2021-08-19 RX ORDER — LISINOPRIL 5 MG/1
5 TABLET ORAL DAILY
Qty: 90 TABLET | Refills: 3 | Status: SHIPPED | OUTPATIENT
Start: 2021-08-19 | End: 2022-04-16 | Stop reason: SDUPTHER

## 2021-08-20 ENCOUNTER — HOSPITAL ENCOUNTER (OUTPATIENT)
Dept: CARDIAC REHAB | Age: 51
Setting detail: THERAPIES SERIES
Discharge: HOME | End: 2021-08-20
Payer: COMMERCIAL

## 2021-08-20 PROCEDURE — 93798 PHYS/QHP OP CAR RHAB W/ECG: CPT

## 2021-08-23 ENCOUNTER — HOSPITAL ENCOUNTER (OUTPATIENT)
Dept: CARDIAC REHAB | Age: 51
Setting detail: THERAPIES SERIES
Discharge: HOME | End: 2021-08-23
Payer: COMMERCIAL

## 2021-08-23 PROCEDURE — 93798 PHYS/QHP OP CAR RHAB W/ECG: CPT

## 2021-08-25 ENCOUNTER — HOSPITAL ENCOUNTER (OUTPATIENT)
Dept: CARDIAC REHAB | Age: 51
Setting detail: THERAPIES SERIES
Discharge: HOME | End: 2021-08-25
Payer: COMMERCIAL

## 2021-08-25 PROCEDURE — 93798 PHYS/QHP OP CAR RHAB W/ECG: CPT

## 2021-08-27 ENCOUNTER — HOSPITAL ENCOUNTER (OUTPATIENT)
Dept: CARDIAC REHAB | Age: 51
Setting detail: THERAPIES SERIES
Discharge: HOME | End: 2021-08-27
Payer: COMMERCIAL

## 2021-08-27 PROCEDURE — 93798 PHYS/QHP OP CAR RHAB W/ECG: CPT

## 2021-08-30 ENCOUNTER — APPOINTMENT (OUTPATIENT)
Dept: CARDIAC REHAB | Age: 51
End: 2021-08-30
Payer: COMMERCIAL

## 2021-11-29 ENCOUNTER — OFFICE VISIT (OUTPATIENT)
Dept: FAMILY MEDICINE CLINIC | Age: 51
End: 2021-11-29
Payer: COMMERCIAL

## 2021-11-29 VITALS
OXYGEN SATURATION: 100 % | HEIGHT: 63 IN | BODY MASS INDEX: 28.77 KG/M2 | DIASTOLIC BLOOD PRESSURE: 64 MMHG | SYSTOLIC BLOOD PRESSURE: 112 MMHG | HEART RATE: 77 BPM | WEIGHT: 162.4 LBS

## 2021-11-29 DIAGNOSIS — Z00.00 ANNUAL PHYSICAL EXAM: Primary | ICD-10-CM

## 2021-11-29 DIAGNOSIS — Z11.59 ENCOUNTER FOR HEPATITIS C SCREENING TEST FOR LOW RISK PATIENT: ICD-10-CM

## 2021-11-29 DIAGNOSIS — R73.03 PREDIABETES: ICD-10-CM

## 2021-11-29 DIAGNOSIS — Z23 FLU VACCINE NEED: ICD-10-CM

## 2021-11-29 DIAGNOSIS — Z23 NEED FOR SHINGLES VACCINE: ICD-10-CM

## 2021-11-29 DIAGNOSIS — I10 ESSENTIAL HYPERTENSION: ICD-10-CM

## 2021-11-29 DIAGNOSIS — E03.9 ACQUIRED HYPOTHYROIDISM: ICD-10-CM

## 2021-11-29 DIAGNOSIS — E78.2 MIXED HYPERLIPIDEMIA: ICD-10-CM

## 2021-11-29 PROBLEM — R07.9 CHEST PAIN: Status: RESOLVED | Noted: 2021-04-14 | Resolved: 2021-11-29

## 2021-11-29 LAB
A/G RATIO: 2 (ref 1.1–2.2)
ALBUMIN SERPL-MCNC: 4.8 G/DL (ref 3.4–5)
ALP BLD-CCNC: 122 U/L (ref 40–129)
ALT SERPL-CCNC: 42 U/L (ref 10–40)
ANION GAP SERPL CALCULATED.3IONS-SCNC: 12 MMOL/L (ref 3–16)
AST SERPL-CCNC: 28 U/L (ref 15–37)
BILIRUB SERPL-MCNC: 0.9 MG/DL (ref 0–1)
BUN BLDV-MCNC: 17 MG/DL (ref 7–20)
CALCIUM SERPL-MCNC: 10.1 MG/DL (ref 8.3–10.6)
CHLORIDE BLD-SCNC: 105 MMOL/L (ref 99–110)
CHOLESTEROL, TOTAL: 133 MG/DL (ref 0–199)
CO2: 25 MMOL/L (ref 21–32)
CREAT SERPL-MCNC: 1 MG/DL (ref 0.6–1.1)
GFR AFRICAN AMERICAN: >60
GFR NON-AFRICAN AMERICAN: 58
GLUCOSE BLD-MCNC: 121 MG/DL (ref 70–99)
HDLC SERPL-MCNC: 60 MG/DL (ref 40–60)
HEPATITIS C ANTIBODY INTERPRETATION: NORMAL
LDL CHOLESTEROL CALCULATED: 50 MG/DL
POTASSIUM SERPL-SCNC: 4.8 MMOL/L (ref 3.5–5.1)
SODIUM BLD-SCNC: 142 MMOL/L (ref 136–145)
T4 FREE: 1.4 NG/DL (ref 0.9–1.8)
TOTAL PROTEIN: 7.2 G/DL (ref 6.4–8.2)
TRIGL SERPL-MCNC: 116 MG/DL (ref 0–150)
TSH REFLEX: 0.02 UIU/ML (ref 0.27–4.2)
VLDLC SERPL CALC-MCNC: 23 MG/DL

## 2021-11-29 PROCEDURE — 90471 IMMUNIZATION ADMIN: CPT | Performed by: FAMILY MEDICINE

## 2021-11-29 PROCEDURE — 90686 IIV4 VACC NO PRSV 0.5 ML IM: CPT | Performed by: FAMILY MEDICINE

## 2021-11-29 PROCEDURE — 99396 PREV VISIT EST AGE 40-64: CPT | Performed by: FAMILY MEDICINE

## 2021-11-29 PROCEDURE — 36415 COLL VENOUS BLD VENIPUNCTURE: CPT | Performed by: FAMILY MEDICINE

## 2021-11-29 PROCEDURE — 90472 IMMUNIZATION ADMIN EACH ADD: CPT | Performed by: FAMILY MEDICINE

## 2021-11-29 PROCEDURE — 90750 HZV VACC RECOMBINANT IM: CPT | Performed by: FAMILY MEDICINE

## 2021-11-29 RX ORDER — LEVOTHYROXINE SODIUM 75 MCG
TABLET ORAL
Qty: 90 TABLET | Refills: 1 | Status: SHIPPED | OUTPATIENT
Start: 2021-11-29 | End: 2022-06-09 | Stop reason: SDUPTHER

## 2021-11-29 RX ORDER — ATORVASTATIN CALCIUM 80 MG/1
80 TABLET, FILM COATED ORAL NIGHTLY
Qty: 90 TABLET | Refills: 1 | Status: SHIPPED | OUTPATIENT
Start: 2021-11-29 | End: 2022-06-20

## 2021-11-29 ASSESSMENT — ENCOUNTER SYMPTOMS
EYE PAIN: 0
COUGH: 0
CHEST TIGHTNESS: 0
WHEEZING: 0
ABDOMINAL PAIN: 0
SINUS PRESSURE: 0
RHINORRHEA: 0
BLOOD IN STOOL: 0
CONSTIPATION: 0
EYE DISCHARGE: 0
VOMITING: 0
SHORTNESS OF BREATH: 0
EYE REDNESS: 0
NAUSEA: 0
DIARRHEA: 0
COLOR CHANGE: 0

## 2021-11-29 ASSESSMENT — PATIENT HEALTH QUESTIONNAIRE - PHQ9
SUM OF ALL RESPONSES TO PHQ QUESTIONS 1-9: 0
SUM OF ALL RESPONSES TO PHQ9 QUESTIONS 1 & 2: 0
1. LITTLE INTEREST OR PLEASURE IN DOING THINGS: 0
2. FEELING DOWN, DEPRESSED OR HOPELESS: 0
SUM OF ALL RESPONSES TO PHQ QUESTIONS 1-9: 0
SUM OF ALL RESPONSES TO PHQ QUESTIONS 1-9: 0

## 2021-11-29 NOTE — PATIENT INSTRUCTIONS

## 2021-11-29 NOTE — PROGRESS NOTES
Subjective:      Patient ID: El Rubio is a 46 y.o. female. HPI  Chief Complaint   Patient presents with    Annual Exam     Patient is here for a 6 month follow up, she is fasting for blood work and has declined a flu shot.     Hyperlipidemia     Here for CPE.  Nonsmoker.  Up-to-date on dental exam due for vision.  Sees Dr. Ladi Wilkinson for her GYN exams. Ricardo Prado had her mammography. Ricardo Prado seemed her yearly for skin exams.  Takes her thyroid medicine daily.   Has not wanted to take any medications for her hyperlipidemia.  Does have a strong family history of heart disease mother with CAD.  States does not exercise regularly  UTD on colonscopy  El Rubio is a 46 y.o. female with the following history as recorded in [facility]:  Patient Active Problem List    Diagnosis Date Noted    Coronary artery disease involving native coronary artery of native heart with unstable angina pectoris ([facility] [address] 04/15/2021    Obesity 04/14/2021    Non-STEMI (non-ST elevated myocardial infarction) ([facility] [address]     TIA involving right internal carotid artery     Essential hypertension 02/20/2021    Family history of coronary artery disease 02/20/2021    RIND (reversible ischemic neurologic deficit), acute ([facility] [address] 02/20/2021    Family hx of colon cancer 01/08/2019    Prediabetes 01/08/2019    Mixed hyperlipidemia 07/03/2018    Acquired hypothyroidism 11/18/2015     Current Outpatient Medications   Medication Sig Dispense Refill    SYNTHROID 75 MCG tablet TAKE ONE TABLET BY MOUTH DAILY 90 tablet 1    atorvastatin (LIPITOR) 80 MG tablet Take 1 tablet by mouth nightly 90 tablet 1    metoprolol tartrate (LOPRESSOR) 25 MG tablet Take 1 tablet by mouth 2 times daily 180 tablet 3    lisinopril (PRINIVIL;ZESTRIL) 5 MG tablet Take 1 tablet by mouth daily 90 tablet 3    aspirin 81 MG EC tablet Take 1 tablet by mouth daily 30 tablet 5    ticagrelor (BRILINTA) 90 MG TABS tablet Take 1 tablet by mouth 2 times daily 60 tablet 11    Levocetirizine Dihydrochloride (XYZAL ALLERGY 24HR PO) Take by mouth daily       No current facility-administered medications for this visit. Allergies: Patient has no known allergies. Past Medical History:   Diagnosis Date    Decorative tattoo     Low back    Thyroid disease      Past Surgical History:   Procedure Laterality Date    CYST REMOVAL       Family History   Problem Relation Age of Onset    Diabetes Mother     Heart Disease Mother     Diabetes Father     Diabetes Paternal Aunt     Diabetes Paternal Uncle      Social History     Tobacco Use    Smoking status: Never Smoker    Smokeless tobacco: Never Used   Substance Use Topics    Alcohol use: Yes     Comment: rarley     Vitals:    11/29/21 1006   BP: 112/64   Pulse: 77   SpO2: 100%   Weight: 162 lb 6.4 oz (73.7 kg)   Height: 5' 2.5\" (1.588 m)     Body mass index is 29.23 kg/m². Wt Readings from Last 3 Encounters:   11/29/21 162 lb 6.4 oz (73.7 kg)   07/27/21 160 lb (72.6 kg)   05/28/21 156 lb 3.2 oz (70.9 kg)     BP Readings from Last 3 Encounters:   11/29/21 112/64   07/27/21 120/70   05/28/21 126/74        Review of Systems   Constitutional: Negative for chills, fatigue, fever and unexpected weight change. HENT: Negative for ear discharge, ear pain, hearing loss, rhinorrhea, sinus pressure and tinnitus. Eyes: Negative for pain, discharge, redness and visual disturbance. Respiratory: Negative for cough, chest tightness, shortness of breath and wheezing. Cardiovascular: Negative for chest pain and palpitations. Gastrointestinal: Negative for abdominal pain, blood in stool, constipation, diarrhea, nausea and vomiting. Genitourinary: Negative for difficulty urinating, dysuria and hematuria. Musculoskeletal: Positive for arthralgias. Skin: Negative for color change and rash. Neurological: Negative for dizziness, seizures, syncope and headaches. Hematological: Negative for adenopathy. Does not bruise/bleed easily. Psychiatric/Behavioral: Negative for dysphoric mood and sleep disturbance. The patient is not nervous/anxious. Objective:   Physical Exam  Constitutional:       General: She is not in acute distress. Appearance: Normal appearance. She is well-developed. She is not ill-appearing. HENT:      Head: Normocephalic. Right Ear: Tympanic membrane, ear canal and external ear normal.      Left Ear: Tympanic membrane and ear canal normal.      Nose: Nose normal.      Mouth/Throat:      Pharynx: No oropharyngeal exudate. Eyes:      General: No scleral icterus. Right eye: No discharge. Left eye: No discharge. Extraocular Movements: Extraocular movements intact. Conjunctiva/sclera: Conjunctivae normal.      Pupils: Pupils are equal, round, and reactive to light. Cardiovascular:      Rate and Rhythm: Normal rate and regular rhythm. Heart sounds: Normal heart sounds. No murmur heard. Pulmonary:      Effort: Pulmonary effort is normal.      Breath sounds: Normal breath sounds. No wheezing. Abdominal:      General: Bowel sounds are normal. There is no distension. Palpations: Abdomen is soft. Tenderness: There is no abdominal tenderness. There is no guarding or rebound. Musculoskeletal:         General: No tenderness. Normal range of motion. Cervical back: Normal range of motion and neck supple. No muscular tenderness. Lymphadenopathy:      Cervical: No cervical adenopathy. Skin:     General: Skin is warm. Coloration: Skin is not jaundiced or pale. Findings: No bruising or rash. Neurological:      General: No focal deficit present. Mental Status: She is alert and oriented to person, place, and time. Mental status is at baseline. Cranial Nerves: No cranial nerve deficit. Sensory: No sensory deficit. Motor: No weakness.       Coordination: Coordination normal.   Psychiatric:         Mood and Affect: Mood normal. Behavior: Behavior normal.         Thought Content: Thought content normal.         Judgment: Judgment normal.         Assessment:      CPE  preDM  Hypothyroid  htn      Plan:      Patient Counseling:  --Nutrition: Stressed importance of moderation in sodium/caffeine intake, saturated fat and cholesterol, caloric balance, sufficient intake of fresh fruits, vegetables, fiber, calcium, iron, and 1 mg of folate supplement per day (for females capable of pregnancy). --Exercise: Stressed the importance of regular exercise. --Dental health: Discussed importance of regular tooth brushing, flossing, and dental visits. --Immunizations reviewed. Daily sunscreen recommended. Yearly FSE discussed  --Discussed benefits of screening colonoscopy.     Orders Placed This Encounter   Procedures    [facility])    LIPID PANEL     Order Specific Question:   Is Patient Fasting?/# of Hours     Answer:   12    COMPREHENSIVE METABOLIC PANEL    TSH with Reflex    HEMOGLOBIN A1C    HEPATITIS C ANTIBODY     Orders Placed This Encounter   Medications    SYNTHROID 75 MCG tablet     Sig: TAKE ONE TABLET BY MOUTH DAILY     Dispense:  90 tablet     Refill:  1    atorvastatin (LIPITOR) 80 MG tablet     Sig: Take 1 tablet by mouth nightly     Dispense:  90 tablet     Refill:  1             MARIANNA Thomas 197 DO SANDRA

## 2021-11-30 LAB
ESTIMATED AVERAGE GLUCOSE: 145.6 MG/DL
HBA1C MFR BLD: 6.7 %
T3 TOTAL: 1.09 NG/ML (ref 0.8–2)

## 2021-12-02 NOTE — RESULT ENCOUNTER NOTE
Patient was advised of results and voiced understanding, she will work on her diet and will let us know if she decides to do the diabetic education.

## 2022-01-12 NOTE — TELEPHONE ENCOUNTER
Sen Rodriguez is requesting refill(s)   Last OV 11/29/2021 (pertaining to medication)  LR 07/01/2021 (per medication requested)  Next office visit scheduled or attempted no

## 2022-01-13 RX ORDER — ASPIRIN 81 MG/1
TABLET, COATED ORAL
Qty: 30 TABLET | Refills: 5 | Status: SHIPPED | OUTPATIENT
Start: 2022-01-13 | End: 2022-06-09 | Stop reason: SDUPTHER

## 2022-04-16 RX ORDER — LISINOPRIL 5 MG/1
5 TABLET ORAL DAILY
Qty: 90 TABLET | Refills: 3 | Status: SHIPPED | OUTPATIENT
Start: 2022-04-16 | End: 2022-04-16 | Stop reason: SDUPTHER

## 2022-04-16 RX ORDER — LISINOPRIL 5 MG/1
5 TABLET ORAL DAILY
Qty: 90 TABLET | Refills: 3 | Status: SHIPPED | OUTPATIENT
Start: 2022-04-16

## 2022-06-08 RX ORDER — LEVOTHYROXINE SODIUM 75 MCG
TABLET ORAL
Qty: 30 TABLET | Refills: 0 | OUTPATIENT
Start: 2022-06-08

## 2022-06-08 NOTE — TELEPHONE ENCOUNTER
El Rubio is requesting refill(s) synthroid  Last OV 11/29/21 (pertaining to medication)  LR 11/29/21 (per medication requested)  Next office visit scheduled or attempted Yes   If no, reason:  LM for pt to call back, is overdue, was due in May, needs to be seen before Dr.Bhatt garcia

## 2022-06-20 RX ORDER — ATORVASTATIN CALCIUM 80 MG/1
TABLET, FILM COATED ORAL
Qty: 90 TABLET | Refills: 1 | Status: SHIPPED | OUTPATIENT
Start: 2022-06-20

## 2022-06-20 NOTE — TELEPHONE ENCOUNTER
Felecia Thomas is requesting refill(s) lipitor  Last OV 11/29/21 (pertaining to medication)  LR 11/29/21 (per medication requested)  Next office visit scheduled or attempted Yes   If no, reason:  6/30/22

## 2022-06-30 ENCOUNTER — OFFICE VISIT (OUTPATIENT)
Dept: FAMILY MEDICINE CLINIC | Age: 52
End: 2022-06-30
Payer: COMMERCIAL

## 2022-06-30 VITALS
SYSTOLIC BLOOD PRESSURE: 126 MMHG | BODY MASS INDEX: 28.84 KG/M2 | OXYGEN SATURATION: 98 % | DIASTOLIC BLOOD PRESSURE: 60 MMHG | WEIGHT: 162.8 LBS | HEART RATE: 57 BPM | HEIGHT: 63 IN

## 2022-06-30 DIAGNOSIS — Z23 NEED FOR SHINGLES VACCINE: ICD-10-CM

## 2022-06-30 DIAGNOSIS — E78.2 MIXED HYPERLIPIDEMIA: Primary | ICD-10-CM

## 2022-06-30 DIAGNOSIS — E03.9 HYPOTHYROIDISM, UNSPECIFIED TYPE: ICD-10-CM

## 2022-06-30 DIAGNOSIS — R73.03 PREDIABETES: ICD-10-CM

## 2022-06-30 DIAGNOSIS — I10 ESSENTIAL HYPERTENSION: ICD-10-CM

## 2022-06-30 PROCEDURE — 99214 OFFICE O/P EST MOD 30 MIN: CPT | Performed by: FAMILY MEDICINE

## 2022-06-30 PROCEDURE — 90750 HZV VACC RECOMBINANT IM: CPT | Performed by: FAMILY MEDICINE

## 2022-06-30 PROCEDURE — 90471 IMMUNIZATION ADMIN: CPT | Performed by: FAMILY MEDICINE

## 2022-06-30 RX ORDER — LEVOTHYROXINE SODIUM 75 MCG
TABLET ORAL
Qty: 90 TABLET | Refills: 1 | Status: CANCELLED | OUTPATIENT
Start: 2022-06-30

## 2022-06-30 SDOH — ECONOMIC STABILITY: FOOD INSECURITY: WITHIN THE PAST 12 MONTHS, THE FOOD YOU BOUGHT JUST DIDN'T LAST AND YOU DIDN'T HAVE MONEY TO GET MORE.: NEVER TRUE

## 2022-06-30 SDOH — ECONOMIC STABILITY: FOOD INSECURITY: WITHIN THE PAST 12 MONTHS, YOU WORRIED THAT YOUR FOOD WOULD RUN OUT BEFORE YOU GOT MONEY TO BUY MORE.: NEVER TRUE

## 2022-06-30 ASSESSMENT — PATIENT HEALTH QUESTIONNAIRE - PHQ9
SUM OF ALL RESPONSES TO PHQ QUESTIONS 1-9: 0
1. LITTLE INTEREST OR PLEASURE IN DOING THINGS: 0
5. POOR APPETITE OR OVEREATING: 0
SUM OF ALL RESPONSES TO PHQ QUESTIONS 1-9: 0
2. FEELING DOWN, DEPRESSED OR HOPELESS: 0
10. IF YOU CHECKED OFF ANY PROBLEMS, HOW DIFFICULT HAVE THESE PROBLEMS MADE IT FOR YOU TO DO YOUR WORK, TAKE CARE OF THINGS AT HOME, OR GET ALONG WITH OTHER PEOPLE: 0
8. MOVING OR SPEAKING SO SLOWLY THAT OTHER PEOPLE COULD HAVE NOTICED. OR THE OPPOSITE, BEING SO FIGETY OR RESTLESS THAT YOU HAVE BEEN MOVING AROUND A LOT MORE THAN USUAL: 0
9. THOUGHTS THAT YOU WOULD BE BETTER OFF DEAD, OR OF HURTING YOURSELF: 0
SUM OF ALL RESPONSES TO PHQ9 QUESTIONS 1 & 2: 0
SUM OF ALL RESPONSES TO PHQ QUESTIONS 1-9: 0
SUM OF ALL RESPONSES TO PHQ QUESTIONS 1-9: 0
7. TROUBLE CONCENTRATING ON THINGS, SUCH AS READING THE NEWSPAPER OR WATCHING TELEVISION: 0
3. TROUBLE FALLING OR STAYING ASLEEP: 0
4. FEELING TIRED OR HAVING LITTLE ENERGY: 0
6. FEELING BAD ABOUT YOURSELF - OR THAT YOU ARE A FAILURE OR HAVE LET YOURSELF OR YOUR FAMILY DOWN: 0

## 2022-06-30 ASSESSMENT — ENCOUNTER SYMPTOMS
ABDOMINAL PAIN: 0
EYE PAIN: 0
CHEST TIGHTNESS: 0
SINUS PRESSURE: 0
VOMITING: 0
EYE DISCHARGE: 0
EYE REDNESS: 0
WHEEZING: 0
COUGH: 0
COLOR CHANGE: 0
CONSTIPATION: 0
SHORTNESS OF BREATH: 0
RHINORRHEA: 0
BLOOD IN STOOL: 0
DIARRHEA: 0
NAUSEA: 0

## 2022-06-30 ASSESSMENT — SOCIAL DETERMINANTS OF HEALTH (SDOH): HOW HARD IS IT FOR YOU TO PAY FOR THE VERY BASICS LIKE FOOD, HOUSING, MEDICAL CARE, AND HEATING?: NOT HARD AT ALL

## 2022-06-30 NOTE — PROGRESS NOTES
Subjective:      Patient ID: Sallie Smyth is a 45 y.o. female. HPI  Chief Complaint   Patient presents with    Hypothyroidism     Patient is here for a 6 month follow up, she is not fasting for blood work. Here for checkup on thyroid, preDM, htn and hyperlipidemia.  States has changed her diet but no new exercise regimen states. denies any unusual weight gain or weight loss.  Denies any hair loss or increased fatigue  Denies any problems with statin- no muscle pain or rash  denies problems with blood pressure medicine. No cough or swelling or dizziness.  Not checking BP at home  Sallie Smyth is a 45 y.o. female with the following history as recorded in [facility]:  Patient Active Problem List    Diagnosis Date Noted    Coronary artery disease involving native coronary artery of native heart with unstable angina pectoris ([facility] [address] 04/15/2021    Obesity 04/14/2021    Non-STEMI (non-ST elevated myocardial infarction) ([facility] [address]     TIA involving right internal carotid artery     Essential hypertension 02/20/2021    Family history of coronary artery disease 02/20/2021    RIND (reversible ischemic neurologic deficit), acute ([facility] [address] 02/20/2021    Family hx of colon cancer 01/08/2019    Prediabetes 01/08/2019    Mixed hyperlipidemia 07/03/2018    Acquired hypothyroidism 11/18/2015     Current Outpatient Medications   Medication Sig Dispense Refill    atorvastatin (LIPITOR) 80 MG tablet TAKE ONE TABLET BY MOUTH ONCE NIGHTLY 90 tablet 1    SYNTHROID 75 MCG tablet TAKE ONE TABLET BY MOUTH DAILY 30 tablet 0    aspirin (ASPIRIN LOW DOSE) 81 MG EC tablet TAKE ONE TABLET BY MOUTH DAILY 30 tablet 0    ticagrelor (BRILINTA) 90 MG TABS tablet Take 1 tablet by mouth 2 times daily 60 tablet 11    metoprolol tartrate (LOPRESSOR) 25 MG tablet Take 1 tablet by mouth 2 times daily 180 tablet 3    lisinopril (PRINIVIL;ZESTRIL) 5 MG tablet Take 1 tablet by mouth daily 90 tablet 3    Levocetirizine Dihydrochloride (XYZAL ALLERGY 24HR PO) Take by mouth daily       No current facility-administered medications for this visit. Allergies: Patient has no known allergies. Past Medical History:   Diagnosis Date    Decorative tattoo     Low back    Thyroid disease      Past Surgical History:   Procedure Laterality Date    CYST REMOVAL       Family History   Problem Relation Age of Onset    Diabetes Mother     Heart Disease Mother     Diabetes Father     Diabetes Paternal Aunt     Diabetes Paternal Uncle      Social History     Tobacco Use    Smoking status: Never Smoker    Smokeless tobacco: Never Used   Substance Use Topics    Alcohol use: Yes     Comment: justin     Vitals:    06/30/22 1438   BP: 126/60   Pulse: 57   SpO2: 98%   Weight: 162 lb 12.8 oz (73.8 kg)   Height: 5' 2.5\" (1.588 m)     Body mass index is 29.3 kg/m². Wt Readings from Last 3 Encounters:   06/30/22 162 lb 12.8 oz (73.8 kg)   11/29/21 162 lb 6.4 oz (73.7 kg)   07/27/21 160 lb (72.6 kg)     BP Readings from Last 3 Encounters:   06/30/22 126/60   11/29/21 112/64   07/27/21 120/70        Review of Systems   Constitutional: Negative for chills, fatigue, fever and unexpected weight change. HENT: Negative for ear discharge, ear pain, hearing loss, rhinorrhea, sinus pressure and tinnitus. Eyes: Negative for pain, discharge, redness and visual disturbance. Respiratory: Negative for cough, chest tightness, shortness of breath and wheezing. Cardiovascular: Negative for chest pain and palpitations. Gastrointestinal: Negative for abdominal pain, blood in stool, constipation, diarrhea, nausea and vomiting. Genitourinary: Negative for difficulty urinating, dysuria and hematuria. Musculoskeletal: Negative for arthralgias and joint swelling. Skin: Negative for color change and rash. Neurological: Negative for dizziness, seizures, syncope and headaches. Hematological: Negative for adenopathy. Does not bruise/bleed easily. Psychiatric/Behavioral: Negative for dysphoric mood and sleep disturbance. The patient is not nervous/anxious. Objective:   Physical Exam  Constitutional:       General: She is not in acute distress. Appearance: Normal appearance. She is well-developed. She is not ill-appearing. HENT:      Head: Normocephalic. Right Ear: Tympanic membrane, ear canal and external ear normal.      Left Ear: Tympanic membrane and ear canal normal.      Nose: Nose normal.      Mouth/Throat:      Pharynx: No oropharyngeal exudate. Eyes:      General: No scleral icterus. Right eye: No discharge. Left eye: No discharge. Extraocular Movements: Extraocular movements intact. Conjunctiva/sclera: Conjunctivae normal.      Pupils: Pupils are equal, round, and reactive to light. Cardiovascular:      Rate and Rhythm: Normal rate and regular rhythm. Heart sounds: Normal heart sounds. No murmur heard. Pulmonary:      Effort: Pulmonary effort is normal.      Breath sounds: Normal breath sounds. No wheezing. Abdominal:      General: Bowel sounds are normal. There is no distension. Palpations: Abdomen is soft. Tenderness: There is no abdominal tenderness. There is no guarding or rebound. Musculoskeletal:         General: No tenderness. Normal range of motion. Cervical back: Normal range of motion and neck supple. No muscular tenderness. Lymphadenopathy:      Cervical: No cervical adenopathy. Skin:     General: Skin is warm. Coloration: Skin is not jaundiced or pale. Findings: No bruising or rash. Neurological:      General: No focal deficit present. Mental Status: She is alert and oriented to person, place, and time. Mental status is at baseline. Cranial Nerves: No cranial nerve deficit. Sensory: No sensory deficit. Motor: No weakness.       Coordination: Coordination normal.   Psychiatric:         Mood and Affect: Mood normal. Behavior: Behavior normal.         Thought Content:  Thought content normal.         Judgment: Judgment normal.         Assessment:      Hypothyroid- on med, check tsh  preDM- check a1c  hld- on statin, check lipid, cmp  htn- stable on meds      Plan:      Orders Placed This Encounter   Procedures    Zoster, SHINMADDISON, (18 yrs +), IM    TSH with Reflex     Standing Status:   Future     Standing Expiration Date:   6/30/2023    LIPID PANEL     Standing Status:   Future     Standing Expiration Date:   6/30/2023     Order Specific Question:   Is Patient Fasting?/# of Hours     Answer:   12    Comprehensive Metabolic Panel     Standing Status:   Future     Standing Expiration Date:   6/30/2023    Hemoglobin A1C     Standing Status:   Future     Standing Expiration Date:   6/30/2023     Refill meds          Yony FLORES,

## 2022-07-11 DIAGNOSIS — R73.03 PREDIABETES: ICD-10-CM

## 2022-07-11 DIAGNOSIS — E78.2 MIXED HYPERLIPIDEMIA: ICD-10-CM

## 2022-07-11 DIAGNOSIS — I10 ESSENTIAL HYPERTENSION: ICD-10-CM

## 2022-07-11 RX ORDER — LEVOTHYROXINE SODIUM 75 MCG
TABLET ORAL
Qty: 30 TABLET | Refills: 0 | Status: SHIPPED | OUTPATIENT
Start: 2022-07-11 | End: 2022-08-08

## 2022-07-12 LAB
A/G RATIO: 2.3 (ref 1.1–2.2)
ALBUMIN SERPL-MCNC: 4.8 G/DL (ref 3.4–5)
ALP BLD-CCNC: 124 U/L (ref 40–129)
ALT SERPL-CCNC: 35 U/L (ref 10–40)
ANION GAP SERPL CALCULATED.3IONS-SCNC: 12 MMOL/L (ref 3–16)
AST SERPL-CCNC: 24 U/L (ref 15–37)
BILIRUB SERPL-MCNC: 1 MG/DL (ref 0–1)
BUN BLDV-MCNC: 12 MG/DL (ref 7–20)
CALCIUM SERPL-MCNC: 10.1 MG/DL (ref 8.3–10.6)
CHLORIDE BLD-SCNC: 105 MMOL/L (ref 99–110)
CHOLESTEROL, TOTAL: 131 MG/DL (ref 0–199)
CO2: 24 MMOL/L (ref 21–32)
CREAT SERPL-MCNC: 0.9 MG/DL (ref 0.6–1.1)
ESTIMATED AVERAGE GLUCOSE: 145.6 MG/DL
GFR AFRICAN AMERICAN: >60
GFR NON-AFRICAN AMERICAN: >60
GLUCOSE BLD-MCNC: 124 MG/DL (ref 70–99)
HBA1C MFR BLD: 6.7 %
HDLC SERPL-MCNC: 55 MG/DL (ref 40–60)
LDL CHOLESTEROL CALCULATED: 56 MG/DL
POTASSIUM SERPL-SCNC: 5.2 MMOL/L (ref 3.5–5.1)
SODIUM BLD-SCNC: 141 MMOL/L (ref 136–145)
T3 TOTAL: 1.1 NG/ML (ref 0.8–2)
T4 FREE: 1.5 NG/DL (ref 0.9–1.8)
TOTAL PROTEIN: 6.9 G/DL (ref 6.4–8.2)
TRIGL SERPL-MCNC: 100 MG/DL (ref 0–150)
TSH REFLEX: 0.02 UIU/ML (ref 0.27–4.2)
VLDLC SERPL CALC-MCNC: 20 MG/DL

## 2022-08-01 ENCOUNTER — TELEPHONE (OUTPATIENT)
Dept: FAMILY MEDICINE CLINIC | Age: 52
End: 2022-08-01

## 2022-08-01 DIAGNOSIS — E03.9 ACQUIRED HYPOTHYROIDISM: Primary | ICD-10-CM

## 2022-08-08 RX ORDER — LEVOTHYROXINE SODIUM 75 MCG
TABLET ORAL
Qty: 30 TABLET | Refills: 0 | Status: SHIPPED
Start: 2022-08-08 | End: 2022-08-17 | Stop reason: DRUGHIGH

## 2022-08-08 RX ORDER — ASPIRIN 81 MG/1
TABLET ORAL
Qty: 30 TABLET | Refills: 5 | Status: SHIPPED | OUTPATIENT
Start: 2022-08-08

## 2022-08-08 NOTE — TELEPHONE ENCOUNTER
Cheyenne Flaherty is requesting refill(s) aspirin, synthroid  Last OV 6/30/22 (pertaining to medication)  LR 7/11/22 (per medication requested)  Next office visit scheduled or attempted Yes   If no, reason:  lab appt on 8/16

## 2022-08-16 ENCOUNTER — NURSE ONLY (OUTPATIENT)
Dept: FAMILY MEDICINE CLINIC | Age: 52
End: 2022-08-16
Payer: COMMERCIAL

## 2022-08-16 DIAGNOSIS — E03.9 ACQUIRED HYPOTHYROIDISM: ICD-10-CM

## 2022-08-16 PROCEDURE — 36415 COLL VENOUS BLD VENIPUNCTURE: CPT | Performed by: FAMILY MEDICINE

## 2022-08-17 LAB
T3 TOTAL: 0.89 NG/ML (ref 0.8–2)
T4 FREE: 1.3 NG/DL (ref 0.9–1.8)
TSH REFLEX: 0.07 UIU/ML (ref 0.27–4.2)

## 2022-08-17 RX ORDER — LEVOTHYROXINE SODIUM 50 MCG
50 TABLET ORAL DAILY
Qty: 30 TABLET | Refills: 2 | Status: SHIPPED | OUTPATIENT
Start: 2022-08-17 | End: 2022-10-21 | Stop reason: SDUPTHER

## 2022-08-17 NOTE — RESULT ENCOUNTER NOTE
Patient was advised of results and states she has looked over the letter but has not scheduled with a new PCP yet, I did emphasize the importance of scheduling with someone soon so they can continue to monitor her thyroid. She was advised of new dosing and script has been loaded in separate refill encounter.

## 2022-08-17 NOTE — TELEPHONE ENCOUNTER
----- Message from Tiny Shara, DO sent at 8/17/2022  6:40 AM EDT -----  Tsh still supressed. Does she have new pcp set up? What is her current dosing? I think it is 75mcg x 6 days. If it is I want to decrease further to 50 mcg daily- needs to see new PCP in 8 weeks.  Load new script #30  1 rf

## 2022-08-17 NOTE — TELEPHONE ENCOUNTER
Patient was advised of new dosing and script has been loaded and pending approval to preferred pharmacy. She has not scheduled with a new PCP yet but has looked over the letter. I did emphasize the importance of scheduling with someone soon in order to monitor and follow up to her thyroid.

## 2022-09-07 RX ORDER — LEVOTHYROXINE SODIUM 75 MCG
TABLET ORAL
Qty: 30 TABLET | OUTPATIENT
Start: 2022-09-07

## 2022-10-21 RX ORDER — LEVOTHYROXINE SODIUM 50 MCG
50 TABLET ORAL DAILY
Qty: 30 TABLET | Refills: 0 | Status: SHIPPED | OUTPATIENT
Start: 2022-10-21

## 2022-10-21 NOTE — TELEPHONE ENCOUNTER
Patient was a previous pt of 's and still has not found a new PCP, she is asking if she can get another 30 days of her medications and says she will call today to establish with someone.

## 2022-11-02 NOTE — PROGRESS NOTES
[facility]   CARDIAC EVALUATION NOTE  [phone]      PCP:  Pcp No    Reason for Consultation/Chief Complaint: 1 year follow up for CAD    Subjective   History of Present Illness:  Mei Valdivia is a 45 y.o. patient who presents for follow up. She presented to ED 4/13/2021 with chest pain, substernal burning, exertional, radiated to left arm. Troponin's elevated, + NSTEMI. LHC demonstrated 99% of Ramus artery but a small vessel, other disease noted but not flow limiting. Medical management recommended. OV 7/21/21, she reported that she still had occasional chest burning that is intermittent. She was unsure if anything causes it. It was not as severe as prior to [facility]. No associated arm pain. She had no new medications. She was taking medications as prescribed and tolerates them well. She gets short of breath when she bends over. No cp/sob w/ exertion. Today she reports that when she was walking up a hill on Halloween her left arm felt heavy, when she got to the top she states it went back to feeling normal. She reports taking all her medications as prescribed and tolerates them well. She denies chest pain, sob, dizziness, syncope and edema. Past Medical History:   has a past medical history of Decorative tattoo and Thyroid disease. Surgical History:   has a past surgical history that includes cyst removal.     Social History:   reports that she has never smoked. She has never used smokeless tobacco. She reports current alcohol use. She reports that she does not use drugs. Family History:  family history includes Diabetes in her father, mother, paternal aunt, and paternal uncle; Heart Disease in her mother. Home Medications:  Were reviewed and are listed in nursing record and/or below  Prior to Admission medications    Medication Sig Start Date End Date Taking?  Authorizing Provider   SYNTHROID 50 MCG tablet Take 1 tablet by mouth Daily 10/21/22  Yes Clayborn Shell BARBARA Sandhu   aspirin (ASPIRIN LOW DOSE) 81 MG EC tablet TAKE ONE TABLET BY MOUTH DAILY 8/8/22  Yes Concha Wilson DO   atorvastatin (LIPITOR) 80 MG tablet TAKE ONE TABLET BY MOUTH ONCE NIGHTLY 6/20/22  Yes BARBARA Grullon   ticagrelor (BRILINTA) 90 MG TABS tablet Take 1 tablet by mouth 2 times daily 4/16/22  Yes Slime Senior MD   metoprolol tartrate (LOPRESSOR) 25 MG tablet Take 1 tablet by mouth 2 times daily 4/16/22  Yes Slime Senior MD   lisinopril (PRINIVIL;ZESTRIL) 5 MG tablet Take 1 tablet by mouth daily 4/16/22  Yes Slime Senior MD   Levocetirizine Dihydrochloride (XYZAL ALLERGY 24HR PO) Take by mouth as needed   Yes Historical Provider, MD          Allergies:  Patient has no known allergies. Review of Systems:   A 14 point review of symptoms completed. Pertinent positives identified in the HPI, all other review of symptoms negative as below.       Objective   PHYSICAL EXAM:    Vitals:    11/03/22 1348   BP: 120/76   Pulse: 50   SpO2: 97%      Weight: 169 lb 8 oz (76.9 kg)         General Appearance:  Alert, cooperative, no distress, appears stated age   Head:  Normocephalic, without obvious abnormality, atraumatic   Eyes:  PERRL, conjunctiva/corneas clear   Nose: Nares normal, no drainage or sinus tenderness   Throat: Lips, mucosa, and tongue normal   Neck: Supple, symmetrical, trachea midline, no adenopathy, thyroid: not enlarged, symmetric, no tenderness/mass/nodules, no carotid bruit or JVD   Lungs:   Clear to auscultation bilaterally, respirations unlabored   Chest Wall:  No deformity or tenderness   Heart:  Regular rate and rhythm, S1, S2 normal, no murmur, rub or gallop   Abdomen:   Soft, non-tender, bowel sounds active all four quadrants,  no masses, no organomegaly   Extremities: Extremities normal, atraumatic, no cyanosis or edema   Pulses: 2+ and symmetric   Skin: Skin color, texture, turgor normal, no rashes or lesions   Pysch: Normal mood and affect Neurologic: Normal gross motor and sensory exam.         Labs   CBC:   Lab Results   Component Value Date/Time    WBC 6.2 04/15/2021 06:25 AM    RBC 3.32 04/15/2021 06:25 AM    HGB 11.0 04/15/2021 06:25 AM    HCT 32.9 04/15/2021 06:25 AM    MCV 99.1 04/15/2021 06:25 AM    RDW 13.4 04/15/2021 06:25 AM     04/15/2021 06:25 AM     CMP:  Lab Results   Component Value Date/Time     2022 01:23 PM    K 5.2 2022 01:23 PM    K 3.9 2021 05:47 AM     2022 01:23 PM    CO2 24 2022 01:23 PM    BUN 12 2022 01:23 PM    CREATININE 0.9 2022 01:23 PM    GFRAA >60 2022 01:23 PM    GFRAA >60 2010 11:26 AM    AGRATIO 2.3 2022 01:23 PM    LABGLOM >60 2022 01:23 PM    GLUCOSE 124 2022 01:23 PM    PROT 6.9 2022 01:23 PM    CALCIUM 10.1 2022 01:23 PM    BILITOT 1.0 2022 01:23 PM    ALKPHOS 124 2022 01:23 PM    AST 24 2022 01:23 PM    ALT 35 2022 01:23 PM     PT/INR:  No results found for: PTINR  HgBA1c:  Lab Results   Component Value Date    LABA1C 6.7 2022     Lab Results   Component Value Date    TROPONINI 0.24 (H) 2021     Lab Results   Component Value Date    CHOL 131 2022    CHOL 133 2021    CHOL 138 2021     Lab Results   Component Value Date    TRIG 100 2022    TRIG 116 2021    TRIG 70 2021     Lab Results   Component Value Date    HDL 55 2022    HDL 60 2021    HDL 53 2021     Lab Results   Component Value Date    LDLCALC 56 2022    LDLCALC 50 2021    LDLCALC 71 2021     Lab Results   Component Value Date    LABVLDL 20 2022    LABVLDL 23 2021    LABVLDL 14 2021     No results found for: CHOLHDLRATIO      Cardiac Data     Last EK2021  NSR 62 bpm    Today nsr nonspec st changes     Limited Echo: 2021   Summary   Limited only f/u for LVEF and RVF.    Normal left ventricular systolic function with ejection fraction of 55-60%. No regional wall motion abnormalites are seen. Compared to previous study from 2- no changes noted in left   ventricular function. Complete Echo: 2/20/21  Summary   Normal left ventricle systolic function with an estimated ejection fraction   of 55-60%. No regional wall motion abnormalities are seen. Normal left ventricular diastolic filling pressure. Trace mitral and pulmonic regurgitation. Mild tricuspid regurgitation. Systolic pulmonary artery pressure (SPAP) is normal and estimated at 28 mmHg   (right atrial pressure 3 mmHg). Cath: 4/14/2021  FINDINGS               LVGRAM     LVEDP  11   GRADIENT ACROSS AORTIC VALVE  none   LV FUNCTION EF 60%   WALL MOTION  normal   MITRAL REGURGITATION  mild         CORONARY ARTERIES     LM  Proximal-mid less than 10% stenosis, distal 30 to 40% stenosis. LAD  Small to medium sized vessel, proximal-mid 20 to 30% stenosis, distal 40% stenosis. D1 has diffuse disease with [id number] 70% stenosis         LCX  10 to 20% proximal stenosis, mid 30 to 40% stenosis that extends into OM 3 which is the largest OM branch. RI Small vessel, ostial/proximal 70% stenosis followed by mid 99% stenosis. RCA Dominant, proximal 10% stenosis, mid 20% stenosis, distal 30% stenosis. PLV has less than 10% proximal/mid-distal stenosis  PDA has less than 10% proximal stenosis, has mid 75% stenosis, distal less than 10% stenosis               DFR/IVUS DESCRIPTION      6 American JL 3.5 guiding catheter was used for this portion of the procedure. Heparin was utilized for anticoagulation. A choice floppy wire was used to cross the lesions in the left main and circumflex and IVUS was performed which revealed moderate ostial circumflex stenosis of 50 to 60%. Distal left main had 20% stenosis by IVUS.   Attention then turned towards DFR and using the [facility], wire was appropriately calibrated/normalized and DFR measurements were 0.92 across the left main into circumflex. This was most consistent with moderate CAD. CONCLUSIONS:      Moderate epicardial CAD/ASHD (in proximal-mid segments)  Severe lesion in small ramus artery  Treat medically with aspirin, add Brilinta. Continue lisinopril and statin, add beta-blocker      Studies:       I have reviewed labs and imaging/xray/diagnostic testing in this note. Assessment      1. Coronary artery disease involving native coronary artery of native heart with unstable angina pectoris ([facility] [address]    2. Non-STEMI (non-ST elevated myocardial infarction) ([facility] [address]    3. TIA involving right internal carotid artery    4. Essential hypertension    5. Mixed hyperlipidemia         Plan    1. Stress Myoview test to assess left arm heaviness/pain, anginal equivalent, abnl ekg   2. Echo test to assess heart function, lt arm pain, abnl ekg  3. Call [phone] to schedule echo and stress test  4. Follow up in 1 year      Scribe's attestation: This note was scribed in the presence of Dr. Tierra Wright MD   by Sridevi Steward LPN         Thank you for allowing us to participate in the care of Eduadro. Please call me with any questions [phone]. Tierra Wright MD, [facility]   Interventional Cardiologist  [place] [address]  [phone] [facility]  [phone] [address] [place] [address]  11/3/2022 2:00 PM    I will address the patient's cardiac risk factors and adjusted pharmacologic treatment as needed. In addition, I have reinforced the need for patient directed risk factor modification. Tobacco use was discussed with the patient and educated on the negative effects and was asked not to use. All questions and concerns were addressed to the patient/family. Alternatives to my treatment were discussed. I, Dr Tierra Wright, personally performed the services described in this documentation, as scribed by the above signed scribe in my presence. It is both accurate and complete to my knowledge. I agree with the details independently gathered by the clinical support staff and the scribed note accurately describes my personal service to the patient.

## 2022-11-03 ENCOUNTER — OFFICE VISIT (OUTPATIENT)
Dept: CARDIOLOGY CLINIC | Age: 52
End: 2022-11-03
Payer: COMMERCIAL

## 2022-11-03 VITALS
BODY MASS INDEX: 30.03 KG/M2 | HEIGHT: 63 IN | HEART RATE: 50 BPM | OXYGEN SATURATION: 97 % | SYSTOLIC BLOOD PRESSURE: 120 MMHG | WEIGHT: 169.5 LBS | DIASTOLIC BLOOD PRESSURE: 76 MMHG

## 2022-11-03 DIAGNOSIS — E78.2 MIXED HYPERLIPIDEMIA: ICD-10-CM

## 2022-11-03 DIAGNOSIS — I25.110 CORONARY ARTERY DISEASE INVOLVING NATIVE CORONARY ARTERY OF NATIVE HEART WITH UNSTABLE ANGINA PECTORIS (HCC): Primary | ICD-10-CM

## 2022-11-03 DIAGNOSIS — G45.1 TIA INVOLVING RIGHT INTERNAL CAROTID ARTERY: ICD-10-CM

## 2022-11-03 DIAGNOSIS — I21.4 NON-STEMI (NON-ST ELEVATED MYOCARDIAL INFARCTION) (HCC): ICD-10-CM

## 2022-11-03 DIAGNOSIS — I10 ESSENTIAL HYPERTENSION: ICD-10-CM

## 2022-11-03 PROCEDURE — 93000 ELECTROCARDIOGRAM COMPLETE: CPT | Performed by: INTERNAL MEDICINE

## 2022-11-03 PROCEDURE — 3078F DIAST BP <80 MM HG: CPT | Performed by: INTERNAL MEDICINE

## 2022-11-03 PROCEDURE — 99214 OFFICE O/P EST MOD 30 MIN: CPT | Performed by: INTERNAL MEDICINE

## 2022-11-03 PROCEDURE — 3074F SYST BP LT 130 MM HG: CPT | Performed by: INTERNAL MEDICINE

## 2022-11-03 NOTE — PATIENT INSTRUCTIONS
Plan    1. Stress Myoview test to assess left arm heaviness  2. Echo test to assess heart function  3. Call [phone] to schedule echo and stress test  4.  Follow up in 1 year

## 2022-11-07 ENCOUNTER — OFFICE VISIT (OUTPATIENT)
Dept: FAMILY MEDICINE CLINIC | Age: 52
End: 2022-11-07
Payer: COMMERCIAL

## 2022-11-07 VITALS
BODY MASS INDEX: 29.59 KG/M2 | DIASTOLIC BLOOD PRESSURE: 82 MMHG | TEMPERATURE: 97.1 F | SYSTOLIC BLOOD PRESSURE: 131 MMHG | HEART RATE: 52 BPM | OXYGEN SATURATION: 94 % | WEIGHT: 167 LBS | HEIGHT: 63 IN

## 2022-11-07 DIAGNOSIS — M72.2 PLANTAR FASCIITIS: ICD-10-CM

## 2022-11-07 DIAGNOSIS — I10 ESSENTIAL HYPERTENSION: ICD-10-CM

## 2022-11-07 DIAGNOSIS — Z76.89 ENCOUNTER TO ESTABLISH CARE WITH NEW DOCTOR: Primary | ICD-10-CM

## 2022-11-07 DIAGNOSIS — E78.2 MIXED HYPERLIPIDEMIA: ICD-10-CM

## 2022-11-07 DIAGNOSIS — M25.552 LEFT HIP PAIN: ICD-10-CM

## 2022-11-07 DIAGNOSIS — Z12.31 ENCOUNTER FOR SCREENING MAMMOGRAM FOR MALIGNANT NEOPLASM OF BREAST: ICD-10-CM

## 2022-11-07 DIAGNOSIS — Z12.11 SCREENING FOR MALIGNANT NEOPLASM OF COLON: ICD-10-CM

## 2022-11-07 DIAGNOSIS — E03.9 ACQUIRED HYPOTHYROIDISM: ICD-10-CM

## 2022-11-07 DIAGNOSIS — E11.9 TYPE 2 DIABETES MELLITUS WITHOUT COMPLICATION, WITHOUT LONG-TERM CURRENT USE OF INSULIN (HCC): ICD-10-CM

## 2022-11-07 PROCEDURE — 3074F SYST BP LT 130 MM HG: CPT | Performed by: STUDENT IN AN ORGANIZED HEALTH CARE EDUCATION/TRAINING PROGRAM

## 2022-11-07 PROCEDURE — 99214 OFFICE O/P EST MOD 30 MIN: CPT | Performed by: STUDENT IN AN ORGANIZED HEALTH CARE EDUCATION/TRAINING PROGRAM

## 2022-11-07 PROCEDURE — 3044F HG A1C LEVEL LT 7.0%: CPT | Performed by: STUDENT IN AN ORGANIZED HEALTH CARE EDUCATION/TRAINING PROGRAM

## 2022-11-07 PROCEDURE — 3078F DIAST BP <80 MM HG: CPT | Performed by: STUDENT IN AN ORGANIZED HEALTH CARE EDUCATION/TRAINING PROGRAM

## 2022-11-07 SDOH — HEALTH STABILITY: PHYSICAL HEALTH: ON AVERAGE, HOW MANY MINUTES DO YOU ENGAGE IN EXERCISE AT THIS LEVEL?: 20 MIN

## 2022-11-07 SDOH — HEALTH STABILITY: PHYSICAL HEALTH: ON AVERAGE, HOW MANY DAYS PER WEEK DO YOU ENGAGE IN MODERATE TO STRENUOUS EXERCISE (LIKE A BRISK WALK)?: 1 DAY

## 2022-11-07 ASSESSMENT — ENCOUNTER SYMPTOMS
WHEEZING: 0
VOMITING: 0
ABDOMINAL PAIN: 0
NAUSEA: 0
BLOOD IN STOOL: 0
TROUBLE SWALLOWING: 0
DIARRHEA: 1
CONSTIPATION: 0
COUGH: 0
CHEST TIGHTNESS: 0
SHORTNESS OF BREATH: 0

## 2022-11-07 ASSESSMENT — SOCIAL DETERMINANTS OF HEALTH (SDOH)
WITHIN THE LAST YEAR, HAVE TO BEEN RAPED OR FORCED TO HAVE ANY KIND OF SEXUAL ACTIVITY BY YOUR PARTNER OR EX-PARTNER?: NO
WITHIN THE LAST YEAR, HAVE YOU BEEN KICKED, HIT, SLAPPED, OR OTHERWISE PHYSICALLY HURT BY YOUR PARTNER OR EX-PARTNER?: NO
WITHIN THE LAST YEAR, HAVE YOU BEEN AFRAID OF YOUR PARTNER OR EX-PARTNER?: NO
WITHIN THE LAST YEAR, HAVE YOU BEEN HUMILIATED OR EMOTIONALLY ABUSED IN OTHER WAYS BY YOUR PARTNER OR EX-PARTNER?: NO

## 2022-11-07 NOTE — PROGRESS NOTES
Abdoul Hale  YOB: 1970    Date of Service:  11/7/2022    Chief Complaint:   Abdoul Hale is a 45 y.o. female who presents for complete physical examination. Chief Complaint   Patient presents with    Established New Doctor       HPI  Patient is a 57-year-old female who presents to establish care with new doctor. Past medical history significant for hypertension, IA, CAD, acquired hypothyroidism, prediabetic  Date of last physical: over 1 year. Hypothyroidism  Patient is currently taking Synthroid/levothyroxine 50 mcg daily. Most recent TSH (8/16/2022) was low at 0.07, with normal T4 and T3. Dose was decreased from 75 mcg to 50mcg. Symptoms include diarrhea, and weight gain. Patient reports no anxiety, cold intolerance, constipation, depressed mood, diaphoresis (hot flashes), dry skin, fatigue, hair loss, heat intolerance, hoarse voice, leg swelling, nail problem, palpitations, tremors, visual change or weight loss. Hip Pain   Endorses history of chronic hip pain. Incident onset: 6 months. There was no injury mechanism. The pain is present in the left hip. The quality of the pain is described as stabbing. The pain is at a severity of 6/10. The pain has been Intermittent since onset. Associated symptoms include a loss of motion. Pertinent negatives include no inability to bear weight, loss of sensation, muscle weakness, numbness or tingling. The symptoms are aggravated by weight bearing. Diabetes  New onset. Not taking any medication for diabetes, managed with diet and exercise. HTN/HLD/ CAD  Sees Cardiology Dr. Kimberley Pride, last seen on 11/3/2022. Takes lisinopril 5 mg daily, metoprolol tartrate 25 mg twice daily, Brilinta 90 mg twice daily, Lipitor 80 mg daily, and aspirin 81 mg daily. Patient does not check her blood pressures at home.     Right foot plantar fascitis  - tryuing to stretch the foot out , has not tried orthotics  Endorses pain in the instep of her right foot.  Chronic problem. Has tried stretching her foot out. Has not tried any orthotics. Other medical history  Recent illnesses/hospitalizations - none   Tobacco -former smoker, 1 pack/day for 10 years, quit in 1999  Alcohol - rarely   Drugs - denies   Mood - phq 2 of 0    Sexually active - yes , 2 partners lifetime   Menses - No LMP recorded. Patient is perimenopausal.  History of contraception - OCP from 10years old to 46years old   Diet - Balanced, none   Exercise - Limited due to foot   Occupation: not currently   Lives at home with 2 daughter and    Body mass index is 30.06 kg/m². Health Maintenance   Hx abnormal PAP: Yes once 18 years ago, pathology was negative, Her last pap smear was 2 years ago, negative HPV,   Last mammo: 3/3/2021  Last colonoscopy: 11/19/2019, mild diverticulosis in sigmoid colon and polyp. Internal hemorrhoids. Last tetanus booster: 2012   Last eye exam: 5 years ago   Last dental exam: 1 year ago      Patient's medications, allergies, past medical, surgical, social and family histories were reviewed and updated as appropriate.     Wt Readings from Last 3 Encounters:   11/07/22 167 lb (75.8 kg)   11/03/22 169 lb 8 oz (76.9 kg)   06/30/22 162 lb 12.8 oz (73.8 kg)     BP Readings from Last 3 Encounters:   11/07/22 131/82   11/03/22 120/76   06/30/22 126/60     No Known Allergies  Past Medical History:   Diagnosis Date    Decorative tattoo     Low back    Thyroid disease      Outpatient Medications Marked as Taking for the 11/7/22 encounter (Office Visit) with Abran Rodriguez MD   Medication Sig Dispense Refill    SYNTHROID 50 MCG tablet Take 1 tablet by mouth Daily 30 tablet 0    aspirin (ASPIRIN LOW DOSE) 81 MG EC tablet TAKE ONE TABLET BY MOUTH DAILY 30 tablet 5    atorvastatin (LIPITOR) 80 MG tablet TAKE ONE TABLET BY MOUTH ONCE NIGHTLY 90 tablet 1    ticagrelor (BRILINTA) 90 MG TABS tablet Take 1 tablet by mouth 2 times daily 60 tablet 11    metoprolol tartrate (LOPRESSOR) 25 MG tablet Take 1 tablet by mouth 2 times daily 180 tablet 3    lisinopril (PRINIVIL;ZESTRIL) 5 MG tablet Take 1 tablet by mouth daily 90 tablet 3    Levocetirizine Dihydrochloride (XYZAL ALLERGY 24HR PO) Take by mouth as needed       Patient Active Problem List   Diagnosis    Acquired hypothyroidism    Mixed hyperlipidemia    Family hx of colon cancer    Prediabetes    Essential hypertension    Family history of coronary artery disease    RIND (reversible ischemic neurologic deficit), acute ([facility] [address]    TIA involving right internal carotid artery    Obesity    Non-STEMI (non-ST elevated myocardial infarction) ([facility] [address]    Coronary artery disease involving native coronary artery of native heart with unstable angina pectoris [facility])     Health Maintenance   Topic Date Due    COVID-19 Vaccine (1) Never done    Diabetic foot exam  Never done    Diabetic microalbuminuria test  Never done    Diabetic retinal exam  Never done    Cervical cancer screen  01/23/2020    Flu vaccine (1) 08/01/2022    DTaP/Tdap/Td vaccine (8 - Td or Tdap) 11/09/2022    Breast cancer screen  03/03/2023    Depression Screen  06/30/2023    A1C test (Diabetic or Prediabetic)  07/11/2023    Lipids  07/11/2023    Colorectal Cancer Screen  11/19/2024    Shingles vaccine  Completed    Hepatitis C screen  Completed    HIV screen  Completed    Hepatitis A vaccine  Aged Out    Hib vaccine  Aged Out    Meningococcal (ACWY) vaccine  Aged Out    Pneumococcal 0-64 years Vaccine  Aged [employer] History   Administered Date(s) Administered    DTaP 1968, 06/01/1970, 07/23/1971, 08/17/1976, 03/12/1987    Hepatitis A 04/30/1999    Influenza, FLUARIX, FLULAVAL, Josefina Salter (age 10 mo+) AND AFLURIA, (age 1 y+), PF, 0.5mL 10/09/2020, 11/29/2021    MMR 07/20/1971, 08/20/1976, 03/12/1987    Polio IPV (IPOL) 1968, 08/17/1976, 04/30/1999    Td, unspecified formulation 04/30/1999    Tdap (Boostrix, Adacel) 11/09/2012    Zoster Recombinant (Shingrix) 2021, 2022       Past Surgical History:   Procedure Laterality Date    CYST REMOVAL       Family History   Problem Relation Age of Onset    Diabetes Mother     Heart Disease Mother     Diabetes Father     Diabetes Paternal Aunt     Diabetes Paternal Uncle      Social History     Socioeconomic History    Marital status:      Spouse name: Not on file    Number of children: Not on file    Years of education: Not on file    Highest education level: Not on file   Occupational History    Not on file   Tobacco Use    Smoking status: Former     Packs/day: 1.00     Years: 10.00     Pack years: 10.00     Types: Cigarettes     Start date: 1990     Quit date: 1999     Years since quittin.9    Smokeless tobacco: Never   Vaping Use    Vaping Use: Never used   Substance and Sexual Activity    Alcohol use: Not Currently     Alcohol/week: 2.0 standard drinks     Types: 2 Glasses of wine per week     Comment: Maybe a drink a couple times a month, not a drinker.     Drug use: No    Sexual activity: Yes     Partners: Male   Other Topics Concern    Not on file   Social History Narrative    Not on file     Social Determinants of Health     Financial Resource Strain: Low Risk     Difficulty of Paying Living Expenses: Not hard at all   Food Insecurity: No Food Insecurity    Worried About Running Out of Food in the Last Year: Never true    Ran Out of Food in the Last Year: Never true   Transportation Needs: Not on file   Physical Activity: Insufficiently Active    Days of Exercise per Week: 1 day    Minutes of Exercise per Session: 20 min   Stress: Not on file   Social Connections: Not on file   Intimate Partner Violence: Not At Risk    Fear of Current or Ex-Partner: No    Emotionally Abused: No    Physically Abused: No    Sexually Abused: No   Housing Stability: Not on file     No data recorded    [address]:  Review of Systems   Constitutional:  Negative for diaphoresis (hot flashes), fatigue, fever and unexpected weight change. HENT:  Negative for ear pain, hearing loss and trouble swallowing. Eyes:  Negative for visual disturbance. Respiratory:  Negative for cough, chest tightness, shortness of breath and wheezing. Cardiovascular:  Negative for chest pain, palpitations and leg swelling. Gastrointestinal:  Positive for diarrhea. Negative for abdominal pain, blood in stool, constipation, nausea and vomiting. Endocrine: Negative for cold intolerance and heat intolerance. Genitourinary:  Positive for menstrual problem (2019 - Menopause). Negative for dysuria, vaginal bleeding and vaginal discharge. Musculoskeletal:  Positive for arthralgias (hip pain). Negative for joint swelling. Skin:  Negative for rash. Neurological:  Negative for dizziness, tremors, weakness, light-headedness, numbness and headaches. Psychiatric/Behavioral:  Positive for dysphoric mood. Negative for sleep disturbance. The patient is not nervous/anxious. PhysicalExam:   /82 (Site: Left Upper Arm, Position: Sitting, Cuff Size: Large Adult)   Pulse 52   Temp 97.1 °F (36.2 °C)   Ht 5' 2.5\" (1.588 m)   Wt 167 lb (75.8 kg)   SpO2 94%   BMI 30.06 kg/m²   Physical Exam  Constitutional:       General: She is not in acute distress. Appearance: Normal appearance. She is not ill-appearing. HENT:      Head: Normocephalic. Right Ear: External ear normal.      Left Ear: External ear normal.      Nose: Nose normal.      Mouth/Throat:      Mouth: Mucous membranes are moist.      Pharynx: No posterior oropharyngeal erythema. Eyes:      Extraocular Movements: Extraocular movements intact. Conjunctiva/sclera: Conjunctivae normal.      Pupils: Pupils are equal, round, and reactive to light. Neck:      Thyroid: No thyroid mass, thyromegaly or thyroid tenderness. Cardiovascular:      Rate and Rhythm: Normal rate and regular rhythm. Pulses: Normal pulses. Heart sounds: Normal heart sounds.  No murmur heard. No gallop. Pulmonary:      Effort: Pulmonary effort is normal. No respiratory distress. Breath sounds: Normal breath sounds. No wheezing or rales. Abdominal:      General: Abdomen is flat. Bowel sounds are normal. There is no distension. Palpations: Abdomen is soft. There is no mass. Tenderness: There is no abdominal tenderness. There is no guarding. Genitourinary:     Comments: deferred  Musculoskeletal:         General: No swelling or tenderness. Normal range of motion. Cervical back: Normal range of motion. Lymphadenopathy:      Cervical: No cervical adenopathy. Skin:     General: Skin is warm. Capillary Refill: Capillary refill takes less than 2 seconds. Findings: No rash. Neurological:      General: No focal deficit present. Mental Status: She is alert. Motor: No weakness. Psychiatric:         Mood and Affect: Mood normal.         Behavior: Behavior normal.         Thought Content: Thought content normal.       Assessment/Plan:  1. Encounter to establish care with new doctor  Health Maintenance Due   Topic Date Due    COVID-19 Vaccine (1) Never done    Diabetic foot exam  Never done    Diabetic microalbuminuria test  Never done    Diabetic retinal exam  Never done    Cervical cancer screen  01/23/2020    Flu vaccine (1) 08/01/2022    DTaP/Tdap/Td vaccine (8 - Td or Tdap) 11/09/2022       Labs from 7/11/2022  Hemoglobin A1c 6.7%  CMP within normal limits, glucose 124  Lipid panel -within normal limits, total cholesterol 139, triglycerides 100, HDL 55, LDL 56  TSH low 0.02      Acquired hypothyroidism  Most recent TSH 0.07 (8/16/22), Normal T3-4 (7/11/2022). Taking levothyroxine 50 mcg qd. Asymptomatic  Recheck TSH and T4/ T3 levels  - T4, Free; Future  - T3; Future  - TSH; Future    Mixed hyperlipidemia  History of DM and CAD , taking atorvastatin (LIPITOR) 80 MG tablet  Brilinta 90 mg twice daily, and aspirin 81 mg daily.     Type 2 diabetes mellitus without complication, without long-term current use of insulin ([address]  New onset. Controlled with diet and exercise. Last hemoglobin A1c 6.7% (7/11/22). - Due for diabetic exam next visit  - Will continue to monitor yearly  -May need to start on metformin if A1c >7%    Essential hypertension  Controlled. Takes lisinopril 5 mg daily, metoprolol tartrate 25 mg twice daily,     Plantar fasciitis  - Discussed massage and stretches for plantar fasciitis   - Treatments of plantar fasciitis were discussed including stretching, otc and custom orthotics, medications, and surgery. - Discussed the with patient that long term, stretching would provide better outcomes vs. injections alone.   -Podiatry referral    - Dinorah Rollins DPM, Podiatry, [facility]    Screening for malignant neoplasm of colon  Declined at this visit    Encounter for screening mammogram for malignant neoplasm of breast  Declined at this visit    Left hip pain  No acute injury, most likely secondary to arthritis,. Exercises for the hip. X-ray left hip  - XR HIP LEFT (2-3 VIEWS); Future      Return in about 4 weeks (around 12/5/2022).       --Cal Osler, MD

## 2022-12-07 RX ORDER — LEVOTHYROXINE SODIUM 50 MCG
TABLET ORAL
Qty: 30 TABLET | Refills: 0 | Status: SHIPPED | OUTPATIENT
Start: 2022-12-07

## 2022-12-07 NOTE — TELEPHONE ENCOUNTER
Future Appointments   Date Time Provider Anjali Barrios   12/13/2022 11:00 AM [facility] ECHO ROOM [facility] Deena Furnace   12/13/2022 12:30 PM A NUCLEAR STRESS ROOM [facility] Surinder Dionicio CAI   12/22/2022 11:30 AM MD S[facility] Ro[facility] [place] - [facility]   11/9/2023  1:00 PM Edith Steen MD [facility]

## 2022-12-13 ENCOUNTER — HOSPITAL ENCOUNTER (OUTPATIENT)
Dept: CARDIOLOGY | Age: 52
Discharge: HOME | End: 2022-12-13
Payer: COMMERCIAL

## 2022-12-13 DIAGNOSIS — I25.110 CORONARY ARTERY DISEASE INVOLVING NATIVE CORONARY ARTERY OF NATIVE HEART WITH UNSTABLE ANGINA PECTORIS (HCC): ICD-10-CM

## 2022-12-13 PROCEDURE — 93306 TTE W/DOPPLER COMPLETE: CPT

## 2022-12-13 PROCEDURE — 93017 CV STRESS TEST TRACING ONLY: CPT

## 2022-12-13 PROCEDURE — A9502 TC99M TETROFOSMIN: HCPCS | Performed by: INTERNAL MEDICINE

## 2022-12-13 PROCEDURE — 78452 HT MUSCLE IMAGE SPECT MULT: CPT

## 2022-12-13 PROCEDURE — 3430000000 HC RX DIAGNOSTIC RADIOPHARMACEUTICAL: Performed by: INTERNAL MEDICINE

## 2022-12-13 RX ADMIN — TETROFOSMIN 34.2 MILLICURIE: 1.38 INJECTION, POWDER, LYOPHILIZED, FOR SOLUTION INTRAVENOUS at 12:50

## 2022-12-13 RX ADMIN — TETROFOSMIN 11 MILLICURIE: 1.38 INJECTION, POWDER, LYOPHILIZED, FOR SOLUTION INTRAVENOUS at 11:20

## 2022-12-14 ENCOUNTER — TELEPHONE (OUTPATIENT)
Dept: CARDIOLOGY CLINIC | Age: 52
End: 2022-12-14

## 2022-12-14 NOTE — TELEPHONE ENCOUNTER
----- Message from Ryanne Barboza MD sent at 12/13/2022  4:40 PM EST -----  Let patient know their stress test is normal, continue current meds, no new orders or changes at this time. Thanks.

## 2022-12-22 DIAGNOSIS — E03.9 ACQUIRED HYPOTHYROIDISM: Primary | ICD-10-CM

## 2022-12-22 LAB
T3 TOTAL: 0.82 NG/ML (ref 0.8–2)
T4 FREE: 1.1 NG/DL (ref 0.9–1.8)
TSH SERPL DL<=0.05 MIU/L-ACNC: 5.01 UIU/ML (ref 0.27–4.2)

## 2022-12-27 ENCOUNTER — TELEPHONE (OUTPATIENT)
Dept: FAMILY MEDICINE CLINIC | Age: 52
End: 2022-12-27

## 2022-12-27 DIAGNOSIS — E78.2 MIXED HYPERLIPIDEMIA: Primary | ICD-10-CM

## 2022-12-27 RX ORDER — ATORVASTATIN CALCIUM 80 MG/1
TABLET, FILM COATED ORAL
Qty: 90 TABLET | Refills: 1 | Status: CANCELLED | OUTPATIENT
Start: 2022-12-27

## 2022-12-27 RX ORDER — ATORVASTATIN CALCIUM 80 MG/1
TABLET, FILM COATED ORAL
Qty: 90 TABLET | Refills: 3 | Status: SHIPPED | OUTPATIENT
Start: 2022-12-27

## 2022-12-27 RX ORDER — LEVOTHYROXINE SODIUM 0.07 MG/1
75 TABLET ORAL DAILY
Qty: 60 TABLET | Refills: 0 | Status: SHIPPED | OUTPATIENT
Start: 2022-12-27

## 2022-12-27 NOTE — PROGRESS NOTES
TSH level was high at 5.01, T3 and T4 within normal limits    I recommend to increase the level of levothyroxine 75 mcg daily.     New prescription was sent to your pharmacy [facility]  Please schedule follow-up in clinic in 6 weeks

## 2022-12-27 NOTE — TELEPHONE ENCOUNTER
Patient stated Sylwiaedd Jacinta said they requested a refill from us last week for the following med, but that we haven't responded, and now she is out of medication. There is no record of this. Please send the refill to Rolanda Workman in [facility].       atorvastatin (LIPITOR) 80 MG tablet     11/7/2022 last ov    Future Appointments   Date Time Provider Anjali Barrios   11/9/2023  1:00 PM Tej Kumar MD [facility]

## 2022-12-28 NOTE — PROGRESS NOTES
Brief Pre-Op Note/Sedation Assessment      Henrietta Velez  1970  [id number]      [id number]  8:55 AM    Planned Procedure: Cardiac Catheterization Procedure    Post Procedure Plan: Return to same level of care    Consent: I have discussed with the patient and/or the patient representative the indication, alternatives, and the possible risks and/or complications of the planned procedure and the anesthesia methods. The patient and/or patient representative appear to understand and agree to proceed.     Chief Complaint: Chest Pain/Pressure  NSTEMI      Indications for Cath Procedure:  ACS > 24 hrs  Anginal Classification within 2 weeks:  CCS IV - Inability to perform any activity without angina or angina at rest, i.e., severe limitation  NYHA Heart Failure Class within 2 weeks: No symptoms  Is Cath Lab Visit Valve-related?: No  Surgical Risk: N/A  Functional Type: N/A    Anti- Anginal Meds within 2 weeks:   Yes: Aspirin, Non-Statin (Any) and Statin (Any)    Stress or Imaging Studies Performed (within 6 months):  None     Vital Signs:  /84   Pulse 70   Temp 97.4 °F (36.3 °C) (Oral)   Resp 16   Ht 5' 2.5\" (1.588 m)   Wt 171 lb 15.3 oz (78 kg)   SpO2 97%   BMI 30.95 kg/m²     Allergies:  No Known Allergies    Past Medical History:  Past Medical History:   Diagnosis Date    Decorative tattoo     Low back    Thyroid disease          Surgical History:  Past Surgical History:   Procedure Laterality Date    CYST REMOVAL           Medications:  Current Facility-Administered Medications   Medication Dose Route Frequency Provider Last Rate Last Admin    aspirin EC tablet 81 mg  81 mg Oral Daily Mal DomoRANJITH Piña CNP        atorvastatin (LIPITOR) tablet 80 mg  80 mg Oral Nightly RANJITH De Anda CNP        levothyroxine (SYNTHROID) tablet 75 mcg  75 mcg Oral Daily RANJITH De Anda CNP   75 mcg at 04/14/21 0557    sodium chloride flush 0.9 % injection 5-40 mL  5-40 mL Intravenous 2 times Please advise, paula system   per day Sugey Carmen, APRN - CNP        sodium chloride flush 0.9 % injection 5-40 mL  5-40 mL Intravenous PRN Sugey Carmen, APRN - CNP        0.9 % sodium chloride infusion  25 mL Intravenous PRN Sugey Carmen, APRN - CNP        promethazine (PHENERGAN) tablet 12.5 mg  12.5 mg Oral Q6H PRN Sugey Carmen, APRN - CNP        Or    ondansetron (ZOFRAN) injection 4 mg  4 mg Intravenous Q6H PRN Sugey Carmen, APRN - CNP        acetaminophen (TYLENOL) tablet 650 mg  650 mg Oral Q6H PRN Sugey Carmen, APRN - CNP   650 mg at 04/14/21 0302    Or    acetaminophen (TYLENOL) suppository 650 mg  650 mg Rectal Q6H PRN Sugey Carmen, APRN - CNP        polyethylene glycol (GLYCOLAX) packet 17 g  17 g Oral Daily PRN Sugey Carmen, APRN - CNP        0.9 % sodium chloride infusion   Intravenous Continuous Sugey Carmen, APRN - CNP 75 mL/hr at 04/14/21 0225 New Bag at 04/14/21 0225    nitroglycerin (NITRO-BID) 2 % ointment 1 inch  1 inch Topical 4 times per day Sugey Carmen, APRN - CNP   1 inch at 04/14/21 0554    lisinopril (PRINIVIL;ZESTRIL) tablet 5 mg  5 mg Oral Daily Sugey Carmen, APRN - CNP        hydrALAZINE (APRESOLINE) injection 10 mg  10 mg Intravenous Q6H PRN Sugey Carmen, APRN - CNP        heparin (porcine) injection 3,700 Units  3,700 Units Intravenous PRN Benjaman Glimpse Jailyn, DO        heparin (porcine) injection 1,900 Units  1,900 Units Intravenous PRN Ahmad A Jailyn, DO        heparin 25,000 units in dextrose 5% 250 mL (premix) infusion  7.4 mL/hr Intravenous Continuous Ahmad A Jailyn, DO 7.4 mL/hr at 04/14/21 0732 7.4 mL/hr at 04/14/21 0732    perflutren lipid microspheres (DEFINITY) injection 1.65 mg  1.5 mL Intravenous ONCE PRN Adeola Andres MD               Pre-Sedation:    Pre-Sedation Documentation and Exam:  I have personally completed a history, physical exam & review of systems for this patient (see notes).     Prior History of Anesthesia Complications:   none    Modified Mallampati:  III (soft palate, base of uvula visible)    ASA Classification:  Class 3 - A patient with severe systemic disease that limits activity but is not incapacitating      Ton Scale: Activity:  2 - Able to move 4 extremities voluntarily on command  Respiration:  2 - Able to breathe deeply and cough freely  Circulation:  2 - BP+/- 20mmHg of normal  Consciousness:  2 - Fully awake  Oxygen Saturation (color):  2 - Able to maintain oxygen saturation >92% on room air    Sedation/Anesthesia Plan:  Guard the patient's safety and welfare. Minimize physical discomfort and pain. Minimize negative psychological responses to treatment by providing sedation and analgesia and maximize the potential amnesia. Patient to meet pre-procedure discharge plan.     Medication Planned:  midazolam intravenously and fentanyl intravenously    Patient is an appropriate candidate for plan of sedation: yes      Electronically signed by Jaren Atwood MD on 4/14/2021 at 8:55 AM

## 2023-01-09 ENCOUNTER — TELEPHONE (OUTPATIENT)
Dept: FAMILY MEDICINE CLINIC | Age: 53
End: 2023-01-09

## 2023-01-09 DIAGNOSIS — E03.9 ACQUIRED HYPOTHYROIDISM: ICD-10-CM

## 2023-01-09 RX ORDER — LEVOTHYROXINE SODIUM 0.07 MG/1
75 TABLET ORAL DAILY
Qty: 60 TABLET | Refills: 0 | Status: SHIPPED | OUTPATIENT
Start: 2023-01-09

## 2023-01-09 NOTE — TELEPHONE ENCOUNTER
----- Message from Theresakeerthi Us sent at 1/9/2023  3:02 PM EST -----  Subject: Message to Provider    QUESTIONS  Information for Provider? patient would like to speak someone concerning   their 12/22/22 appointment where patient came in that day for labs but was   marked as a no show, sees provider Nasir Blackwell  ---------------------------------------------------------------------------  --------------  [address]  [phone]; OK to leave message on voicemail  ---------------------------------------------------------------------------  --------------  SCRIPT ANSWERS  Relationship to Patient?  Self

## 2023-01-09 NOTE — TELEPHONE ENCOUNTER
Please resend the Levothyroxine with the updated dose to the pharmacy it did not go through the last time. I spoke with Quintin Harper The appt  12/22/2022 date was a mix up she thought that she was only coming in for lab work that day and not to be seen due to she was just in and saw Dr Quinton Browne and was not expecting to come back for 6 month.

## 2023-03-08 DIAGNOSIS — E03.9 ACQUIRED HYPOTHYROIDISM: Primary | ICD-10-CM

## 2023-03-08 DIAGNOSIS — E03.9 ACQUIRED HYPOTHYROIDISM: ICD-10-CM

## 2023-03-08 RX ORDER — LEVOTHYROXINE SODIUM 75 MCG
TABLET ORAL
Qty: 30 TABLET | Refills: 0 | Status: SHIPPED | OUTPATIENT
Start: 2023-03-08

## 2023-03-08 NOTE — TELEPHONE ENCOUNTER
11/7/2022     Future Appointments   Date Time Provider Anjali Barrios   11/9/2023  1:00 PM Citlaly Dasilva MD [facility]

## 2023-03-15 NOTE — TELEPHONE ENCOUNTER
Jameel Miguel is requesting refill(s) synthroid  Last OV 6/30/22 (pertaining to medication)  LR 6/9/22 (per medication requested)  Next office visit scheduled or attempted Yes   If no, reason:  Pt was advised she still has not gotten her labs done to determine her TSH, she says she will go today normal sinus rhythm

## 2023-04-06 ENCOUNTER — HOSPITAL ENCOUNTER (OUTPATIENT)
Dept: GENERAL RADIOLOGY | Age: 53
Discharge: HOME | End: 2023-04-06
Payer: COMMERCIAL

## 2023-04-06 DIAGNOSIS — M25.552 LEFT HIP PAIN: ICD-10-CM

## 2023-04-06 DIAGNOSIS — E03.9 ACQUIRED HYPOTHYROIDISM: ICD-10-CM

## 2023-04-06 PROCEDURE — 73502 X-RAY EXAM HIP UNI 2-3 VIEWS: CPT

## 2023-04-06 RX ORDER — LEVOTHYROXINE SODIUM 75 MCG
TABLET ORAL
Qty: 30 TABLET | Refills: 0 | Status: SHIPPED | OUTPATIENT
Start: 2023-04-06

## 2023-04-06 NOTE — TELEPHONE ENCOUNTER
Future Appointments   Date Time Provider Anjali Bariros   11/9/2023  1:00 PM Mel Medina MD [facility]     LOV 11/7/2022

## 2023-04-07 LAB
T3 SERPL-MCNC: 1 NG/ML (ref 0.8–2)
T4 FREE SERPL-MCNC: 1.6 NG/DL (ref 0.9–1.8)
TSH SERPL DL<=0.005 MIU/L-ACNC: 0.02 UIU/ML (ref 0.27–4.2)

## 2023-04-07 RX ORDER — LISINOPRIL 5 MG/1
TABLET ORAL
Qty: 30 TABLET | Refills: 5 | Status: SHIPPED | OUTPATIENT
Start: 2023-04-07

## 2023-04-07 RX ORDER — TICAGRELOR 90 MG/1
TABLET ORAL
Qty: 60 TABLET | Refills: 11 | Status: SHIPPED | OUTPATIENT
Start: 2023-04-07

## 2023-04-07 NOTE — TELEPHONE ENCOUNTER
Future Appointments   Date Time Provider Anjali Barrios   6/9/2023  2:00 PM MD [facility] [place] - D[facility]   11/9/2023  1:00 PM Sujatha Smyth MD [facility]

## 2023-04-20 ENCOUNTER — OFFICE VISIT (OUTPATIENT)
Dept: ORTHOPEDIC SURGERY | Age: 53
End: 2023-04-20

## 2023-04-20 VITALS — WEIGHT: 167 LBS | BODY MASS INDEX: 30.73 KG/M2 | HEIGHT: 62 IN

## 2023-04-20 DIAGNOSIS — M16.12 PRIMARY OSTEOARTHRITIS OF LEFT HIP: ICD-10-CM

## 2023-04-20 DIAGNOSIS — Z01.818 PREOP TESTING: Primary | ICD-10-CM

## 2023-04-20 DIAGNOSIS — Z01.818 PREOP TESTING: ICD-10-CM

## 2023-04-20 NOTE — PROGRESS NOTES
They can try glucosamine or chondroitin. They should also ice frequently and avoid activities that make their hip hurt. Cortisone injections and Synvisc injections are also options when medicine has failed. We finally discussed surgical options including arthroscopic debridement versus hip replacement. Often the arthritis is too far gone for an arthroscopic debridement and pain relief will be short term. Their ultimate solution will be a hip replacement when they are ready for it. They should put it off until they can no longer stand the pain and when nothing else has worked. Conservative measures have failed. She is not interested in cortisone injections. I think she is an appropriate candidate for surgery due to her ongoing symptoms and dysfunction despite conservative measures. The procedure would be Left Anterior  09574 Total Hip Arthroplasty    Perioperative considerations include:  Cardiac and Pre-operative clearance from medical subspecialty. We reviewed the risks, benefits, alternatives of this approach. We discussed risks including, but not limited to, bleeding, pain, infection, scarring, damage to the neurovascular structures, blood clots, pulmonary embolus, stiffness, implant instability or loosening, implant failure, incomplete relief of pain, and incomplete return of function. We also reviewed the surgical details, expected recovery, and rehabilitation (6-9 months). She expressed understanding and will undergo preoperative medical evaluation and optimization. Electronically signed by Warden Olivia MD on 4/20/2023 at 4:14 PM  This dictation was generated by voice recognition computer software. Although all attempts are made to edit the dictation for accuracy, there may be errors in the transcription that are not intended.

## 2023-04-21 LAB
ABO + RH BLD: NORMAL
ALBUMIN SERPL-MCNC: 4.9 G/DL (ref 3.4–5)
ANION GAP SERPL CALCULATED.3IONS-SCNC: 9 MMOL/L (ref 3–16)
APTT BLD: 35.7 SEC (ref 22.7–35.9)
BACTERIA UR CULT: NORMAL
BASOPHILS # BLD: 0 K/UL (ref 0–0.2)
BASOPHILS NFR BLD: 0.4 %
BILIRUB UR QL STRIP.AUTO: NEGATIVE
BLD GP AB SCN SERPL QL: NORMAL
BUN SERPL-MCNC: 12 MG/DL (ref 7–20)
CALCIUM SERPL-MCNC: 10.3 MG/DL (ref 8.3–10.6)
CHLORIDE SERPL-SCNC: 105 MMOL/L (ref 99–110)
CLARITY UR: CLEAR
CO2 SERPL-SCNC: 26 MMOL/L (ref 21–32)
COLOR UR: YELLOW
CREAT SERPL-MCNC: 0.9 MG/DL (ref 0.6–1.1)
DEPRECATED RDW RBC AUTO: 14.8 % (ref 12.4–15.4)
EOSINOPHIL # BLD: 0.1 K/UL (ref 0–0.6)
EOSINOPHIL NFR BLD: 2.6 %
EST. AVERAGE GLUCOSE BLD GHB EST-MCNC: 139.9 MG/DL
GFR SERPLBLD CREATININE-BSD FMLA CKD-EPI: >60 ML/MIN/{1.73_M2}
GLUCOSE SERPL-MCNC: 117 MG/DL (ref 70–99)
GLUCOSE UR STRIP.AUTO-MCNC: NEGATIVE MG/DL
HBA1C MFR BLD: 6.5 %
HCT VFR BLD AUTO: 38.1 % (ref 36–48)
HGB BLD-MCNC: 12.8 G/DL (ref 12–16)
HGB UR QL STRIP.AUTO: NEGATIVE
INR PPP: 0.91 (ref 0.84–1.16)
KETONES UR STRIP.AUTO-MCNC: NEGATIVE MG/DL
LEUKOCYTE ESTERASE UR QL STRIP.AUTO: NEGATIVE
LYMPHOCYTES # BLD: 1.6 K/UL (ref 1–5.1)
LYMPHOCYTES NFR BLD: 29.2 %
MCH RBC QN AUTO: 33 PG (ref 26–34)
MCHC RBC AUTO-ENTMCNC: 33.5 G/DL (ref 31–36)
MCV RBC AUTO: 98.7 FL (ref 80–100)
MONOCYTES # BLD: 0.4 K/UL (ref 0–1.3)
MONOCYTES NFR BLD: 7.9 %
NEUTROPHILS # BLD: 3.3 K/UL (ref 1.7–7.7)
NEUTROPHILS NFR BLD: 59.9 %
NITRITE UR QL STRIP.AUTO: NEGATIVE
PH UR STRIP.AUTO: 6 [PH] (ref 5–8)
PLATELET # BLD AUTO: 235 K/UL (ref 135–450)
PMV BLD AUTO: 9.4 FL (ref 5–10.5)
POTASSIUM SERPL-SCNC: 5.1 MMOL/L (ref 3.5–5.1)
PROT UR STRIP.AUTO-MCNC: NEGATIVE MG/DL
PROTHROMBIN TIME: 12.3 SEC (ref 11.5–14.8)
RBC # BLD AUTO: 3.86 M/UL (ref 4–5.2)
SODIUM SERPL-SCNC: 140 MMOL/L (ref 136–145)
SP GR UR STRIP.AUTO: 1.01 (ref 1–1.03)
TRANSFERRIN SERPL-MCNC: 292 MG/DL (ref 200–360)
UA DIPSTICK W REFLEX MICRO PNL UR: NORMAL
URN SPEC COLLECT METH UR: NORMAL
UROBILINOGEN UR STRIP-ACNC: 0.2 E.U./DL
WBC # BLD AUTO: 5.4 K/UL (ref 4–11)

## 2023-04-23 LAB — MRSA SPEC QL CULT: NORMAL

## 2023-04-24 ENCOUNTER — TELEPHONE (OUTPATIENT)
Dept: ORTHOPEDIC SURGERY | Age: 53
End: 2023-04-24

## 2023-04-26 ENCOUNTER — TELEPHONE (OUTPATIENT)
Dept: ORTHOPEDIC SURGERY | Age: 53
End: 2023-04-26

## 2023-05-01 ENCOUNTER — HOSPITAL ENCOUNTER (OUTPATIENT)
Dept: PHYSICAL THERAPY | Age: 53
Setting detail: THERAPIES SERIES
Discharge: HOME | End: 2023-05-01
Payer: COMMERCIAL

## 2023-05-01 PROCEDURE — 97161 PT EVAL LOW COMPLEX 20 MIN: CPT | Performed by: PHYSICAL THERAPIST

## 2023-05-01 PROCEDURE — 97110 THERAPEUTIC EXERCISES: CPT | Performed by: PHYSICAL THERAPIST

## 2023-05-01 NOTE — FLOWSHEET NOTE
[facility] [facility], [address] [place]  Phone: [phone]  Fax [phone]    Physical Therapy Daily Treatment Note  Date:  2023    Patient Name:  Brenna Leventhal    :  1970  MRN: [id number]    Date of Patient follow up with Physician: sx scheduled 23    Referring Physician: Alma Euceda MD                                                     Evaluation Date: 2023                                             Date of Referral:2023     Insurance: [facility] requires Gardenia Castro MD appt: scheduled: post op appt TBS     Medical Diagnosis:  M16.12 (ICD-10-CM) - Primary osteoarthritis of left hip        Treatment Diagnosis:  L hip pain M25.552           Precautions/ Contra-indications: hx of MI, TIA, BMI 30+  Latex Allergy:  [x]NO      []YES  Preferred Language for Healthcare:   [x]English       []other:       Visit # Insurance Allowable Auth Required   In-person 1 MN [x]  Yes []  No    Telehealth   []  Yes []  No    Total        Pain level:  7/10     SUBJECTIVE:  See eval    OBJECTIVE: See eval  Observation:   Test measurements:        Exercises/Interventions:     Therapeutic Ex Sets/reps Notes        Seated HS stretch 3x30 sec HEP   Seated calf stretch 3x30 sec HEP   Seated QS BLEs 10 sec 10x HEP   Supine heelslide 1x10 HEP   Isometric abdominal 10 sec 10x HEP   LAQ  1x10 HEP   minisquats 1x10 HEP                                 Pt education on restrictions following sx, AD, PT before leaving the hospital, help at home, attending outpatient PT x15'         Manual Intervention                                   NMR re-education                                                      Therapeutic Exercise and NMR EXR  [x] (76823) Provided verbal/tactile cueing for activities related to strengthening, flexibility, endurance, ROM for improvements in LE, proximal hip, and core control with self care, mobility,

## 2023-05-01 NOTE — THERAPY EVALUATION
[facility] [address] [facility], [address]  Phone: [phone]  Fax [phone]     Cordelia Quick    Dear Dr. Darcie Pyle ,    We had the pleasure of evaluating the following patient for physical therapy services at [address] [place] [address]. A summary of our findings can be found in the initial assessment below. This includes our plan of care. If you have any questions or concerns regarding these findings, please do not hesitate to contact me at the office phone number checked above. Thank you for the referral.       Physician Signature:_______________________________Date:__________________  By signing above (or electronic signature), therapists plan is approved by physician    Patient: Ashley Wise   : 1970   MRN: [id number]    Referring Physician: Temi Agosto MD         Evaluation Date: 2023        Date of Referral:2023    Insurance: [email or web address] requires Marielos Castro MD appt: scheduled: post op appt TBS    Medical Diagnosis:  M16.12 (ICD-10-CM) - Primary osteoarthritis of left hip      Treatment Diagnosis:  L hip pain M25.552        Precautions/ Contra-indications: hx of MI, TIA, BMI 30+  Latex Allergy:  [x]NO      []YES    Preferred Language for Healthcare:   [x]English       []other:    SUBJECTIVE: Patient stated complaint: Pain in her hip and loss of flexibility.     Relevant Medical History:previous TIA/MI  Functional Disability Index: LEFS     Pain Scale: 10  Easing factors: rest  Provocative factors: walking     Type: []Constant   []Intermittent  []Radiating []Localized []other:     Numbness/Tingling: none[]    Occupation/School: none    Living Status/Prior Level of Function: Independent with ADLs and IADLs, lives in one story home with full basement with grown children    OBJECTIVE:               Gait: (include devices/WB status) No AD, minimal antalgia after session

## 2023-05-02 ENCOUNTER — TELEPHONE (OUTPATIENT)
Dept: CARDIOLOGY CLINIC | Age: 53
End: 2023-05-02

## 2023-05-02 NOTE — TELEPHONE ENCOUNTER
CARDIAC CLEARANCE REQUEST    What type of procedure are you having: hip replacement left    Are you taking any blood thinners: BRILINTA 90 MG TABS tablet  aspirin (ASPIRIN LOW DOSE) 81 MG EC tablet When should pt hold      When is your procedure scheduled for: 5/22/23    What physician is performing your procedure:  Dr Susie Beltran  Phone Number:    Fax number to send the letter: [phone]

## 2023-05-02 NOTE — TELEPHONE ENCOUNTER
Patient is at acceptable (intermediate) cv risk for planned procedure/surgery. Ok to hold aspirin for 5 days before surgery, and should resume it 1-2 days after surgery. Thank you.

## 2023-05-02 NOTE — TELEPHONE ENCOUNTER
Last 11/03/2022      Assessment  1. Coronary artery disease involving native coronary artery of native heart with unstable angina pectoris ([facility] [address]    2. Non-STEMI (non-ST elevated myocardial infarction) ([facility] [address] [facility]    3. TIA involving right internal carotid artery    4. Essential hypertension    5. Mixed hyperlipidemia           Plan    1. Stress Myoview test to assess left arm heaviness/pain, anginal equivalent, abnl ekg   2. Echo test to assess heart function, lt arm pain, abnl ekg  3. Call [phone] to schedule echo and stress test  4.  Follow up in 1 year    Next Appt 11/9/2023

## 2023-05-03 ENCOUNTER — TELEPHONE (OUTPATIENT)
Dept: ORTHOPEDIC SURGERY | Age: 53
End: 2023-05-03

## 2023-05-04 ENCOUNTER — OFFICE VISIT (OUTPATIENT)
Dept: FAMILY MEDICINE CLINIC | Age: 53
End: 2023-05-04
Payer: COMMERCIAL

## 2023-05-04 VITALS
SYSTOLIC BLOOD PRESSURE: 118 MMHG | DIASTOLIC BLOOD PRESSURE: 76 MMHG | RESPIRATION RATE: 16 BRPM | OXYGEN SATURATION: 98 % | TEMPERATURE: 97.3 F | WEIGHT: 166.4 LBS | HEART RATE: 54 BPM | BODY MASS INDEX: 30.43 KG/M2

## 2023-05-04 DIAGNOSIS — E11.9 TYPE 2 DIABETES MELLITUS WITHOUT COMPLICATION, WITHOUT LONG-TERM CURRENT USE OF INSULIN (HCC): ICD-10-CM

## 2023-05-04 DIAGNOSIS — I10 ESSENTIAL HYPERTENSION: ICD-10-CM

## 2023-05-04 DIAGNOSIS — E03.9 ACQUIRED HYPOTHYROIDISM: Chronic | ICD-10-CM

## 2023-05-04 DIAGNOSIS — Z01.818 PREOPERATIVE EXAMINATION: Primary | ICD-10-CM

## 2023-05-04 DIAGNOSIS — M16.12 PRIMARY OSTEOARTHRITIS OF LEFT HIP: ICD-10-CM

## 2023-05-04 DIAGNOSIS — I25.110 CORONARY ARTERY DISEASE INVOLVING NATIVE CORONARY ARTERY OF NATIVE HEART WITH UNSTABLE ANGINA PECTORIS (HCC): ICD-10-CM

## 2023-05-04 PROCEDURE — 93000 ELECTROCARDIOGRAM COMPLETE: CPT | Performed by: STUDENT IN AN ORGANIZED HEALTH CARE EDUCATION/TRAINING PROGRAM

## 2023-05-04 PROCEDURE — 3044F HG A1C LEVEL LT 7.0%: CPT | Performed by: STUDENT IN AN ORGANIZED HEALTH CARE EDUCATION/TRAINING PROGRAM

## 2023-05-04 PROCEDURE — 3078F DIAST BP <80 MM HG: CPT | Performed by: STUDENT IN AN ORGANIZED HEALTH CARE EDUCATION/TRAINING PROGRAM

## 2023-05-04 PROCEDURE — 3074F SYST BP LT 130 MM HG: CPT | Performed by: STUDENT IN AN ORGANIZED HEALTH CARE EDUCATION/TRAINING PROGRAM

## 2023-05-04 PROCEDURE — 99214 OFFICE O/P EST MOD 30 MIN: CPT | Performed by: STUDENT IN AN ORGANIZED HEALTH CARE EDUCATION/TRAINING PROGRAM

## 2023-05-04 SDOH — ECONOMIC STABILITY: FOOD INSECURITY: WITHIN THE PAST 12 MONTHS, THE FOOD YOU BOUGHT JUST DIDN'T LAST AND YOU DIDN'T HAVE MONEY TO GET MORE.: NEVER TRUE

## 2023-05-04 SDOH — ECONOMIC STABILITY: INCOME INSECURITY: HOW HARD IS IT FOR YOU TO PAY FOR THE VERY BASICS LIKE FOOD, HOUSING, MEDICAL CARE, AND HEATING?: NOT HARD AT ALL

## 2023-05-04 SDOH — ECONOMIC STABILITY: FOOD INSECURITY: WITHIN THE PAST 12 MONTHS, YOU WORRIED THAT YOUR FOOD WOULD RUN OUT BEFORE YOU GOT MONEY TO BUY MORE.: NEVER TRUE

## 2023-05-04 SDOH — ECONOMIC STABILITY: HOUSING INSECURITY
IN THE LAST 12 MONTHS, WAS THERE A TIME WHEN YOU DID NOT HAVE A STEADY PLACE TO SLEEP OR SLEPT IN A SHELTER (INCLUDING NOW)?: NO

## 2023-05-04 ASSESSMENT — PATIENT HEALTH QUESTIONNAIRE - PHQ9
SUM OF ALL RESPONSES TO PHQ QUESTIONS 1-9: 0
SUM OF ALL RESPONSES TO PHQ QUESTIONS 1-9: 0
SUM OF ALL RESPONSES TO PHQ9 QUESTIONS 1 & 2: 0
1. LITTLE INTEREST OR PLEASURE IN DOING THINGS: 0
SUM OF ALL RESPONSES TO PHQ QUESTIONS 1-9: 0
2. FEELING DOWN, DEPRESSED OR HOPELESS: 0
SUM OF ALL RESPONSES TO PHQ QUESTIONS 1-9: 0

## 2023-05-04 ASSESSMENT — ANXIETY QUESTIONNAIRES
2. NOT BEING ABLE TO STOP OR CONTROL WORRYING: 0
6. BECOMING EASILY ANNOYED OR IRRITABLE: 0
1. FEELING NERVOUS, ANXIOUS, OR ON EDGE: 0
GAD7 TOTAL SCORE: 0
5. BEING SO RESTLESS THAT IT IS HARD TO SIT STILL: 0
3. WORRYING TOO MUCH ABOUT DIFFERENT THINGS: 0
4. TROUBLE RELAXING: 0
7. FEELING AFRAID AS IF SOMETHING AWFUL MIGHT HAPPEN: 0
IF YOU CHECKED OFF ANY PROBLEMS ON THIS QUESTIONNAIRE, HOW DIFFICULT HAVE THESE PROBLEMS MADE IT FOR YOU TO DO YOUR WORK, TAKE CARE OF THINGS AT HOME, OR GET ALONG WITH OTHER PEOPLE: NOT DIFFICULT AT ALL

## 2023-05-04 NOTE — TELEPHONE ENCOUNTER
On Cardiac clearance the Brilinta was not addressed. How long should pt hold and if so for how long.  TY

## 2023-05-04 NOTE — TELEPHONE ENCOUNTER
Called pt. Relayed medication instructions regarding Lachelle Cristopher and to hold it 5 days prior and to resume it 1-2 days after ortho procedure. She verbalized understanding. Called Dr. Flory Pitt office to relay this information as well.  VM was left for staff member Abilio Quinones with medication instructions

## 2023-05-06 DIAGNOSIS — E03.9 ACQUIRED HYPOTHYROIDISM: ICD-10-CM

## 2023-05-08 RX ORDER — LEVOTHYROXINE SODIUM 0.07 MG/1
75 TABLET ORAL DAILY
Qty: 30 TABLET | Refills: 2 | Status: SHIPPED | OUTPATIENT
Start: 2023-05-08 | End: 2023-08-06

## 2023-05-08 RX ORDER — LEVOTHYROXINE SODIUM 75 MCG
TABLET ORAL
Qty: 30 TABLET | Refills: 2 | Status: SHIPPED | OUTPATIENT
Start: 2023-05-08 | End: 2023-05-08

## 2023-05-08 RX ORDER — LEVOTHYROXINE SODIUM 0.07 MG/1
75 TABLET ORAL DAILY
Qty: 30 TABLET | Refills: 2 | Status: SHIPPED | OUTPATIENT
Start: 2023-05-08 | End: 2023-05-08

## 2023-05-08 NOTE — TELEPHONE ENCOUNTER
5/4/2023        Future Appointments   Date Time Provider Anjali Barrios   6/8/2023  3:45 PM Davie Saxena MD [facility]   6/9/2023  2:00 PM MD [facility] [place] - DYRAI   11/9/2023  1:00 PM Ирина uRbio MD [facility]

## 2023-05-18 ENCOUNTER — TELEPHONE (OUTPATIENT)
Dept: ORTHOPEDIC SURGERY | Age: 53
End: 2023-05-18

## 2023-05-18 NOTE — TELEPHONE ENCOUNTER
Spoke with patient and advised per Tang's direction in the message below.      FLACO [place]  Caller: Unspecified (Today,  1:50 PM)  No.  It is unlikely patient will need blood and the process of donating blood and ensuring it is safe to get back to patient's is an established practice

## 2023-05-18 NOTE — TELEPHONE ENCOUNTER
General Question     Subject: LEFT HIP  Patient and /or Facility Request: Vivian Daily  Contact Number:  [phone]    PT CALLING WANTING TO KNOW IF HER DAUGHTER CAN GIVE HER BLOOD FOR HER SURGERY    PT HAS SURGERY WITH DOCTOR LENORA 5/22/23 ON HER LEFT HIP     PLEASE CALL THE PATIENT BACK AT THE ABOVE NUMBER

## 2023-05-19 ENCOUNTER — TELEPHONE (OUTPATIENT)
Dept: ORTHOPEDIC SURGERY | Age: 53
End: 2023-05-19

## 2023-05-21 ENCOUNTER — ANESTHESIA EVENT (OUTPATIENT)
Dept: OPERATING ROOM | Age: 53
End: 2023-05-21
Payer: COMMERCIAL

## 2023-05-22 ENCOUNTER — APPOINTMENT (OUTPATIENT)
Dept: GENERAL RADIOLOGY | Age: 53
End: 2023-05-22
Attending: ORTHOPAEDIC SURGERY
Payer: COMMERCIAL

## 2023-05-22 ENCOUNTER — HOSPITAL ENCOUNTER (OUTPATIENT)
Age: 53
Discharge: HOME | End: 2023-05-22
Attending: ORTHOPAEDIC SURGERY | Admitting: ORTHOPAEDIC SURGERY
Payer: COMMERCIAL

## 2023-05-22 ENCOUNTER — ANESTHESIA (OUTPATIENT)
Dept: OPERATING ROOM | Age: 53
End: 2023-05-22
Payer: COMMERCIAL

## 2023-05-22 VITALS
BODY MASS INDEX: 30.91 KG/M2 | TEMPERATURE: 98.5 F | HEIGHT: 62 IN | HEART RATE: 68 BPM | RESPIRATION RATE: 16 BRPM | DIASTOLIC BLOOD PRESSURE: 54 MMHG | SYSTOLIC BLOOD PRESSURE: 113 MMHG | WEIGHT: 168 LBS | OXYGEN SATURATION: 98 %

## 2023-05-22 DIAGNOSIS — M16.12 PRIMARY OSTEOARTHRITIS OF LEFT HIP: Primary | ICD-10-CM

## 2023-05-22 LAB
ABO + RH BLD: NORMAL
BLD GP AB SCN SERPL QL: NORMAL
GLUCOSE BLD-MCNC: 126 MG/DL (ref 70–99)
GLUCOSE BLD-MCNC: 136 MG/DL (ref 70–99)
PERFORMED ON: ABNORMAL
PERFORMED ON: ABNORMAL

## 2023-05-22 PROCEDURE — 6360000002 HC RX W HCPCS: Performed by: NURSE ANESTHETIST, CERTIFIED REGISTERED

## 2023-05-22 PROCEDURE — C1776 JOINT DEVICE (IMPLANTABLE): HCPCS | Performed by: ORTHOPAEDIC SURGERY

## 2023-05-22 PROCEDURE — 7100000000 HC PACU RECOVERY - FIRST 15 MIN: Performed by: ORTHOPAEDIC SURGERY

## 2023-05-22 PROCEDURE — 3600000015 HC SURGERY LEVEL 5 ADDTL 15MIN: Performed by: ORTHOPAEDIC SURGERY

## 2023-05-22 PROCEDURE — A4217 STERILE WATER/SALINE, 500 ML: HCPCS | Performed by: ORTHOPAEDIC SURGERY

## 2023-05-22 PROCEDURE — 97110 THERAPEUTIC EXERCISES: CPT

## 2023-05-22 PROCEDURE — C9290 INJ, BUPIVACAINE LIPOSOME: HCPCS | Performed by: ORTHOPAEDIC SURGERY

## 2023-05-22 PROCEDURE — 73502 X-RAY EXAM HIP UNI 2-3 VIEWS: CPT

## 2023-05-22 PROCEDURE — 6370000000 HC RX 637 (ALT 250 FOR IP): Performed by: ORTHOPAEDIC SURGERY

## 2023-05-22 PROCEDURE — 7100000011 HC PHASE II RECOVERY - ADDTL 15 MIN: Performed by: ORTHOPAEDIC SURGERY

## 2023-05-22 PROCEDURE — 7100000001 HC PACU RECOVERY - ADDTL 15 MIN: Performed by: ORTHOPAEDIC SURGERY

## 2023-05-22 PROCEDURE — 97165 OT EVAL LOW COMPLEX 30 MIN: CPT

## 2023-05-22 PROCEDURE — 97161 PT EVAL LOW COMPLEX 20 MIN: CPT

## 2023-05-22 PROCEDURE — 86901 BLOOD TYPING SEROLOGIC RH(D): CPT

## 2023-05-22 PROCEDURE — 3700000001 HC ADD 15 MINUTES (ANESTHESIA): Performed by: ORTHOPAEDIC SURGERY

## 2023-05-22 PROCEDURE — 97535 SELF CARE MNGMENT TRAINING: CPT

## 2023-05-22 PROCEDURE — 2709999900 HC NON-CHARGEABLE SUPPLY: Performed by: ORTHOPAEDIC SURGERY

## 2023-05-22 PROCEDURE — 2580000003 HC RX 258: Performed by: PHYSICIAN ASSISTANT

## 2023-05-22 PROCEDURE — 3209999900 FLUORO FOR SURGICAL PROCEDURES

## 2023-05-22 PROCEDURE — 2720000010 HC SURG SUPPLY STERILE: Performed by: ORTHOPAEDIC SURGERY

## 2023-05-22 PROCEDURE — 2580000003 HC RX 258: Performed by: ANESTHESIOLOGY

## 2023-05-22 PROCEDURE — 86900 BLOOD TYPING SEROLOGIC ABO: CPT

## 2023-05-22 PROCEDURE — 2500000003 HC RX 250 WO HCPCS

## 2023-05-22 PROCEDURE — 86850 RBC ANTIBODY SCREEN: CPT

## 2023-05-22 PROCEDURE — 2580000003 HC RX 258: Performed by: ORTHOPAEDIC SURGERY

## 2023-05-22 PROCEDURE — 3700000000 HC ANESTHESIA ATTENDED CARE: Performed by: ORTHOPAEDIC SURGERY

## 2023-05-22 PROCEDURE — 73501 X-RAY EXAM HIP UNI 1 VIEW: CPT

## 2023-05-22 PROCEDURE — 6360000002 HC RX W HCPCS: Performed by: ORTHOPAEDIC SURGERY

## 2023-05-22 PROCEDURE — 2500000003 HC RX 250 WO HCPCS: Performed by: NURSE ANESTHETIST, CERTIFIED REGISTERED

## 2023-05-22 PROCEDURE — 2500000003 HC RX 250 WO HCPCS: Performed by: PHYSICIAN ASSISTANT

## 2023-05-22 PROCEDURE — 7100000010 HC PHASE II RECOVERY - FIRST 15 MIN: Performed by: ORTHOPAEDIC SURGERY

## 2023-05-22 PROCEDURE — 2500000003 HC RX 250 WO HCPCS: Performed by: ORTHOPAEDIC SURGERY

## 2023-05-22 PROCEDURE — 3600000005 HC SURGERY LEVEL 5 BASE: Performed by: ORTHOPAEDIC SURGERY

## 2023-05-22 PROCEDURE — 97116 GAIT TRAINING THERAPY: CPT

## 2023-05-22 PROCEDURE — 6360000002 HC RX W HCPCS: Performed by: ANESTHESIOLOGY

## 2023-05-22 PROCEDURE — 6360000002 HC RX W HCPCS: Performed by: PHYSICIAN ASSISTANT

## 2023-05-22 DEVICE — BIOLOX® DELTA, CERAMIC FEMORAL HEAD, M, Ø 32/0, TAPER 12/14
Type: IMPLANTABLE DEVICE | Site: HIP | Status: FUNCTIONAL
Brand: BIOLOX® DELTA

## 2023-05-22 DEVICE — IMPLANTABLE DEVICE
Type: IMPLANTABLE DEVICE | Site: HIP | Status: FUNCTIONAL
Brand: AVENIR COMPLETE™

## 2023-05-22 DEVICE — IMPLANTABLE DEVICE
Type: IMPLANTABLE DEVICE | Site: HIP | Status: FUNCTIONAL
Brand: G7® ACETABULAR SYSTEM

## 2023-05-22 DEVICE — IMPLANTABLE DEVICE
Type: IMPLANTABLE DEVICE | Site: HIP | Status: FUNCTIONAL
Brand: G7® VIVACIT-E®

## 2023-05-22 RX ORDER — SODIUM CHLORIDE 0.9 % (FLUSH) 0.9 %
5-40 SYRINGE (ML) INJECTION PRN
Status: DISCONTINUED | OUTPATIENT
Start: 2023-05-22 | End: 2023-05-22 | Stop reason: HOSPADM

## 2023-05-22 RX ORDER — OXYCODONE HYDROCHLORIDE 5 MG/1
5 TABLET ORAL EVERY 30 MIN PRN
Status: DISCONTINUED | OUTPATIENT
Start: 2023-05-22 | End: 2023-05-22 | Stop reason: HOSPADM

## 2023-05-22 RX ORDER — SODIUM CHLORIDE 0.9 % (FLUSH) 0.9 %
5-40 SYRINGE (ML) INJECTION PRN
Status: CANCELLED | OUTPATIENT
Start: 2023-05-22

## 2023-05-22 RX ORDER — OXYCODONE HYDROCHLORIDE 5 MG/1
5 TABLET ORAL EVERY 4 HOURS PRN
Status: CANCELLED | OUTPATIENT
Start: 2023-05-22

## 2023-05-22 RX ORDER — KETOROLAC TROMETHAMINE 30 MG/ML
15 INJECTION, SOLUTION INTRAMUSCULAR; INTRAVENOUS
Status: DISCONTINUED | OUTPATIENT
Start: 2023-05-22 | End: 2023-05-22 | Stop reason: HOSPADM

## 2023-05-22 RX ORDER — MORPHINE SULFATE 2 MG/ML
2 INJECTION, SOLUTION INTRAMUSCULAR; INTRAVENOUS
Status: CANCELLED | OUTPATIENT
Start: 2023-05-22

## 2023-05-22 RX ORDER — ONDANSETRON 2 MG/ML
INJECTION INTRAMUSCULAR; INTRAVENOUS PRN
Status: DISCONTINUED | OUTPATIENT
Start: 2023-05-22 | End: 2023-05-22 | Stop reason: SDUPTHER

## 2023-05-22 RX ORDER — OXYCODONE HYDROCHLORIDE 5 MG/1
10 TABLET ORAL EVERY 4 HOURS PRN
Status: CANCELLED | OUTPATIENT
Start: 2023-05-22

## 2023-05-22 RX ORDER — ONDANSETRON 4 MG/1
4 TABLET, FILM COATED ORAL 3 TIMES DAILY PRN
Qty: 15 TABLET | Refills: 0 | Status: SHIPPED | OUTPATIENT
Start: 2023-05-22

## 2023-05-22 RX ORDER — SODIUM CHLORIDE, SODIUM LACTATE, POTASSIUM CHLORIDE, CALCIUM CHLORIDE 600; 310; 30; 20 MG/100ML; MG/100ML; MG/100ML; MG/100ML
INJECTION, SOLUTION INTRAVENOUS CONTINUOUS
Status: DISCONTINUED | OUTPATIENT
Start: 2023-05-22 | End: 2023-05-22 | Stop reason: HOSPADM

## 2023-05-22 RX ORDER — SODIUM CHLORIDE 0.9 % (FLUSH) 0.9 %
5-40 SYRINGE (ML) INJECTION EVERY 12 HOURS SCHEDULED
Status: DISCONTINUED | OUTPATIENT
Start: 2023-05-22 | End: 2023-05-22 | Stop reason: HOSPADM

## 2023-05-22 RX ORDER — SODIUM CHLORIDE 9 MG/ML
INJECTION, SOLUTION INTRAVENOUS PRN
Status: DISCONTINUED | OUTPATIENT
Start: 2023-05-22 | End: 2023-05-22 | Stop reason: HOSPADM

## 2023-05-22 RX ORDER — POLYETHYLENE GLYCOL 3350 17 G/17G
17 POWDER, FOR SOLUTION ORAL DAILY
Status: CANCELLED | OUTPATIENT
Start: 2023-05-22

## 2023-05-22 RX ORDER — SENNOSIDES 8.8 MG/5ML
5 LIQUID ORAL 2 TIMES DAILY PRN
Status: CANCELLED | OUTPATIENT
Start: 2023-05-22

## 2023-05-22 RX ORDER — VANCOMYCIN HYDROCHLORIDE 1 G/20ML
INJECTION, POWDER, LYOPHILIZED, FOR SOLUTION INTRAVENOUS PRN
Status: DISCONTINUED | OUTPATIENT
Start: 2023-05-22 | End: 2023-05-22 | Stop reason: ALTCHOICE

## 2023-05-22 RX ORDER — HYDROMORPHONE HCL 110MG/55ML
PATIENT CONTROLLED ANALGESIA SYRINGE INTRAVENOUS PRN
Status: DISCONTINUED | OUTPATIENT
Start: 2023-05-22 | End: 2023-05-22 | Stop reason: SDUPTHER

## 2023-05-22 RX ORDER — LIDOCAINE HYDROCHLORIDE 10 MG/ML
0.3 INJECTION, SOLUTION EPIDURAL; INFILTRATION; INTRACAUDAL; PERINEURAL
Status: DISCONTINUED | OUTPATIENT
Start: 2023-05-22 | End: 2023-05-22 | Stop reason: HOSPADM

## 2023-05-22 RX ORDER — ONDANSETRON 2 MG/ML
4 INJECTION INTRAMUSCULAR; INTRAVENOUS
Status: COMPLETED | OUTPATIENT
Start: 2023-05-22 | End: 2023-05-22

## 2023-05-22 RX ORDER — MELOXICAM 15 MG/1
15 TABLET ORAL DAILY PRN
Qty: 90 TABLET | Refills: 0 | Status: SHIPPED | OUTPATIENT
Start: 2023-05-22

## 2023-05-22 RX ORDER — MAGNESIUM HYDROXIDE 1200 MG/15ML
LIQUID ORAL CONTINUOUS PRN
Status: COMPLETED | OUTPATIENT
Start: 2023-05-22 | End: 2023-05-22

## 2023-05-22 RX ORDER — DEXAMETHASONE SODIUM PHOSPHATE 4 MG/ML
INJECTION, SOLUTION INTRA-ARTICULAR; INTRALESIONAL; INTRAMUSCULAR; INTRAVENOUS; SOFT TISSUE PRN
Status: DISCONTINUED | OUTPATIENT
Start: 2023-05-22 | End: 2023-05-22 | Stop reason: SDUPTHER

## 2023-05-22 RX ORDER — MAGNESIUM SULFATE HEPTAHYDRATE 500 MG/ML
INJECTION, SOLUTION INTRAMUSCULAR; INTRAVENOUS PRN
Status: DISCONTINUED | OUTPATIENT
Start: 2023-05-22 | End: 2023-05-22 | Stop reason: SDUPTHER

## 2023-05-22 RX ORDER — MEPERIDINE HYDROCHLORIDE 50 MG/ML
12.5 INJECTION INTRAMUSCULAR; INTRAVENOUS; SUBCUTANEOUS EVERY 5 MIN PRN
Status: DISCONTINUED | OUTPATIENT
Start: 2023-05-22 | End: 2023-05-22 | Stop reason: HOSPADM

## 2023-05-22 RX ORDER — CYCLOBENZAPRINE HCL 10 MG
10 TABLET ORAL 3 TIMES DAILY PRN
Qty: 30 TABLET | Refills: 0 | Status: SHIPPED | OUTPATIENT
Start: 2023-05-22 | End: 2023-06-01

## 2023-05-22 RX ORDER — MELOXICAM 7.5 MG/1
7.5 TABLET ORAL DAILY
Status: CANCELLED | OUTPATIENT
Start: 2023-05-22 | End: 2023-05-25

## 2023-05-22 RX ORDER — SODIUM CHLORIDE 9 MG/ML
INJECTION, SOLUTION INTRAVENOUS PRN
Status: CANCELLED | OUTPATIENT
Start: 2023-05-22

## 2023-05-22 RX ORDER — MIDAZOLAM HYDROCHLORIDE 1 MG/ML
INJECTION INTRAMUSCULAR; INTRAVENOUS PRN
Status: DISCONTINUED | OUTPATIENT
Start: 2023-05-22 | End: 2023-05-22 | Stop reason: SDUPTHER

## 2023-05-22 RX ORDER — ASPIRIN 81 MG/1
81 TABLET ORAL 2 TIMES DAILY
Qty: 60 TABLET | Refills: 0 | Status: SHIPPED | OUTPATIENT
Start: 2023-05-23

## 2023-05-22 RX ORDER — LIDOCAINE HYDROCHLORIDE 20 MG/ML
INJECTION, SOLUTION INFILTRATION; PERINEURAL PRN
Status: DISCONTINUED | OUTPATIENT
Start: 2023-05-22 | End: 2023-05-22 | Stop reason: SDUPTHER

## 2023-05-22 RX ORDER — ACETAMINOPHEN 500 MG
1000 TABLET ORAL ONCE
Status: COMPLETED | OUTPATIENT
Start: 2023-05-22 | End: 2023-05-22

## 2023-05-22 RX ORDER — MIDAZOLAM HYDROCHLORIDE 1 MG/ML
INJECTION INTRAMUSCULAR; INTRAVENOUS PRN
Status: DISCONTINUED | OUTPATIENT
Start: 2023-05-22 | End: 2023-05-22

## 2023-05-22 RX ORDER — ONDANSETRON 8 MG/1
4 TABLET, ORALLY DISINTEGRATING ORAL EVERY 8 HOURS PRN
Status: CANCELLED | OUTPATIENT
Start: 2023-05-22

## 2023-05-22 RX ORDER — CEPHALEXIN 500 MG/1
500 CAPSULE ORAL 4 TIMES DAILY
Qty: 4 CAPSULE | Refills: 0 | Status: SHIPPED | OUTPATIENT
Start: 2023-05-22

## 2023-05-22 RX ORDER — METHYLPREDNISOLONE 4 MG/1
TABLET ORAL
Qty: 1 KIT | Refills: 0 | Status: SHIPPED | OUTPATIENT
Start: 2023-05-22

## 2023-05-22 RX ORDER — DROPERIDOL 2.5 MG/ML
0.62 INJECTION, SOLUTION INTRAMUSCULAR; INTRAVENOUS
Status: COMPLETED | OUTPATIENT
Start: 2023-05-22 | End: 2023-05-22

## 2023-05-22 RX ORDER — ACETAMINOPHEN 325 MG/1
650 TABLET ORAL EVERY 6 HOURS
Status: CANCELLED | OUTPATIENT
Start: 2023-05-22

## 2023-05-22 RX ORDER — OXYCODONE HYDROCHLORIDE 5 MG/1
5 TABLET ORAL EVERY 6 HOURS PRN
Qty: 28 TABLET | Refills: 0 | Status: SHIPPED | OUTPATIENT
Start: 2023-05-22 | End: 2023-05-29

## 2023-05-22 RX ORDER — ROCURONIUM BROMIDE 10 MG/ML
INJECTION, SOLUTION INTRAVENOUS PRN
Status: DISCONTINUED | OUTPATIENT
Start: 2023-05-22 | End: 2023-05-22 | Stop reason: SDUPTHER

## 2023-05-22 RX ORDER — CEFAZOLIN SODIUM IN 0.9 % NACL 2 G/100 ML
2000 PLASTIC BAG, INJECTION (ML) INTRAVENOUS EVERY 8 HOURS
Status: CANCELLED | OUTPATIENT
Start: 2023-05-22 | End: 2023-05-23

## 2023-05-22 RX ORDER — SODIUM CHLORIDE 0.9 % (FLUSH) 0.9 %
5-40 SYRINGE (ML) INJECTION EVERY 12 HOURS SCHEDULED
Status: CANCELLED | OUTPATIENT
Start: 2023-05-22

## 2023-05-22 RX ORDER — MORPHINE SULFATE 4 MG/ML
4 INJECTION, SOLUTION INTRAMUSCULAR; INTRAVENOUS
Status: CANCELLED | OUTPATIENT
Start: 2023-05-22

## 2023-05-22 RX ORDER — SODIUM CHLORIDE, SODIUM LACTATE, POTASSIUM CHLORIDE, CALCIUM CHLORIDE 600; 310; 30; 20 MG/100ML; MG/100ML; MG/100ML; MG/100ML
INJECTION, SOLUTION INTRAVENOUS CONTINUOUS
Status: CANCELLED | OUTPATIENT
Start: 2023-05-22

## 2023-05-22 RX ORDER — CELECOXIB 100 MG/1
200 CAPSULE ORAL ONCE
Status: COMPLETED | OUTPATIENT
Start: 2023-05-22 | End: 2023-05-22

## 2023-05-22 RX ORDER — CEFAZOLIN SODIUM IN 0.9 % NACL 2 G/100 ML
2000 PLASTIC BAG, INJECTION (ML) INTRAVENOUS
Status: COMPLETED | OUTPATIENT
Start: 2023-05-22 | End: 2023-05-22

## 2023-05-22 RX ORDER — ONDANSETRON 2 MG/ML
4 INJECTION INTRAMUSCULAR; INTRAVENOUS EVERY 6 HOURS PRN
Status: CANCELLED | OUTPATIENT
Start: 2023-05-22

## 2023-05-22 RX ORDER — PROPOFOL 10 MG/ML
INJECTION, EMULSION INTRAVENOUS PRN
Status: DISCONTINUED | OUTPATIENT
Start: 2023-05-22 | End: 2023-05-22 | Stop reason: SDUPTHER

## 2023-05-22 RX ORDER — FENTANYL CITRATE 50 UG/ML
INJECTION, SOLUTION INTRAMUSCULAR; INTRAVENOUS PRN
Status: DISCONTINUED | OUTPATIENT
Start: 2023-05-22 | End: 2023-05-22 | Stop reason: SDUPTHER

## 2023-05-22 RX ORDER — MAGNESIUM SULFATE IN WATER 40 MG/ML
2000 INJECTION, SOLUTION INTRAVENOUS ONCE
Status: DISCONTINUED | OUTPATIENT
Start: 2023-05-22 | End: 2023-05-22 | Stop reason: HOSPADM

## 2023-05-22 RX ORDER — ACETAMINOPHEN 500 MG
1000 TABLET ORAL
Status: DISCONTINUED | OUTPATIENT
Start: 2023-05-22 | End: 2023-05-22 | Stop reason: HOSPADM

## 2023-05-22 RX ADMIN — ACETAMINOPHEN 1000 MG: 500 TABLET ORAL at 07:22

## 2023-05-22 RX ADMIN — HYDROMORPHONE HYDROCHLORIDE 0.4 MG: 2 INJECTION, SOLUTION INTRAMUSCULAR; INTRAVENOUS; SUBCUTANEOUS at 08:09

## 2023-05-22 RX ADMIN — ROCURONIUM BROMIDE 20 MG: 10 SOLUTION INTRAVENOUS at 08:09

## 2023-05-22 RX ADMIN — ONDANSETRON 4 MG: 2 INJECTION INTRAMUSCULAR; INTRAVENOUS at 11:04

## 2023-05-22 RX ADMIN — Medication 2000 MG: at 07:32

## 2023-05-22 RX ADMIN — ONDANSETRON 4 MG: 2 INJECTION INTRAMUSCULAR; INTRAVENOUS at 07:36

## 2023-05-22 RX ADMIN — ROCURONIUM BROMIDE 10 MG: 10 SOLUTION INTRAVENOUS at 08:36

## 2023-05-22 RX ADMIN — DROPERIDOL 0.62 MG: 2.5 INJECTION, SOLUTION INTRAMUSCULAR; INTRAVENOUS at 13:17

## 2023-05-22 RX ADMIN — PROPOFOL 200 MG: 10 INJECTION, EMULSION INTRAVENOUS at 08:14

## 2023-05-22 RX ADMIN — TRANEXAMIC ACID 1000 MG: 100 INJECTION, SOLUTION INTRAVENOUS at 08:12

## 2023-05-22 RX ADMIN — DEXAMETHASONE SODIUM PHOSPHATE 8 MG: 4 INJECTION, SOLUTION INTRAMUSCULAR; INTRAVENOUS at 07:36

## 2023-05-22 RX ADMIN — SUGAMMADEX 200 MG: 100 INJECTION, SOLUTION INTRAVENOUS at 09:01

## 2023-05-22 RX ADMIN — HYDROMORPHONE HYDROCHLORIDE 0.5 MG: 1 INJECTION, SOLUTION INTRAMUSCULAR; INTRAVENOUS; SUBCUTANEOUS at 09:53

## 2023-05-22 RX ADMIN — CELECOXIB 200 MG: 100 CAPSULE ORAL at 07:22

## 2023-05-22 RX ADMIN — HYDROMORPHONE HYDROCHLORIDE 0.5 MG: 1 INJECTION, SOLUTION INTRAMUSCULAR; INTRAVENOUS; SUBCUTANEOUS at 10:59

## 2023-05-22 RX ADMIN — HYDROMORPHONE HYDROCHLORIDE 0.4 MG: 2 INJECTION, SOLUTION INTRAMUSCULAR; INTRAVENOUS; SUBCUTANEOUS at 09:03

## 2023-05-22 RX ADMIN — MIDAZOLAM HYDROCHLORIDE 2 MG: 2 INJECTION, SOLUTION INTRAMUSCULAR; INTRAVENOUS at 07:31

## 2023-05-22 RX ADMIN — FENTANYL CITRATE 100 MCG: 50 INJECTION, SOLUTION INTRAMUSCULAR; INTRAVENOUS at 07:36

## 2023-05-22 RX ADMIN — SODIUM CHLORIDE, SODIUM LACTATE, POTASSIUM CHLORIDE, AND CALCIUM CHLORIDE: .6; .31; .03; .02 INJECTION, SOLUTION INTRAVENOUS at 07:32

## 2023-05-22 RX ADMIN — MAGNESIUM SULFATE HEPTAHYDRATE 2 G: 500 INJECTION, SOLUTION INTRAMUSCULAR; INTRAVENOUS at 07:41

## 2023-05-22 RX ADMIN — LIDOCAINE HYDROCHLORIDE 100 MG: 20 INJECTION, SOLUTION INFILTRATION; PERINEURAL at 07:36

## 2023-05-22 RX ADMIN — ROCURONIUM BROMIDE 50 MG: 10 SOLUTION INTRAVENOUS at 07:36

## 2023-05-22 RX ADMIN — HYDROMORPHONE HYDROCHLORIDE 0.4 MG: 2 INJECTION, SOLUTION INTRAMUSCULAR; INTRAVENOUS; SUBCUTANEOUS at 08:20

## 2023-05-22 ASSESSMENT — PAIN DESCRIPTION - ORIENTATION
ORIENTATION: LEFT
ORIENTATION: RIGHT

## 2023-05-22 ASSESSMENT — PAIN DESCRIPTION - LOCATION
LOCATION: HIP
LOCATION: HIP

## 2023-05-22 ASSESSMENT — PAIN - FUNCTIONAL ASSESSMENT: PAIN_FUNCTIONAL_ASSESSMENT: 0-10

## 2023-05-22 ASSESSMENT — PAIN SCALES - GENERAL
PAINLEVEL_OUTOF10: 6
PAINLEVEL_OUTOF10: 8

## 2023-05-22 ASSESSMENT — LIFESTYLE VARIABLES: SMOKING_STATUS: 0

## 2023-05-22 NOTE — ANESTHESIA POSTPROCEDURE EVALUATION
Department of Anesthesiology  Postprocedure Note    Patient: Ines Brenner  MRN: [id number]  YOB: 1970  Date of evaluation: 5/22/2023      Procedure Summary     Date: 05/22/23 Room / Location: [facility] [address] [place]    Anesthesia Start: 0732 Anesthesia Stop: 5484    Procedure: LEFT TOTAL HIP ARTHROPLASTY MINIMALLY INVASIVE DIRECT ANTERIOR (Left: Hip) Diagnosis:       Primary osteoarthritis of left hip      (Primary osteoarthritis of left hip)    Surgeons: Seth Hebert MD Responsible Provider: Thea Soria MD    Anesthesia Type: general ASA Status: 3          Anesthesia Type: No value filed.     Ton Phase I: Ton Score: 8    Ton Phase II:        Anesthesia Post Evaluation    Patient location during evaluation: PACU  Patient participation: complete - patient participated  Level of consciousness: awake and alert  Airway patency: patent  Nausea & Vomiting: nausea and vomiting (improved after droperidol)  Complications: no  Cardiovascular status: hemodynamically stable  Respiratory status: acceptable, room air and spontaneous ventilation  Hydration status: stable  Comments: --------------------            05/22/23               1235     --------------------   BP:     (!) 112/59   Pulse:      55       Resp:                Temp:                SpO2:      94%      -------------------- 40 wk male born to a 37 y/o  mother via vacuum-assisted vaginal delivery. No significant maternal or prenatal hx. Maternal blood type A+. Prenatal labs negative, non-reactive and immune. GBS negative on . AROM at 07:45 (<18 hours) with heavy meconium. Baby was born vigorous and crying spontaneously. W/D/S/S. APGARS 8/9. EOS 0.05    Mom is planning on breast and formula feeding, desires hep B vaccination and circumcision.    Physical Exam:  Skin: WWP, pink  Head: +caput succedaneum, AFOF, no dysmorphic features  Ears: no pits or tags, no deformity  Nose: nares patent  Mouth: no cleft, palate intact  Trunk: No crepitus, lungs CTAB with normal work of breathing  Cardiac: S1S2 regular rate, no murmur  Abdomen: Soft, nontender, not distended, no masses  Umbilical cord: 3 vessel  Extremities: FROM, negative ortolani/mendes bilaterally  Spine/anus: No sacral dimple, anus patent  Genitalia: normal male external genitalia  Neuro: +grasp +kayleen +suck

## 2023-05-22 NOTE — H&P
Update History & Physical     The patient's History and Physical of 5/4/2023 was reviewed with the patient and I examined the patient. There was no change. The surgical site was confirmed by the patient and me. Plan: The risks, benefits, expected outcome, and alternative to the recommended procedure have been discussed with the patient / family. Patient understands and wants to proceed with the procedure.       Electronically signed by Edwina Key MD on 5/22/2023 at 6:59 AM

## 2023-05-22 NOTE — DISCHARGE INSTRUCTIONS
Total Hip & Bipolar Replacement  Discharge Instructions    To prevent Clot formation, you have been placed on the following medication:  Take aspirin 81 mg twice a day starting day after surgery   Restart Brilinta on 5/24  Surgical Site Care:  Keep incision clean and dry. May shower on post-op day #3 with waterproof dressing on. Change to new waterproof dressing between 5-7 days. Physical Therapy:  Weight Bearing Status:     Weight bearing as tolerated  Precautions  Per Physical Therapy handout  Pain Medications  You were given oxycodone (Oxycontin, Oxyir)  Wean off pain medications as you deem appropriate as long as pain is under control  Be sure to drink plenty of fluids (recommend water) while taking narcotic pain medications to prevent constipation  You may take an over the counter laxative or stool softener as needed to prevent/treat constipation as well, we recommend Senokot S OTC. We recommend that you consider taking these medications the entire time you are taking pain medication. Cold packs/Ice packs/Machine  May be used as much as necessary to reduce swelling/inflammation/soreness  Be sure to have a barrier (cloth, clothing, towel) between your skin/incision and the ice pack to prevent frostbite  Contact office if  Increased redness, swelling, drainage of any kind, and/or pain to surgery site. As well as new onset fevers and or chills. These could signify an infection. Calf or thigh tenderness to touch as well as increased swelling or redness. This could signify a clot formation. Numbness or tingling to an area around the incision site or below the incision site (toes). Any rash appears, increased  or new onset nausea/vomiting occur. This may indicate a reaction to a medication. Phone # [phone]  Follow up with Dr. James Payan or Uday Rodriguez PA-C at scheduled appointment time. Please continue to use your Incentive Spirometer at home every hour while awake.      *** Please contact Leyla Beard

## 2023-05-22 NOTE — ANESTHESIA PRE PROCEDURE
Department of Anesthesiology  Preprocedure Note       Name:  Anabell Payer   Age:  45 y.o.  :  1970                                          MRN:  [id number]         Date:  2023      Surgeon: Rasta Eaton): Leena Gambino MD    Procedure: Procedure(s):  LEFT TOTAL HIP ARTHROPLASTY MINIMALLY INVASIVE DIRECT ANTERIOR  SHERRELL BIOMET    Medications prior to admission:   Prior to Admission medications    Medication Sig Start Date End Date Taking? Authorizing Provider   levothyroxine (SYNTHROID) 75 MCG tablet Take 1 tablet by mouth daily Take 1 daily Monday - Friday, Skip on weekends  Patient taking differently: Take 1 tablet by mouth daily Take 1 daily Monday - Friday, Skip on weekends Takes in am 23  Sergo Caba MD   mupirocin (BACTROBAN) 2 % ointment Apply twice daily to each nare for 5 days prior to surgical procedure 23   Leena Gambino MD   BRILINTA 90 MG TABS tablet TAKE ONE TABLET BY MOUTH TWICE A DAY 23   Kike Gore MD   metoprolol tartrate (LOPRESSOR) 25 MG tablet TAKE ONE TABLET BY MOUTH TWICE A DAY 23   Kike Gore MD   lisinopril (PRINIVIL;ZESTRIL) 5 MG tablet TAKE ONE TABLET BY MOUTH DAILY  Patient taking differently:  In am 23   Kike Gore MD   atorvastatin (LIPITOR) 80 MG tablet TAKE ONE TABLET BY MOUTH ONCE NIGHTLY 22   Sergo Caba MD   aspirin (ASPIRIN LOW DOSE) 81 MG EC tablet TAKE ONE TABLET BY MOUTH DAILY 22   Concha Walker,    Levocetirizine Dihydrochloride (XYZAL ALLERGY 24HR PO) Take by mouth as needed    Historical Provider, MD       Current medications:    Current Facility-Administered Medications   Medication Dose Route Frequency Provider Last Rate Last Admin    tranexamic acid (CYKLOKAPRON) 1,000 mg in sodium chloride 0.9 % 100 mL IVPB  1,000 mg IntraVENous [email or web address]        Followed by   Jackie Schmidt tranexamic acid (CYKLOKAPRON) 1,000 mg in sodium chloride 0.9 % 100 mL IVPB  1,000 mg IntraVENous On Call to

## 2023-05-22 NOTE — OP NOTE
Orthopaedic Surgery  Operative Report      Patient Name:  Anabell Payer  Patient :  1970  MRN: [id number]    Date: 23     Pre-operative Diagnosis:   M16.12 Left hip primary osteoarthritis    Post-operative Diagnosis:    Same    Procedure: LEFT  77611 Total Hip Arthroplasty, direct anterior    Surgeon:  Surgeon(s) and Role:     * Leena Gambino MD - Primary    Assistant: Circulator: Bryanna Baptiste RN  Perfusionist: Catrachito Martinez  Surgical Assistant: Kathleen Cardona; Demetri Rajan  Radiology Technologist: Alexis Melendez  Scrub Person First: Samuel Frank    Anesthesia: General endotracheal anesthesia and Intraoperative local infiltration - Exparel/marcaine solution    Estimated blood loss: 150    Specimens: * No specimens in log *    Complications: None    Drains: None    Condition: Stable    Implants:   Implant Name Type Inv. Item Serial No.  Lot No. LRB No. Used Action   LINER ACET C NEUT 32 MM HIP VIVACIT-E G7 - [id number]  LINER ACET C NEUT 32 MM HIP VIVACIT-E G7  [employer]ER[employer]LL [employer] ETEX DORI- [id number] Left 1 Implanted   SHELL G7 OSSEOTI 3 HOLE 48MM C - [id number]  SHELL G7 OSSEOTI 3 HOLE 48MM C  [employer]- [id number] Left 1 Implanted   BIOLOX DELTA FEM HEAD 32MM +0MM - [id number]  BIOLOX DELTA FEM HEAD 32MM +0MM  [employer]-WD [id number] Left 1 Implanted   AVENIR COMPLETE HIGH OFFSET COLLARLESS SIZE 1 - [id number]  Avenir Complete High Offset Collarless Size 1  [employer]- [id number] Left 1 Implanted       Findings:   1. End stage OA    Indications: The patient has been battling left hip pain for months to years. Pain has gotten worse recently. Patient has failed all preoperative conservative treatment options. The activities of daily living have been affected quite a bit. Patient wanted to regain mobility and be active as possible.   Patient understood the risk benefits and alternatives in detail and wanted to proceed with the above

## 2023-05-22 NOTE — PROGRESS NOTES
Occupational Therapy Evaluation and Treatment  Facility/Department: [facility] OR  Occupational Therapy Initial Assessment/ Treatment/ Discharge  1x only    Name: Patricia Harris  : 1970  MRN: [id number]  Date of Service: 2023       Patient Diagnosis(es): The encounter diagnosis was Primary osteoarthritis of left hip. Past Medical History:  has a past medical history of CAD (coronary artery disease), Cerebral artery occlusion with cerebral infarction ([facility] [address] [address] Decorative tattoo, Diabetes mellitus ([facility] [address] Hyperlipidemia, Hypertension, PONV (postoperative nausea and vomiting), Primary osteoarthritis of left hip, and Thyroid disease. Past Surgical History:  has a past surgical history that includes cyst removal.       Barriers: None    AM-PAC score  AM-PAC Inpatient Daily Activity Raw Score: 18 (23 1528)  AM-PAC Inpatient ADL T-Scale Score : 38.66 (23 1528)  ADL Inpatient CMS 0-100% Score: 46.65 (23 1528)  ADL Inpatient CMS G-Code Modifier : CK (23 152)    Assessment:   Pt is a 45 y.o. yo female  ad[facility] to [facility] s/p Left, Total Hip Arthroplasty. Pt is currently requiring SBA/CGA for UB ADLs, SBA/CGA for LB ADLs,  SBA/CGA for bed mobility, SBA/CGA for transfers, and  SBA/CGA for ambulation with Rolling Walker. Pt would benefit from continued skilled occupational therapy to address these deficits, increasing safety and independence with ADL and functional mobility. Recommend Home with 24H supv/assist at discharge.   DME Recommendations: N/A      Restrictions:   Weight Bearing Restrictions: Full Weight Bearing as Tolerated  [] Right Upper Extremity  [] Left Upper Extremity   [] Right Lower Extremity  [x] Left Lower Extremity     Required Braces/Orthotics: no braces required   [] Right  [] Left  Positional Restrictions: No Straight Leg Raise, No Straight Leg Raise    Subjective:   Pain     Pain: Yes   Location: L  hip  Rating: \"just feels weird\"  Pain Intervention: [] Denies need [] RN
Occupational Therapy/Physical Therapy  OT/PT orders received. Attempted to see pt 2x. Per PACU, pt is not ready to participate d/t somnolence and nausea. OT/PT will reattempt later.   Cirilo Valenciaal OT  Matt Postin PT
Patient admitted to [facility] room 9 in preparation for surgery, VSS. Consent confirmed. IV inserted into right hand, LR infusing. Belongings on pacu cart. NPO since solids since 5/21 6pm and fluids today 0630.
Patient and family educated regarding post op care and new medications and stated understanding. New medications to be collected from outpatient pharmacy.
Patient escorted to vehicle by staff
Pt brought to PACU. Report obtained from OR RN and anesthesia. Pt placed on monitor and O2 at 4L. Oral airway in place, writer att bedside for monittoring. FSBS 136.   Patient is still not awake
discharge: None      PT Diagnosis: impaired functional mobility post-op L TRENA   Complexity: LOW      Subjective:   Pt in bed upon arrival, RN cleared for therapy. Pt pleasant and agreeable to PT eval.  Pain     Pain: No  Location: N/A  Ratin/10    Discharge Environment:    Pt. Lives with spouse  Home environment:  one story home  Steps to enter first floor:  2 steps to enter and no handrail  Bathroom: walk in shower  Equipment owned: RW, rollator, [address], and raised toilet    Prior Level of Function:  Pt. Able to drive: Yes  Pt Fully independent with ADLs: Yes  Pt. Required assistance from family for: Independent PTA  Pt. independent for transfers and gait and walked with No Device  History of falls No    Objective:  Vitals: BP: 114/65, HR: 65, SpO2: 96%  /72 sitting EOB   Strength/ROM:  RLE: Within Normal Limits, 5/5   LLE: Not formally assessed due to surgical limb, but appears functional    Sensations:  RLE: Intact  LLE: Abnormal: reports her R LE feels numb but is still able to feel light touch     Functional Mobility:  Pt performed bed mobility with SBA/CGA and cues to maintain TRENA precautions  Pt performed transfers with SBA/CGA and use of Rolling Walker and cues for hand placement   Pt able to ambulate 50+50 ft with SBA/CGA and Rolling Walker with initial cues for walker management and sequencing. Pt able to perform 2 curb steps with SBA/CGA and Rolling Walker with cues for sequencing. Therapeutic Exercises:  Pt performed 10 reps of BLE exercises including: ankle pumps, quad sets, glute sets, heel slides, SAQ  Pt provided written HEP    Disease Specific Education:   Pt educated on weight bearing status, post-op precautions, appropriate DME, and safe mobility with AD.  Pt verbalized understanding    Pt educated and provided written instruction on safety with car transfers with use of assistive device:  [x] Pt verbalized understanding of appropriate technique, maintaining any ordered precautions for

## 2023-05-23 ENCOUNTER — TELEPHONE (OUTPATIENT)
Dept: ORTHOPEDIC SURGERY | Age: 53
End: 2023-05-23

## 2023-05-24 NOTE — TELEPHONE ENCOUNTER
Spoke with pt, doing pretty well. States pain is minimal    Incision status: No drainage or redness    Edema/Swelling/Teds: Minimal swelling. Pain level and status: Pain is really manageable    Use of pain medications: Using tylenol. Blood thinner: Back on brilinita and ASA- home dosage. Bowels: Hasn't taken anything    Home Care Agency active: NA    Outpatient therapy: NA    Do you have all of your medications: Yes    Changes in medications: no    Instructed pt to call Nurse Navigator or surgeon's office with any questions or concerns.      Follow up appointments:    Future Appointments   Date Time Provider Anjali Barrios   6/8/2023  3:45 PM Edwina Key MD [facility]   6/9/2023  2:00 PM MD [facility]   11/9/2023  1:00 PM Travis Sevilla MD [facility]

## 2023-05-31 ENCOUNTER — TELEPHONE (OUTPATIENT)
Dept: ORTHOPEDIC SURGERY | Age: 53
End: 2023-05-31

## 2023-06-01 NOTE — TELEPHONE ENCOUNTER
Nurse Navigator called patient for one final follow up: Things are going good, pain is still manageable, using tylenol. Discussed she needed pain meds at night, states she has been walking a lot. Discussed with pt recommendations to not walk too much, or overdo it right after surgery per Dr. Solis Arevalo. Pt agreeable and states she will back off on the walking a bit. Pt has no questions or concerns. Signed off from pt. Instructed pt to call RN or Surgeon's office with any issues or concerns.     Stephanie Bradley  Orthopedic Nurse Navigator  Phone number: [phone]

## 2023-06-08 ENCOUNTER — OFFICE VISIT (OUTPATIENT)
Dept: ORTHOPEDIC SURGERY | Age: 53
End: 2023-06-08

## 2023-06-08 VITALS — WEIGHT: 168 LBS | BODY MASS INDEX: 30.91 KG/M2 | HEIGHT: 62 IN

## 2023-06-08 DIAGNOSIS — Z96.642 S/P TOTAL LEFT HIP ARTHROPLASTY: Primary | ICD-10-CM

## 2023-06-08 PROCEDURE — 99024 POSTOP FOLLOW-UP VISIT: CPT | Performed by: ORTHOPAEDIC SURGERY

## 2023-06-08 NOTE — PROGRESS NOTES
Dr Felecia Marie      Date /Time 6/8/2023       4:16 PM EDT  Name Chucho Webster             1970   Location  [address] [place] [address]  MRN [id number]                Chief Complaint   Patient presents with    Follow-up     1st PO Left TRENA  05/22/2023        History of Present Illness      Chucho Webster is a 45 y.o. female is here for post-op visit after RIGHT  38914 Total Hip Arthroplasty    Patient presents the office today for postoperative visit for above-mentioned surgery. Patient doing well. Patient denies any fever, chills, or drainage. Pain controlled. Physical Exam    Based off 1997 Exam Criteria    Ht 5' 2\" (1.575 m)   Wt 168 lb (76.2 kg)   LMP 09/09/2019 (Approximate)   BMI 30.73 kg/m²      Constitutional:       General: He is not in acute distress. Appearance: Normal appearance. RIGHT Hip: incision clean, intact, healing appropriately. No surrounding  erythema or fluctuance. Neuro intact distal. No evidence of DVT. Imaging       Right Hip: [facility] [place]  Radiographs: AP pelvis, lateral, and false profile were ordered today and reviewed. They demonstrate a total hip arthroplasty in good position. No evidence of loosening or periprosthetic fracture. Assessment and Plan    Walter Loyola was seen today for follow-up. Diagnoses and all orders for this visit:    S/P total left hip arthroplasty  -     XR HIP LEFT (2-3 VIEWS); [facility]  -     [facility] Physical Therapy [facility]        Start physical therapy next week. Can start to wean away from walker use, transition down to a cane or nothing. Work with physical therapy to help wean off gait assist devices. Return back to clinic in 3 months. Electronically signed by Felecia Marie MD on 6/8/2023 at 4:16 PM  This dictation was generated by voice recognition computer software.   Although all attempts are made to edit the dictation for accuracy, there may be errors in the transcription

## 2023-06-22 ENCOUNTER — HOSPITAL ENCOUNTER (OUTPATIENT)
Dept: PHYSICAL THERAPY | Age: 53
Setting detail: THERAPIES SERIES
Discharge: HOME | End: 2023-06-22
Payer: COMMERCIAL

## 2023-06-22 PROCEDURE — 97112 NEUROMUSCULAR REEDUCATION: CPT

## 2023-06-22 PROCEDURE — 97110 THERAPEUTIC EXERCISES: CPT

## 2023-06-22 NOTE — FLOWSHEET NOTE
723 [facility] and [address], [address] [place] [address] [address]  Phone: [phone]   Fax:     [phone]      Physical Therapy Treatment Note/ Progress Report:     Date:  2023    Patient Name:  Vale Calderon    :  1970  MRN: [id number]  Restrictions/Precautions:    Medical/Treatment Diagnosis Information:  Diagnosis: S/P total left hip arthroplasty Z96.642  Treatment Diagnosis: Left hip pain M25. 902  Insurance/Certification information:  PT Insurance Information: [address]  Physician Information:   An Sosa  Has the plan of care been signed (Y/N):        []  Yes  [x]  No     Date of Patient follow up with Physician: 23    Is this a Progress Report:     []  Yes  [x]  No      If Yes:  Date Range for reporting period:  Beginnin23 ------------ Endin23    Progress report will be due (10 Rx or 30 days whichever is less):      Recertification will be due (POC Duration  / 90 days whichever is less): 23      Visit # Insurance Allowable Auth Required   In Person 2 14 (until ) []  Yes     []  No    [facility] 0  []  Yes     []  No    Total 2       FOTO Score: LEFS 58%     Date assessed:  23      Latex Allergy:  [x]NO      []YES  Preferred Language for Healthcare:   [x]English       []other:    Pain level:  1-4/10     SUBJECTIVE:  Pt 4 wks PO. Reports hasn't been able to do ex much since she is moving.     OBJECTIVE: See eval  Observation:   Test measurements:      RESTRICTIONS/PRECAUTIONS:  s/p R TRENA 23 direct anterior     Exercises/Interventions:   Therapeutic Ex (50035) Sets/sec Reps Notes/CUES HEP   Supine TA 5s 2x10 vc    Supine ball sqz 5s 2x10 vc    Supine isometric clam 5s 2x10 vc    Supine TA heel slides  2x10 vc    LAQ 3# 3x10 vc    HS curl  HR  Leg press  Step up     65#  2 in 3x10  3x10  3x10  2x10 Blue, vc    Weight shifting FW/BW, side to side  Step and hold  1min ea  x10 vc    Bike ROM 6

## 2023-06-28 ENCOUNTER — HOSPITAL ENCOUNTER (OUTPATIENT)
Dept: PHYSICAL THERAPY | Age: 53
Setting detail: THERAPIES SERIES
Discharge: HOME | End: 2023-06-28
Payer: COMMERCIAL

## 2023-07-05 ENCOUNTER — HOSPITAL ENCOUNTER (OUTPATIENT)
Dept: PHYSICAL THERAPY | Age: 53
Setting detail: THERAPIES SERIES
Discharge: HOME | End: 2023-07-05

## 2023-07-05 NOTE — FLOWSHEET NOTE
766 [facility] and [address], [address] [place] [address], [address] [place] [address] Drive  Phone: [phone]   Fax:     [phone]    Physical Therapy  Cancellation/No-show Note  Patient Name:  Rosio Rm  :  1970   Date:  2023    Cancelled visits to date: 2  No-shows to date: 0    For today's appointment patient:  [x]  Cancelled  [x]  Rescheduled appointment  []  No-show     Reason given by patient:  []  Patient ill  []  Conflicting appointment  []  No transportation    []  Conflict with work  []  No reason given  [x]  Other:     Comments:  moving. Phone call information:   []  Phone call made today to patient at am/pm at the number provided:      []  Patient answered, conversation as follows:    []  Patient did not answer, message left as follows:  [x]  Phone call not needed - pt contacted us to cancel and provided reason for cancellation.      Electronically signed by:  Nuris Simpson PT, PT

## 2023-07-10 ENCOUNTER — OFFICE VISIT (OUTPATIENT)
Dept: FAMILY MEDICINE CLINIC | Age: 53
End: 2023-07-10
Payer: COMMERCIAL

## 2023-07-10 VITALS
HEART RATE: 57 BPM | BODY MASS INDEX: 29.26 KG/M2 | DIASTOLIC BLOOD PRESSURE: 78 MMHG | WEIGHT: 159 LBS | OXYGEN SATURATION: 97 % | RESPIRATION RATE: 16 BRPM | SYSTOLIC BLOOD PRESSURE: 130 MMHG | HEIGHT: 62 IN | TEMPERATURE: 97.1 F

## 2023-07-10 DIAGNOSIS — I10 ESSENTIAL HYPERTENSION: ICD-10-CM

## 2023-07-10 DIAGNOSIS — I25.110 CORONARY ARTERY DISEASE INVOLVING NATIVE CORONARY ARTERY OF NATIVE HEART WITH UNSTABLE ANGINA PECTORIS (HCC): ICD-10-CM

## 2023-07-10 DIAGNOSIS — E11.9 TYPE 2 DIABETES MELLITUS WITHOUT COMPLICATION, WITHOUT LONG-TERM CURRENT USE OF INSULIN (HCC): ICD-10-CM

## 2023-07-10 DIAGNOSIS — E03.9 ACQUIRED HYPOTHYROIDISM: ICD-10-CM

## 2023-07-10 DIAGNOSIS — E03.9 ACQUIRED HYPOTHYROIDISM: Primary | ICD-10-CM

## 2023-07-10 LAB
CREATININE URINE POCT: NORMAL
MICROALBUMIN/CREAT 24H UR: NORMAL MG/G{CREAT}
MICROALBUMIN/CREAT UR-RTO: NORMAL

## 2023-07-10 PROCEDURE — 3074F SYST BP LT 130 MM HG: CPT | Performed by: STUDENT IN AN ORGANIZED HEALTH CARE EDUCATION/TRAINING PROGRAM

## 2023-07-10 PROCEDURE — 99214 OFFICE O/P EST MOD 30 MIN: CPT | Performed by: STUDENT IN AN ORGANIZED HEALTH CARE EDUCATION/TRAINING PROGRAM

## 2023-07-10 PROCEDURE — 82044 UR ALBUMIN SEMIQUANTITATIVE: CPT | Performed by: STUDENT IN AN ORGANIZED HEALTH CARE EDUCATION/TRAINING PROGRAM

## 2023-07-10 PROCEDURE — 3044F HG A1C LEVEL LT 7.0%: CPT | Performed by: STUDENT IN AN ORGANIZED HEALTH CARE EDUCATION/TRAINING PROGRAM

## 2023-07-10 PROCEDURE — 3078F DIAST BP <80 MM HG: CPT | Performed by: STUDENT IN AN ORGANIZED HEALTH CARE EDUCATION/TRAINING PROGRAM

## 2023-07-10 SDOH — ECONOMIC STABILITY: FOOD INSECURITY: WITHIN THE PAST 12 MONTHS, YOU WORRIED THAT YOUR FOOD WOULD RUN OUT BEFORE YOU GOT MONEY TO BUY MORE.: NEVER TRUE

## 2023-07-10 SDOH — ECONOMIC STABILITY: FOOD INSECURITY: WITHIN THE PAST 12 MONTHS, THE FOOD YOU BOUGHT JUST DIDN'T LAST AND YOU DIDN'T HAVE MONEY TO GET MORE.: NEVER TRUE

## 2023-07-10 SDOH — ECONOMIC STABILITY: INCOME INSECURITY: HOW HARD IS IT FOR YOU TO PAY FOR THE VERY BASICS LIKE FOOD, HOUSING, MEDICAL CARE, AND HEATING?: NOT HARD AT ALL

## 2023-07-10 ASSESSMENT — PATIENT HEALTH QUESTIONNAIRE - PHQ9
2. FEELING DOWN, DEPRESSED OR HOPELESS: 0
SUM OF ALL RESPONSES TO PHQ QUESTIONS 1-9: 0

## 2023-07-10 NOTE — PROGRESS NOTES
[place] [address] [address], [address]   Tel: [phone]      7/10/2023   SUBJECTIVE/OBJECTIVE  HPI    Lord Gray (:  1970) is a 52 y.o. female, here for evaluation of the following medical concerns:  Chief Complaint   Patient presents with    Hypothyroidism     Follow up     Patient is a 57-year-old female with history of TIA and MI, hypothyroidism who presents for follow up on hypothyroidism. Reports feeling better after the surgery still seeing PT. Hypothyroidism - Patient is currently taking Synthroid/levothyroxine 75 mcg M-F. Most recent TSH (23) was low at 0.02, with normal T4 and T3. Endorses feeling tired on Monday and  more cold. Patient reports no anxiety, cold intolerance, diarrhea/ constipation, depressed mood,, dry skin, hair loss, leg swelling, nail problem, palpitations, tremors, visual change or weight loss. Has lost 9lbs. Diabetes - New onset last A1c 6.5(23). Not taking any medication for diabetes, managed with diet and exercise. Eye Exam - Scheduled next month. Has cut back on carbohydrates, eating less pasta, bread and rice. Trying to increase exercise. HTN/HLD/ CAD -Sees Cardiology Dr. Maria Ines Gonzalez, last seen on 11/3/2022. Takes lisinopril 5 mg daily, metoprolol tartrate 25 mg twice daily, Brilinta 90 mg twice daily, Lipitor 80 mg daily, and aspirin 81 mg daily. Review of Systems  As above. Prior to Visit Medications    Medication Sig Taking?  Authorizing Provider   ticagrelor (BRILINTA) 90 MG TABS tablet Take 1 tablet by mouth 2 times daily Yes Carolina Zavala PA-C   aspirin EC 81 MG EC tablet Take 1 tablet by mouth 2 times daily Yes Carolina Zavala PA-C   levothyroxine (SYNTHROID) 75 MCG tablet Take 1 tablet by mouth daily Take 1 daily Monday - Friday, Skip on weekends Yes Arash Senior MD   metoprolol tartrate (LOPRESSOR) 25 MG tablet TAKE ONE TABLET BY MOUTH TWICE A DAY Yes Earl Stark MD

## 2023-07-11 ENCOUNTER — HOSPITAL ENCOUNTER (OUTPATIENT)
Dept: PHYSICAL THERAPY | Age: 53
Setting detail: THERAPIES SERIES
Discharge: HOME | End: 2023-07-11
Payer: COMMERCIAL

## 2023-07-11 LAB
CHOLEST SERPL-MCNC: 133 MG/DL (ref 0–199)
HDLC SERPL-MCNC: 58 MG/DL (ref 40–60)
LDL CHOLESTEROL CALCULATED: 55 MG/DL
TRIGL SERPL-MCNC: 98 MG/DL (ref 0–150)
TSH SERPL DL<=0.005 MIU/L-ACNC: 1.29 UIU/ML (ref 0.27–4.2)
VLDLC SERPL CALC-MCNC: 20 MG/DL

## 2023-07-11 PROCEDURE — 97110 THERAPEUTIC EXERCISES: CPT

## 2023-07-11 PROCEDURE — 97112 NEUROMUSCULAR REEDUCATION: CPT

## 2023-07-11 NOTE — PROGRESS NOTES
FW/BW, side to side  Step and hold  SLS     10s 1min ea  X10  10 Vc    Added foam    Bike ROM 6 min                           Pt education 10 min  Reviewed precautions,  HEP with gradual progression, goals of PT, not to push through pain with ex or activity- pt stated understanding    Manual Intervention (44037)       Gentle PROM  5 min                                         NMR re-education (51417)   CUES NEEDED                                                                   Therapeutic Activity (03093)                                          E-nterview access code:  [email or web address]           Therapeutic Exercise and NMR EXR  [x] (65815) Provided verbal/tactile cueing for activities related to strengthening, flexibility, endurance, ROM for improvements in LE, proximal hip, and core control with self care, mobility, lifting, ambulation. [x] (38938) Provided verbal/tactile cueing for activities related to improving balance, coordination, kinesthetic sense, posture, motor skill, proprioception to assist with LE, proximal hip, and core control in self-care, mobility, lifting, ambulation and eccentric single leg control.      NMR and Therapeutic Activities:    [x] (02672 or 81192) Provided verbal/tactile cueing for activities related to improving balance, coordination, kinesthetic sense, posture, motor skill, proprioception and motor activation to allow for proper function of core, proximal hip and LE with self-care and ADLs and functional mobility.   [] (73931) Gait Re-education- Provided training and instruction to the patient for proper LE, core and proximal hip recruitment and positioning and eccentric body weight control with ambulation re-education including up and down stairs     Home Exercise Program:    [x] (82452) Reviewed/Progressed HEP activities related to strengthening, flexibility, endurance, ROM of core, proximal hip and LE for functional self-care, mobility, lifting and ambulation/stair navigation   []

## 2023-07-20 ENCOUNTER — HOSPITAL ENCOUNTER (OUTPATIENT)
Dept: PHYSICAL THERAPY | Age: 53
Setting detail: THERAPIES SERIES
End: 2023-07-20
Payer: COMMERCIAL

## 2023-10-09 RX ORDER — LISINOPRIL 5 MG/1
5 TABLET ORAL DAILY
Qty: 90 TABLET | Refills: 0 | Status: SHIPPED | OUTPATIENT
Start: 2023-10-09

## 2023-11-08 NOTE — PROGRESS NOTES
[facility], [place], operated by [facility]   CARDIAC EVALUATION NOTE  [phone]      PCP:  Furman Halsted, MD    Reason for Consultation/Chief Complaint: 1 year follow up for CAD    Subjective   History of Present Illness:  David Cadena is a 52 y.o. patient who presents for follow up. She presented to ED 4/13/2021 with chest pain, substernal burning, exertional, radiated to left arm. Troponin's elevated, + NSTEMI. LHC demonstrated 99% of Ramus artery but a small vessel, other disease noted but not flow limiting. Medical management recommended. OV 7/21/21, she reported that she still had occasional chest burning that is intermittent. She was unsure if anything causes it. It was not as severe as prior to [facility]. No associated arm pain. She had no new medications. She was taking medications as prescribed and tolerates them well. She gets short of breath when she bends over. No cp/sob w/ exertion. LOV, 11/3/2022, Echo and stress test ordered. she reported that when she was walking up a hill on Halloween her left arm felt heavy, when she got to the top she states it went back to feeling normal.     Echo 12/2022 EF 55%, mild TR,  trace MR and FL, SPAP 24. Stress test 12/13/2022 EF 65%, no ischemia. Today, 11/9/2023, ***        Past Medical History:   has a past medical history of CAD (coronary artery disease), Cerebral artery occlusion with cerebral infarction ([address]), Decorative tattoo, Diabetes mellitus ([address]), Hyperlipidemia, Hypertension, PONV (postoperative nausea and vomiting), Primary osteoarthritis of left hip, and Thyroid disease. Surgical History:   has a past surgical history that includes cyst removal and Total hip arthroplasty (Left, 5/22/2023). Social History:   reports that she quit smoking about 23 years ago. Her smoking use included cigarettes. She started smoking about 33 years ago. She has a 10.00 pack-year smoking history. She has been exposed to tobacco smoke.  She has never used

## 2023-11-08 NOTE — PROGRESS NOTES
[address] [facility]   CARDIAC EVALUATION NOTE  [phone]      PCP:  Madyson Cruz MD    Reason for Consultation/Chief Complaint: 1 year follow up for CAD    Subjective   History of Present Illness:  Genice Pallas is a 52 y.o. patient who presents for follow up. She presented to ED 4/13/2021 with chest pain, substernal burning, exertional, radiated to left arm. Troponin's elevated, + NSTEMI. LHC demonstrated 99% of Ramus artery but a small vessel, other disease noted but not flow limiting. Medical management recommended. OV 7/21/21, she reported that she still had occasional chest burning that is intermittent. She was unsure if anything causes it. It was not as severe as prior to [facility]. No associated arm pain. She had no new medications. She was taking medications as prescribed and tolerates them well. She gets short of breath when she bends over. No cp/sob w/ exertion. LOV, 11/3/2022, Echo and stress test ordered. she reported that when she was walking up a hill on Halloween her left arm felt heavy, when she got to the top she states it went back to feeling normal.               Echo 12/2022 EF 55%, mild TR,  trace MR and WI, SPAP 24. Stress test 12/13/2022 EF 65%, no ischemia. Today, 11/9/2023, she states she feels well. She reported that she needs to exercise more since her hip replacement. She reports taking medications as prescribed and tolerates well. Denies Shortness of breath, chest pain, palpitations, dizziness, syncope and edema. Past Medical History:   has a past medical history of CAD (coronary artery disease), Cerebral artery occlusion with cerebral infarction ([address]), Decorative tattoo, Diabetes mellitus ([address]), Hyperlipidemia, Hypertension, PONV (postoperative nausea and vomiting), Primary osteoarthritis of left hip, and Thyroid disease.     Surgical History:   has a past surgical history that includes cyst removal and Total

## 2023-11-09 ENCOUNTER — OFFICE VISIT (OUTPATIENT)
Dept: CARDIOLOGY CLINIC | Age: 53
End: 2023-11-09
Payer: COMMERCIAL

## 2023-11-09 VITALS
BODY MASS INDEX: 30.55 KG/M2 | WEIGHT: 166 LBS | OXYGEN SATURATION: 94 % | DIASTOLIC BLOOD PRESSURE: 68 MMHG | HEART RATE: 60 BPM | HEIGHT: 62 IN | SYSTOLIC BLOOD PRESSURE: 110 MMHG

## 2023-11-09 DIAGNOSIS — E78.2 MIXED HYPERLIPIDEMIA: ICD-10-CM

## 2023-11-09 DIAGNOSIS — Z82.49 FAMILY HISTORY OF CORONARY ARTERY DISEASE: ICD-10-CM

## 2023-11-09 DIAGNOSIS — I25.110 CORONARY ARTERY DISEASE INVOLVING NATIVE CORONARY ARTERY OF NATIVE HEART WITH UNSTABLE ANGINA PECTORIS (HCC): ICD-10-CM

## 2023-11-09 DIAGNOSIS — I10 ESSENTIAL HYPERTENSION: Primary | ICD-10-CM

## 2023-11-09 PROCEDURE — 3074F SYST BP LT 130 MM HG: CPT | Performed by: INTERNAL MEDICINE

## 2023-11-09 PROCEDURE — 3078F DIAST BP <80 MM HG: CPT | Performed by: INTERNAL MEDICINE

## 2023-11-09 PROCEDURE — 99214 OFFICE O/P EST MOD 30 MIN: CPT | Performed by: INTERNAL MEDICINE

## 2023-11-09 NOTE — PATIENT INSTRUCTIONS
Continue current cardiac medications: Aspirin 81 mg, Lipitor 80 mg, Lisinopril 5 mg, Metoprolol 25 mg, Brilinta 90 mg  2. Increase your exercise to increase your heart rate for 30-45 min three to four times per week. Aerobic activity  3. No refills warranted today  4. No cardiac testing warranted today  5.    Follow up in 1 year

## 2023-12-14 DIAGNOSIS — E03.9 ACQUIRED HYPOTHYROIDISM: ICD-10-CM

## 2023-12-14 RX ORDER — LEVOTHYROXINE SODIUM 0.07 MG/1
75 TABLET ORAL
Qty: 30 TABLET | Refills: 3 | Status: SHIPPED | OUTPATIENT
Start: 2023-12-14

## 2024-01-03 ENCOUNTER — COMMUNITY OUTREACH (OUTPATIENT)
Dept: FAMILY MEDICINE CLINIC | Age: 54
End: 2024-01-03

## 2024-01-03 NOTE — PROGRESS NOTES
Patient's HM shows they are overdue for Mammogram.  Care Everywhere and  files searched.  No results to attach to order nor HM updated.

## 2024-01-16 RX ORDER — LISINOPRIL 5 MG/1
5 TABLET ORAL DAILY
Qty: 90 TABLET | Refills: 2 | Status: SHIPPED | OUTPATIENT
Start: 2024-01-16

## 2024-04-22 RX ORDER — TICAGRELOR 90 MG/1
90 TABLET ORAL 2 TIMES DAILY
Qty: 180 TABLET | Refills: 1 | Status: SHIPPED | OUTPATIENT
Start: 2024-04-22

## 2024-06-04 DIAGNOSIS — E03.9 ACQUIRED HYPOTHYROIDISM: ICD-10-CM

## 2024-06-04 RX ORDER — LEVOTHYROXINE SODIUM 0.07 MG/1
75 TABLET ORAL
Qty: 30 TABLET | Refills: 0 | Status: SHIPPED | OUTPATIENT
Start: 2024-06-04

## 2024-06-05 ENCOUNTER — OFFICE VISIT (OUTPATIENT)
Dept: FAMILY MEDICINE CLINIC | Age: 54
End: 2024-06-05
Payer: COMMERCIAL

## 2024-06-05 VITALS
WEIGHT: 170 LBS | BODY MASS INDEX: 31.09 KG/M2 | DIASTOLIC BLOOD PRESSURE: 70 MMHG | HEART RATE: 48 BPM | OXYGEN SATURATION: 99 % | SYSTOLIC BLOOD PRESSURE: 130 MMHG | TEMPERATURE: 97.2 F | RESPIRATION RATE: 16 BRPM

## 2024-06-05 DIAGNOSIS — R53.83 OTHER FATIGUE: ICD-10-CM

## 2024-06-05 DIAGNOSIS — E03.9 ACQUIRED HYPOTHYROIDISM: ICD-10-CM

## 2024-06-05 DIAGNOSIS — I25.110 CORONARY ARTERY DISEASE INVOLVING NATIVE CORONARY ARTERY OF NATIVE HEART WITH UNSTABLE ANGINA PECTORIS (HCC): ICD-10-CM

## 2024-06-05 DIAGNOSIS — I10 ESSENTIAL HYPERTENSION: ICD-10-CM

## 2024-06-05 DIAGNOSIS — E11.9 TYPE 2 DIABETES MELLITUS WITHOUT COMPLICATION, WITHOUT LONG-TERM CURRENT USE OF INSULIN (HCC): ICD-10-CM

## 2024-06-05 DIAGNOSIS — E03.9 ACQUIRED HYPOTHYROIDISM: Primary | ICD-10-CM

## 2024-06-05 LAB
25(OH)D3 SERPL-MCNC: 28.7 NG/ML
ALBUMIN SERPL-MCNC: 4.9 G/DL (ref 3.4–5)
ALBUMIN/GLOB SERPL: 2 {RATIO} (ref 1.1–2.2)
ALP SERPL-CCNC: 122 U/L (ref 40–129)
ALT SERPL-CCNC: 40 U/L (ref 10–40)
ANION GAP SERPL CALCULATED.3IONS-SCNC: 13 MMOL/L (ref 3–16)
AST SERPL-CCNC: 28 U/L (ref 15–37)
BASOPHILS # BLD: 0 K/UL (ref 0–0.2)
BASOPHILS NFR BLD: 0.7 %
BILIRUB SERPL-MCNC: 0.8 MG/DL (ref 0–1)
BUN SERPL-MCNC: 10 MG/DL (ref 7–20)
CALCIUM SERPL-MCNC: 10 MG/DL (ref 8.3–10.6)
CHLORIDE SERPL-SCNC: 107 MMOL/L (ref 99–110)
CO2 SERPL-SCNC: 23 MMOL/L (ref 21–32)
CREAT SERPL-MCNC: 0.9 MG/DL (ref 0.6–1.1)
DEPRECATED RDW RBC AUTO: 14.7 % (ref 12.4–15.4)
EOSINOPHIL # BLD: 0.2 K/UL (ref 0–0.6)
EOSINOPHIL NFR BLD: 3.1 %
GFR SERPLBLD CREATININE-BSD FMLA CKD-EPI: 75 ML/MIN/{1.73_M2}
GLUCOSE SERPL-MCNC: 126 MG/DL (ref 70–99)
HCT VFR BLD AUTO: 36 % (ref 36–48)
HGB BLD-MCNC: 12.3 G/DL (ref 12–16)
LYMPHOCYTES # BLD: 1.4 K/UL (ref 1–5.1)
LYMPHOCYTES NFR BLD: 28.2 %
MCH RBC QN AUTO: 33.2 PG (ref 26–34)
MCHC RBC AUTO-ENTMCNC: 34.1 G/DL (ref 31–36)
MCV RBC AUTO: 97.3 FL (ref 80–100)
MONOCYTES # BLD: 0.3 K/UL (ref 0–1.3)
MONOCYTES NFR BLD: 6.8 %
NEUTROPHILS # BLD: 3 K/UL (ref 1.7–7.7)
NEUTROPHILS NFR BLD: 61.2 %
PLATELET # BLD AUTO: 215 K/UL (ref 135–450)
PMV BLD AUTO: 9.5 FL (ref 5–10.5)
POTASSIUM SERPL-SCNC: 4.5 MMOL/L (ref 3.5–5.1)
PROT SERPL-MCNC: 7.3 G/DL (ref 6.4–8.2)
RBC # BLD AUTO: 3.7 M/UL (ref 4–5.2)
SODIUM SERPL-SCNC: 143 MMOL/L (ref 136–145)
T3FREE SERPL-MCNC: 2.6 PG/ML (ref 2.3–4.2)
TSH SERPL DL<=0.005 MIU/L-ACNC: 2.82 UIU/ML (ref 0.27–4.2)
VIT B12 SERPL-MCNC: 557 PG/ML (ref 211–911)
WBC # BLD AUTO: 5 K/UL (ref 4–11)

## 2024-06-05 PROCEDURE — 3044F HG A1C LEVEL LT 7.0%: CPT | Performed by: STUDENT IN AN ORGANIZED HEALTH CARE EDUCATION/TRAINING PROGRAM

## 2024-06-05 PROCEDURE — 3075F SYST BP GE 130 - 139MM HG: CPT | Performed by: STUDENT IN AN ORGANIZED HEALTH CARE EDUCATION/TRAINING PROGRAM

## 2024-06-05 PROCEDURE — 99214 OFFICE O/P EST MOD 30 MIN: CPT | Performed by: STUDENT IN AN ORGANIZED HEALTH CARE EDUCATION/TRAINING PROGRAM

## 2024-06-05 PROCEDURE — 3078F DIAST BP <80 MM HG: CPT | Performed by: STUDENT IN AN ORGANIZED HEALTH CARE EDUCATION/TRAINING PROGRAM

## 2024-06-05 RX ORDER — ATORVASTATIN CALCIUM 80 MG/1
80 TABLET, FILM COATED ORAL NIGHTLY
Qty: 90 TABLET | Refills: 3 | Status: SHIPPED | OUTPATIENT
Start: 2024-06-05

## 2024-06-05 SDOH — ECONOMIC STABILITY: FOOD INSECURITY: WITHIN THE PAST 12 MONTHS, YOU WORRIED THAT YOUR FOOD WOULD RUN OUT BEFORE YOU GOT MONEY TO BUY MORE.: NEVER TRUE

## 2024-06-05 SDOH — ECONOMIC STABILITY: FOOD INSECURITY: WITHIN THE PAST 12 MONTHS, THE FOOD YOU BOUGHT JUST DIDN'T LAST AND YOU DIDN'T HAVE MONEY TO GET MORE.: NEVER TRUE

## 2024-06-05 SDOH — ECONOMIC STABILITY: INCOME INSECURITY: HOW HARD IS IT FOR YOU TO PAY FOR THE VERY BASICS LIKE FOOD, HOUSING, MEDICAL CARE, AND HEATING?: NOT HARD AT ALL

## 2024-06-05 ASSESSMENT — PATIENT HEALTH QUESTIONNAIRE - PHQ9
SUM OF ALL RESPONSES TO PHQ9 QUESTIONS 1 & 2: 0
1. LITTLE INTEREST OR PLEASURE IN DOING THINGS: NOT AT ALL
1. LITTLE INTEREST OR PLEASURE IN DOING THINGS: NOT AT ALL
SUM OF ALL RESPONSES TO PHQ QUESTIONS 1-9: 0
SUM OF ALL RESPONSES TO PHQ9 QUESTIONS 1 & 2: 0
SUM OF ALL RESPONSES TO PHQ QUESTIONS 1-9: 0
SUM OF ALL RESPONSES TO PHQ QUESTIONS 1-9: 0
2. FEELING DOWN, DEPRESSED OR HOPELESS: NOT AT ALL
2. FEELING DOWN, DEPRESSED OR HOPELESS: NOT AT ALL
SUM OF ALL RESPONSES TO PHQ QUESTIONS 1-9: 0

## 2024-06-05 NOTE — PROGRESS NOTES
??continue current ? medications  Continue dietary efforts and physical activity.   -     CBC with Auto Differential; Future  -     Comprehensive Metabolic Panel; Future  Coronary artery disease involving native coronary artery of native heart with unstable angina pectoris (HCC)  Sees Cardiology Dr. Ji, last seen on 11/2023. Takes lisinopril 5 mg daily, metoprolol tartrate 25 mg twice daily, Brilinta 90 mg twice daily, Lipitor 80 mg daily, and aspirin 81 mg daily.  -     atorvastatin (LIPITOR) 80 MG tablet; Take 1 tablet by mouth nightly, Disp-90 tablet, R-3Normal  Other fatigue  -     TSH with Reflex; Future  -     Vitamin B12; Future  -     Vitamin D 25 Hydroxy; Future  -     T3, Free; Future        Patient Instructions     Most recent CBC, CMP, TSH, vitamin B12 and vitamin D   -sleep , balanced diet, and routine physical activity.        An electronic signature was used to authenticate this note.  --Sharon Jaime MD on 6/5/2024

## 2024-06-05 NOTE — PATIENT INSTRUCTIONS
Most recent CBC, CMP, TSH, vitamin B12 and vitamin D   -sleep , balanced diet, and routine physical activity.

## 2024-06-06 LAB
EST. AVERAGE GLUCOSE BLD GHB EST-MCNC: 137 MG/DL
HBA1C MFR BLD: 6.4 %

## 2024-06-14 DIAGNOSIS — E03.9 ACQUIRED HYPOTHYROIDISM: ICD-10-CM

## 2024-06-14 RX ORDER — LEVOTHYROXINE SODIUM 0.07 MG/1
75 TABLET ORAL
Qty: 19 TABLET | OUTPATIENT
Start: 2024-06-14

## 2024-07-14 DIAGNOSIS — E03.9 ACQUIRED HYPOTHYROIDISM: ICD-10-CM

## 2024-07-15 DIAGNOSIS — E03.9 ACQUIRED HYPOTHYROIDISM: ICD-10-CM

## 2024-07-15 RX ORDER — LEVOTHYROXINE SODIUM 75 UG/1
TABLET ORAL
Qty: 30 TABLET | Refills: 0 | OUTPATIENT
Start: 2024-07-15

## 2024-07-15 RX ORDER — LEVOTHYROXINE SODIUM 0.07 MG/1
TABLET ORAL
Qty: 30 TABLET | Refills: 1 | Status: SHIPPED | OUTPATIENT
Start: 2024-07-15

## 2024-07-15 NOTE — TELEPHONE ENCOUNTER
Future Appointments   Date Time Provider Department Center   9/9/2024  1:00 PM Sharon Jaime MD [facility] [place] [facility]             LOV 6/5/2024

## 2024-07-17 ENCOUNTER — TELEPHONE (OUTPATIENT)
Dept: ADMINISTRATIVE | Age: 54
End: 2024-07-17

## 2024-07-17 NOTE — TELEPHONE ENCOUNTER
Submitted PA for Levothyroxine Sodium 75MCG tablets   Via CMM Key: [id number] STATUS: Available without authorization.     Please notify patient. Thank you.

## 2024-09-09 ENCOUNTER — HOSPITAL ENCOUNTER (OUTPATIENT)
Dept: MAMMOGRAPHY | Age: 54
Discharge: HOME | End: 2024-09-09
Payer: COMMERCIAL

## 2024-09-09 ENCOUNTER — OFFICE VISIT (OUTPATIENT)
Dept: FAMILY MEDICINE CLINIC | Age: 54
End: 2024-09-09
Payer: COMMERCIAL

## 2024-09-09 VITALS
BODY MASS INDEX: 30.72 KG/M2 | RESPIRATION RATE: 16 BRPM | DIASTOLIC BLOOD PRESSURE: 70 MMHG | OXYGEN SATURATION: 99 % | WEIGHT: 168 LBS | HEART RATE: 50 BPM | TEMPERATURE: 97.8 F | SYSTOLIC BLOOD PRESSURE: 124 MMHG

## 2024-09-09 VITALS — WEIGHT: 167 LBS | HEIGHT: 62 IN | BODY MASS INDEX: 30.73 KG/M2

## 2024-09-09 DIAGNOSIS — Z13.220 SCREENING FOR LIPID DISORDERS: ICD-10-CM

## 2024-09-09 DIAGNOSIS — I10 ESSENTIAL HYPERTENSION: ICD-10-CM

## 2024-09-09 DIAGNOSIS — Z86.73 HISTORY OF STROKE: ICD-10-CM

## 2024-09-09 DIAGNOSIS — Z00.00 ANNUAL PHYSICAL EXAM: Primary | ICD-10-CM

## 2024-09-09 DIAGNOSIS — G45.1 TIA INVOLVING RIGHT INTERNAL CAROTID ARTERY: ICD-10-CM

## 2024-09-09 DIAGNOSIS — Z12.11 SCREENING FOR MALIGNANT NEOPLASM OF COLON: ICD-10-CM

## 2024-09-09 DIAGNOSIS — Z12.31 ENCOUNTER FOR SCREENING MAMMOGRAM FOR MALIGNANT NEOPLASM OF BREAST: ICD-10-CM

## 2024-09-09 DIAGNOSIS — E11.9 TYPE 2 DIABETES MELLITUS WITHOUT COMPLICATION, WITHOUT LONG-TERM CURRENT USE OF INSULIN (HCC): ICD-10-CM

## 2024-09-09 DIAGNOSIS — E78.2 MIXED HYPERLIPIDEMIA: ICD-10-CM

## 2024-09-09 DIAGNOSIS — E03.9 ACQUIRED HYPOTHYROIDISM: Chronic | ICD-10-CM

## 2024-09-09 DIAGNOSIS — I25.110 CORONARY ARTERY DISEASE INVOLVING NATIVE CORONARY ARTERY OF NATIVE HEART WITH UNSTABLE ANGINA PECTORIS (HCC): ICD-10-CM

## 2024-09-09 PROCEDURE — 77063 BREAST TOMOSYNTHESIS BI: CPT

## 2024-09-09 PROCEDURE — 3078F DIAST BP <80 MM HG: CPT | Performed by: STUDENT IN AN ORGANIZED HEALTH CARE EDUCATION/TRAINING PROGRAM

## 2024-09-09 PROCEDURE — 99396 PREV VISIT EST AGE 40-64: CPT | Performed by: STUDENT IN AN ORGANIZED HEALTH CARE EDUCATION/TRAINING PROGRAM

## 2024-09-09 PROCEDURE — 3074F SYST BP LT 130 MM HG: CPT | Performed by: STUDENT IN AN ORGANIZED HEALTH CARE EDUCATION/TRAINING PROGRAM

## 2024-09-09 RX ORDER — LEVOTHYROXINE SODIUM 75 UG/1
75 TABLET ORAL DAILY
Qty: 90 TABLET | Refills: 2 | Status: SHIPPED | OUTPATIENT
Start: 2024-09-09

## 2024-09-09 SDOH — ECONOMIC STABILITY: FOOD INSECURITY: WITHIN THE PAST 12 MONTHS, THE FOOD YOU BOUGHT JUST DIDN'T LAST AND YOU DIDN'T HAVE MONEY TO GET MORE.: NEVER TRUE

## 2024-09-09 SDOH — ECONOMIC STABILITY: FOOD INSECURITY: WITHIN THE PAST 12 MONTHS, YOU WORRIED THAT YOUR FOOD WOULD RUN OUT BEFORE YOU GOT MONEY TO BUY MORE.: NEVER TRUE

## 2024-09-09 SDOH — ECONOMIC STABILITY: INCOME INSECURITY: HOW HARD IS IT FOR YOU TO PAY FOR THE VERY BASICS LIKE FOOD, HOUSING, MEDICAL CARE, AND HEATING?: NOT VERY HARD

## 2024-09-09 ASSESSMENT — ENCOUNTER SYMPTOMS
CONSTIPATION: 0
TROUBLE SWALLOWING: 0
VOMITING: 0
ABDOMINAL PAIN: 0
WHEEZING: 0
COUGH: 0
SHORTNESS OF BREATH: 0
CHEST TIGHTNESS: 0
NAUSEA: 0
DIARRHEA: 1
BLOOD IN STOOL: 0

## 2024-09-09 ASSESSMENT — PATIENT HEALTH QUESTIONNAIRE - PHQ9
SUM OF ALL RESPONSES TO PHQ QUESTIONS 1-9: 0
SUM OF ALL RESPONSES TO PHQ9 QUESTIONS 1 & 2: 0
2. FEELING DOWN, DEPRESSED OR HOPELESS: NOT AT ALL
1. LITTLE INTEREST OR PLEASURE IN DOING THINGS: NOT AT ALL
SUM OF ALL RESPONSES TO PHQ QUESTIONS 1-9: 0

## 2024-09-10 LAB
CHOLEST SERPL-MCNC: 160 MG/DL (ref 0–199)
HDLC SERPL-MCNC: 63 MG/DL (ref 40–60)
LDLC SERPL CALC-MCNC: 67 MG/DL
TRIGL SERPL-MCNC: 150 MG/DL (ref 0–150)
VLDLC SERPL CALC-MCNC: 30 MG/DL

## 2024-10-04 DIAGNOSIS — E03.9 ACQUIRED HYPOTHYROIDISM: Chronic | ICD-10-CM

## 2024-10-04 RX ORDER — LEVOTHYROXINE SODIUM 75 UG/1
TABLET ORAL
Qty: 90 TABLET | Refills: 0 | Status: SHIPPED | OUTPATIENT
Start: 2024-10-04

## 2024-10-04 NOTE — TELEPHONE ENCOUNTER
Future Appointments   Date Time Provider Department Center   11/19/2024  1:45 PM Scar Ji MD [facility]   3/10/2025  1:00 PM Sharon Jaime MD [facility]             LOV 9/9/2024

## 2024-10-22 RX ORDER — LISINOPRIL 5 MG/1
5 TABLET ORAL DAILY
Qty: 30 TABLET | Refills: 5 | Status: SHIPPED | OUTPATIENT
Start: 2024-10-22

## 2024-10-22 RX ORDER — TICAGRELOR 90 MG/1
90 TABLET ORAL 2 TIMES DAILY
Qty: 60 TABLET | Refills: 5 | Status: SHIPPED | OUTPATIENT
Start: 2024-10-22

## 2024-10-22 RX ORDER — METOPROLOL TARTRATE 25 MG/1
25 TABLET, FILM COATED ORAL 2 TIMES DAILY
Qty: 60 TABLET | Refills: 5 | Status: SHIPPED | OUTPATIENT
Start: 2024-10-22

## 2024-10-22 NOTE — TELEPHONE ENCOUNTER
Last ov: 11/9/23 [facility]  Upcoming ov: 11/19/24 [facility]    Labs- CMP/CBC/LIPID/LFT 6/5/24

## 2024-11-19 ENCOUNTER — OFFICE VISIT (OUTPATIENT)
Dept: CARDIOLOGY CLINIC | Age: 54
End: 2024-11-19
Payer: COMMERCIAL

## 2024-11-19 VITALS
SYSTOLIC BLOOD PRESSURE: 136 MMHG | BODY MASS INDEX: 31.38 KG/M2 | HEART RATE: 39 BPM | DIASTOLIC BLOOD PRESSURE: 70 MMHG | HEIGHT: 62 IN | OXYGEN SATURATION: 99 % | WEIGHT: 170.5 LBS

## 2024-11-19 DIAGNOSIS — I25.110 CORONARY ARTERY DISEASE INVOLVING NATIVE CORONARY ARTERY OF NATIVE HEART WITH UNSTABLE ANGINA PECTORIS (HCC): ICD-10-CM

## 2024-11-19 DIAGNOSIS — E78.2 MIXED HYPERLIPIDEMIA: ICD-10-CM

## 2024-11-19 DIAGNOSIS — I10 ESSENTIAL HYPERTENSION: Primary | ICD-10-CM

## 2024-11-19 DIAGNOSIS — G45.1 TIA INVOLVING RIGHT INTERNAL CAROTID ARTERY: ICD-10-CM

## 2024-11-19 PROCEDURE — 93000 ELECTROCARDIOGRAM COMPLETE: CPT | Performed by: INTERNAL MEDICINE

## 2024-11-19 PROCEDURE — 3078F DIAST BP <80 MM HG: CPT | Performed by: INTERNAL MEDICINE

## 2024-11-19 PROCEDURE — 3075F SYST BP GE 130 - 139MM HG: CPT | Performed by: INTERNAL MEDICINE

## 2024-11-19 PROCEDURE — 99214 OFFICE O/P EST MOD 30 MIN: CPT | Performed by: INTERNAL MEDICINE

## 2024-11-19 NOTE — PATIENT INSTRUCTIONS
Plan   Recommend monitoring BP and HR with activity log and any symptoms at home for 1 week and report findings back to us. Depending on results, we may consider decreasing metoprolol.   Follow up with NP in 1 year.

## 2024-11-19 NOTE — PROGRESS NOTES
scar.   Normal left ventricular systolic function and wall motion with calculated   LVEF of >65%.   Overall findings represent a low risk scan    Cath: 4/14/2021  FINDINGS        LVGRAM     LVEDP  11   GRADIENT ACROSS AORTIC VALVE  none   LV FUNCTION EF 60%   WALL MOTION  normal   MITRAL REGURGITATION  mild         CORONARY ARTERIES     LM  Proximal-mid less than 10% stenosis, distal 30 to 40% stenosis.         LAD  Small to medium sized vessel, proximal-mid 20 to 30% stenosis, distal 40% stenosis.     D1 has diffuse disease with [id number]-[id number]-letjqe 70% stenosis         LCX  10 to 20% proximal stenosis, mid 30 to 40% stenosis that extends into OM 3 which is the largest OM branch.         RI Small vessel, ostial/proximal 70% stenosis followed by mid 99% stenosis.         RCA Dominant, proximal 10% stenosis, mid 20% stenosis, distal 30% stenosis.     PLV has less than 10% proximal/mid-distal stenosis  PDA has less than 10% proximal stenosis, has mid 75% stenosis, distal less than 10% stenosis               DFR/IVUS DESCRIPTION      6 Georgian JL 3.5 guiding catheter was used for this portion of the procedure.  Heparin was utilized for anticoagulation.  A choice floppy wire was used to cross the lesions in the left main and circumflex and IVUS was performed which revealed moderate ostial circumflex stenosis of 50 to 60%.  Distal left main had 20% stenosis by IVUS.  Attention then turned towards DFR and using the [employer] comet wire, wire was appropriately calibrated/normalized and DFR measurements were 0.92 across the left main into circumflex.  This was most consistent with moderate CAD.           CONCLUSIONS:      Moderate epicardial CAD/ASHD (in proximal-mid segments)  Severe lesion in small ramus artery  Treat medically with aspirin, add Brilinta.  Continue lisinopril and statin, add beta-blocker      Studies:       I have reviewed labs and imaging/xray/diagnostic testing in this note.    Assessment      1.

## 2025-01-28 ENCOUNTER — TELEPHONE (OUTPATIENT)
Dept: FAMILY MEDICINE CLINIC | Age: 55
End: 2025-01-28

## 2025-01-28 NOTE — TELEPHONE ENCOUNTER
Please advise patient that she can try antihistamine/allergy medication such as Claritin or Zyrtec if she is having any sinus and nasal symptoms.  She can also take Robitussin or Mucinex next DM cough syrup if she is having a cough.  Please schedule appointment if symptoms do not improve with over-the-counter treatment.

## 2025-01-28 NOTE — TELEPHONE ENCOUNTER
Pt. Called in states she has a cold and has not taken any medication because she does not know what will react with her medicine would like a call back to let her know what she can take with her medicine.    Please advise

## 2025-03-25 ENCOUNTER — OFFICE VISIT (OUTPATIENT)
Dept: FAMILY MEDICINE CLINIC | Age: 55
End: 2025-03-25
Payer: COMMERCIAL

## 2025-03-25 VITALS
WEIGHT: 169 LBS | BODY MASS INDEX: 30.9 KG/M2 | DIASTOLIC BLOOD PRESSURE: 80 MMHG | OXYGEN SATURATION: 99 % | HEART RATE: 54 BPM | SYSTOLIC BLOOD PRESSURE: 130 MMHG | TEMPERATURE: 97.7 F | RESPIRATION RATE: 16 BRPM

## 2025-03-25 DIAGNOSIS — E78.2 MIXED HYPERLIPIDEMIA: ICD-10-CM

## 2025-03-25 DIAGNOSIS — E11.9 TYPE 2 DIABETES MELLITUS WITHOUT COMPLICATION, WITHOUT LONG-TERM CURRENT USE OF INSULIN: ICD-10-CM

## 2025-03-25 DIAGNOSIS — G45.1 TIA INVOLVING RIGHT INTERNAL CAROTID ARTERY: ICD-10-CM

## 2025-03-25 DIAGNOSIS — E03.9 ACQUIRED HYPOTHYROIDISM: Chronic | ICD-10-CM

## 2025-03-25 DIAGNOSIS — I10 ESSENTIAL HYPERTENSION: Primary | ICD-10-CM

## 2025-03-25 DIAGNOSIS — Z23 NEED FOR TDAP VACCINATION: ICD-10-CM

## 2025-03-25 PROCEDURE — 90715 TDAP VACCINE 7 YRS/> IM: CPT | Performed by: STUDENT IN AN ORGANIZED HEALTH CARE EDUCATION/TRAINING PROGRAM

## 2025-03-25 PROCEDURE — 3075F SYST BP GE 130 - 139MM HG: CPT | Performed by: STUDENT IN AN ORGANIZED HEALTH CARE EDUCATION/TRAINING PROGRAM

## 2025-03-25 PROCEDURE — 90471 IMMUNIZATION ADMIN: CPT | Performed by: STUDENT IN AN ORGANIZED HEALTH CARE EDUCATION/TRAINING PROGRAM

## 2025-03-25 PROCEDURE — 3079F DIAST BP 80-89 MM HG: CPT | Performed by: STUDENT IN AN ORGANIZED HEALTH CARE EDUCATION/TRAINING PROGRAM

## 2025-03-25 PROCEDURE — 99214 OFFICE O/P EST MOD 30 MIN: CPT | Performed by: STUDENT IN AN ORGANIZED HEALTH CARE EDUCATION/TRAINING PROGRAM

## 2025-03-25 RX ORDER — LISINOPRIL 5 MG/1
5 TABLET ORAL DAILY
Qty: 90 TABLET | Refills: 0 | Status: SHIPPED | OUTPATIENT
Start: 2025-03-25

## 2025-03-25 RX ORDER — LEVOTHYROXINE SODIUM 75 UG/1
75 TABLET ORAL DAILY
Qty: 90 TABLET | Refills: 0 | Status: SHIPPED | OUTPATIENT
Start: 2025-03-25

## 2025-03-25 RX ORDER — METOPROLOL TARTRATE 25 MG/1
25 TABLET, FILM COATED ORAL 2 TIMES DAILY
Qty: 60 TABLET | Refills: 5 | Status: SHIPPED | OUTPATIENT
Start: 2025-03-25

## 2025-03-25 SDOH — ECONOMIC STABILITY: FOOD INSECURITY: WITHIN THE PAST 12 MONTHS, YOU WORRIED THAT YOUR FOOD WOULD RUN OUT BEFORE YOU GOT MONEY TO BUY MORE.: NEVER TRUE

## 2025-03-25 SDOH — ECONOMIC STABILITY: FOOD INSECURITY: WITHIN THE PAST 12 MONTHS, THE FOOD YOU BOUGHT JUST DIDN'T LAST AND YOU DIDN'T HAVE MONEY TO GET MORE.: NEVER TRUE

## 2025-03-25 ASSESSMENT — PATIENT HEALTH QUESTIONNAIRE - PHQ9
SUM OF ALL RESPONSES TO PHQ QUESTIONS 1-9: 0
SUM OF ALL RESPONSES TO PHQ QUESTIONS 1-9: 0
2. FEELING DOWN, DEPRESSED OR HOPELESS: NOT AT ALL
SUM OF ALL RESPONSES TO PHQ QUESTIONS 1-9: 0
1. LITTLE INTEREST OR PLEASURE IN DOING THINGS: NOT AT ALL
SUM OF ALL RESPONSES TO PHQ QUESTIONS 1-9: 0

## 2025-03-25 NOTE — PATIENT INSTRUCTIONS
Discuss duration of Brillinta Cardiology   Continue current medications   Labs After June   Schedule Pap

## 2025-03-25 NOTE — PROGRESS NOTES
Jennifer Edge  YOB: 1970    Date of Service:  3/25/2025    Chief Complaint:   Jennifer Edge is a 54 y.o. female who presents for  Chief Complaint   Patient presents with    Hypertension       HPI  Patient is a 54-year-old female with history of TIA and MI, hypothyroidism , hypertension,, CAD, acquired hypothyroidism, prediabetic presents for follow-up on hypertension and chronic conditions    Plans to schedule colonoscopy and pap   Vitamin D insufficiency :Taking OTC [address].       Hypothyroidism  Patient is currently taking Synthroid/levothyroxine 75 mcg M-F. Endorses being cold. Hot flashes keeping patient up. Still having daily.   Patient reports no anxiety, cold intolerance, diarrhea/ constipation, dry skin, hair loss, leg swelling, nail problem, palpitations, tremors, or weight loss.  No recent changes in diet or exercise.      Diabetes  New onset last A1c 6.5(4/20/23), most recent A1c  6.4% (6/5/24). Exercise program. Cutting her sugars, mostly fruits. Less carbohydrates started last week. Walking more.       HTN/HLD/ CAD  Sees Cardiology Dr. Ji, scheduled 11/2024. Takes lisinopril 5 mg daily, metoprolol tartrate 25 mg twice daily, Brilinta 90 mg twice daily, Lipitor 80 mg daily, and aspirin 81 mg daily.  Easily bruising.   Patient denies headache, lightheadedness, blurred vision, chest pain, palpitations, shortness of breath,  peripheral edema, or dry cough,.      Tobacco -former smoker, 1 pack/day for 10 years, quit in 1999  Alcohol - 4 glasses wine a month    Illicit Drugs - denies   Mood - phq 2 of 0  No data recorded    Sexually active - yes ,    Menses - Patient's last menstrual period was 09/09/2019 (approximate).  History of contraception - OCP from 14 years old to 48 years old   Diet - Balanced,   Exercise - Limited due to foot pain   Occupation: not currently   Lives at home with 2 daughter and    Body mass index is 30.9 kg/m².         Patient's medications,

## 2025-04-21 RX ORDER — TICAGRELOR 90 MG/1
90 TABLET ORAL 2 TIMES DAILY
Qty: 60 TABLET | Refills: 6 | Status: SHIPPED | OUTPATIENT
Start: 2025-04-21

## 2025-04-21 NOTE — TELEPHONE ENCOUNTER
Last Office Visit: 11/9/2023 Provider: GRACY  **Is provider OOT? No    Next Office Visit: 11/19/25 Provider: GRACY    **Has patient already had 30 day supply? No    Lab orders needed? no - labs ordered prior  Encounter provider correct? Yes If not, change provider  Script changes since last refill? no    LAST LABS:   CBC:   Lab Results   Component Value Date    WBC 5.0 06/05/2024    HGB 12.3 06/05/2024    HCT 36.0 06/05/2024    MCV 97.3 06/05/2024     06/05/2024     CMP:   Lab Results   Component Value Date     06/05/2024    K 4.5 06/05/2024     06/05/2024    CO2 23 06/05/2024    BUN 10 06/05/2024    CREATININE 0.9 06/05/2024    GLUCOSE 126 (H) 06/05/2024    CALCIUM 10.0 06/05/2024    BILITOT 0.8 06/05/2024    ALKPHOS 122 06/05/2024    AST 28 06/05/2024    ALT 40 06/05/2024    LABGLOM 75 06/05/2024    GFRAA >60 07/11/2022    AGRATIO 2.0 06/05/2024    GLOB 3.0 04/13/2021          Last 3 Lipids:   Lab Results   Component Value Date    CHOL 160 09/09/2024    CHOL 131 07/11/2022    CHOL 133 11/29/2021     Lab Results   Component Value Date    TRIG 150 09/09/2024    TRIG 100 07/11/2022    TRIG 116 11/29/2021     Lab Results   Component Value Date    HDL 63 (H) 09/09/2024    HDL 58 07/10/2023    HDL 55 07/11/2022     Lab Results   Component Value Date    LDL 67 09/09/2024    LDL 55 07/10/2023    LDL 56 07/11/2022     Lab Results   Component Value Date    VLDL 30 09/09/2024    VLDL 20 07/10/2023    VLDL 20 07/11/2022

## 2025-04-29 ENCOUNTER — TELEPHONE (OUTPATIENT)
Dept: FAMILY MEDICINE CLINIC | Age: 55
End: 2025-04-29

## 2025-04-29 NOTE — TELEPHONE ENCOUNTER
Patient called in stating that she and her daughter are not covered on [employer] with their providers. Told patient I will escalate up to manager.

## 2025-04-29 NOTE — TELEPHONE ENCOUNTER
Spoke with Manager requested patient to bring in documentation of letters. Patient stated she will bring them in on 5/2/25

## 2025-06-07 DIAGNOSIS — I25.110 CORONARY ARTERY DISEASE INVOLVING NATIVE CORONARY ARTERY OF NATIVE HEART WITH UNSTABLE ANGINA PECTORIS (HCC): ICD-10-CM

## 2025-06-09 RX ORDER — ATORVASTATIN CALCIUM 80 MG/1
80 TABLET, FILM COATED ORAL NIGHTLY
Qty: 90 TABLET | Refills: 2 | Status: SHIPPED | OUTPATIENT
Start: 2025-06-09

## 2025-07-28 ENCOUNTER — TELEPHONE (OUTPATIENT)
Dept: CARDIOLOGY CLINIC | Age: 55
End: 2025-07-28

## 2025-07-28 DIAGNOSIS — G45.1 TIA INVOLVING RIGHT INTERNAL CAROTID ARTERY: ICD-10-CM

## 2025-07-28 PROBLEM — I63.9 RIND (REVERSIBLE ISCHEMIC NEUROLOGIC DEFICIT), ACUTE (HCC): Status: RESOLVED | Noted: 2021-02-20 | Resolved: 2025-07-28

## 2025-07-28 NOTE — TELEPHONE ENCOUNTER
[facility] called about needing a PA for Brillinta done.  Last script was sent on 4/21/25 to [facility] pharmacy in [place].      Attempted to call pt, LY, needing to know if pt wants the Brillinta sent to [facility]?  Only [facility] pharmacy in her chart at this time.  New script will have to be sent there and then PA can be done.    Called [facility] pharmacy, last Brillinta script was picked up by pt on 5/2/25.

## 2025-07-28 NOTE — TELEPHONE ENCOUNTER
Lov 3/25/2025  Future Appointments   Date Time Provider Department Center   9/30/2025  1:00 PM Sharon Jaime MD [facility] DEP   11/19/2025  1:15 PM Scar Ji MD [facility]

## 2025-07-28 NOTE — TELEPHONE ENCOUNTER
Script was sent to [facility] in April 2025, per [facility] pharmacy Brillinta is not covered by insurance.  Message sent to prior authorization.

## 2025-07-29 ENCOUNTER — TELEPHONE (OUTPATIENT)
Dept: FAMILY MEDICINE CLINIC | Age: 55
End: 2025-07-29

## 2025-07-29 RX ORDER — LISINOPRIL 5 MG/1
5 TABLET ORAL DAILY
Qty: 90 TABLET | Refills: 0 | Status: SHIPPED | OUTPATIENT
Start: 2025-07-29

## 2025-07-29 NOTE — TELEPHONE ENCOUNTER
[phone] this patient stated that the last two office appointments her insurance denied and stated that it is due to not being in network with her insurance shes upset as to why she was not told when checking in that her insurance was not in network      she really likes dr. cao and doesnt want a new doctor. she asked me if we could switch it . i let her know that i dont have that capability and she would need to start with her insurance or maybe change her plan to something that we would exept.

## 2025-07-29 NOTE — TELEPHONE ENCOUNTER
Patient advised and stated that our office is out of network patient is trying to find out how she can changer her insurance plan to continue coming to our office , so at this time she will not be doing lab work

## 2025-07-30 NOTE — TELEPHONE ENCOUNTER
Approval received from [employer] for [employer] from 7/28/2025 until 7/28/2026. Scanned into media.

## (undated) DEVICE — SYRINGE MED 30ML STD CLR PLAS LUERLOCK TIP N CTRL DISP

## (undated) DEVICE — NEEDLE SPNL 20GA L3.5IN YEL HUB S STL REG WALL FIT STYL W/

## (undated) DEVICE — PERFUSION SET 120 MM

## (undated) DEVICE — 1010 S-DRAPE TOWEL DRAPE 10/BX: Brand: STERI-DRAPE™

## (undated) DEVICE — SUTURE STRATAFIX SPRL SZ 2 0 L14IN ABSRB UD MH L36MM 1 2 CIR SXMD2B401

## (undated) DEVICE — COVER LT HNDL PLAS RIG 2 PER PK

## (undated) DEVICE — HOOD: Brand: FLYTE

## (undated) DEVICE — SUTURE ETHBND EXCEL SZ 2 L30IN NONABSORBABLE GRN L40MM V-37 MX69G

## (undated) DEVICE — SOLUTION IRRIG 2000ML 0.9% SOD CHL USP UROMATIC PLAS CONT

## (undated) DEVICE — AUTOTRANSFUSION BOWL SET 125 CC XTRA

## (undated) DEVICE — GOWN SIRUS NONREIN XL W/TWL: Brand: MEDLINE INDUSTRIES, INC.

## (undated) DEVICE — DRAPE C ARM W46XL120IN XLN

## (undated) DEVICE — INTENDED FOR TISSUE SEPARATION, AND OTHER PROCEDURES THAT REQUIRE A SHARP SURGICAL BLADE TO PUNCTURE OR CUT.: Brand: BARD-PARKER ® STAINLESS STEEL BLADES

## (undated) DEVICE — DRAPE SURG UTIL 26X15 IN W/ TAPE N INVASIVE MULT LAYR DISP

## (undated) DEVICE — SYRINGE MED 10ML LUERLOCK TIP W/O SFTY DISP

## (undated) DEVICE — STANDARD HYPODERMIC NEEDLE,POLYPROPYLENE HUB: Brand: MONOJECT

## (undated) DEVICE — SYSTEM SKIN CLSR 22CM DERMBND PRINEO

## (undated) DEVICE — DECANTER: Brand: UNBRANDED

## (undated) DEVICE — HANDPIECE SET WITH HIGH FLOW TIP AND SUCTION TUBE: Brand: INTERPULSE

## (undated) DEVICE — GLOVE ORTHO 7 1/2   MSG9475

## (undated) DEVICE — SOLUTION,SALINE,IRRGATION,500ML,STRL: Brand: MEDLINE

## (undated) DEVICE — BIPOLAR SEALER 23-112-1 AQM 6.0: Brand: AQUAMANTYS ®

## (undated) DEVICE — SUTURE STRATAFIX SPRL SZ 1 L14IN ABSRB VLT L48CM CTX 1/2 SXPD2B405

## (undated) DEVICE — OPTIFOAM GENTLE SA, POSTOP, 4X8: Brand: MEDLINE

## (undated) DEVICE — SUTURE VCRL SZ 2-0 L18IN ABSRB UD CT-1 L36MM 1/2 CIR J839D

## (undated) DEVICE — Device

## (undated) DEVICE — GLOVE SURG SZ 8 L12IN FNGR THK79MIL GRN LTX FREE

## (undated) DEVICE — HOOD, PEEL-AWAY: Brand: FLYTE

## (undated) DEVICE — SUTURE PERMAHAND SZ 2-0 L30IN NONABSORBABLE BLK SILK W/O A305H

## (undated) DEVICE — SYRINGE 20ML LL S/C 50

## (undated) DEVICE — 3M™ STERI-DRAPE™ INCISE DRAPE 1050 (60CM X 45CM): Brand: STERI-DRAPE™

## (undated) DEVICE — PENCIL SMK EVAC ALL IN 1 DSGN ENH VISIBILITY IMPROVED AIR

## (undated) DEVICE — SUTURE STRATAFIX SPRL SZ 3-0 L12IN ABSRB UD FS-1 L30X30CM SXMP2B410